# Patient Record
Sex: MALE | Race: WHITE | Employment: UNEMPLOYED | URBAN - METROPOLITAN AREA
[De-identification: names, ages, dates, MRNs, and addresses within clinical notes are randomized per-mention and may not be internally consistent; named-entity substitution may affect disease eponyms.]

---

## 2021-02-01 ENCOUNTER — APPOINTMENT (OUTPATIENT)
Dept: CT IMAGING | Age: 76
DRG: 500 | End: 2021-02-01
Payer: MEDICARE

## 2021-02-01 ENCOUNTER — APPOINTMENT (OUTPATIENT)
Dept: GENERAL RADIOLOGY | Age: 76
DRG: 500 | End: 2021-02-01
Payer: MEDICARE

## 2021-02-01 ENCOUNTER — HOSPITAL ENCOUNTER (INPATIENT)
Age: 76
LOS: 11 days | Discharge: NURSING, REHAB OR LONG-TERM CARE | DRG: 500 | End: 2021-02-12
Attending: EMERGENCY MEDICINE | Admitting: STUDENT IN AN ORGANIZED HEALTH CARE EDUCATION/TRAINING PROGRAM
Payer: MEDICARE

## 2021-02-01 DIAGNOSIS — M62.82 NON-TRAUMATIC RHABDOMYOLYSIS: Primary | ICD-10-CM

## 2021-02-01 DIAGNOSIS — R29.6 FALL IN ELDERLY PATIENT: ICD-10-CM

## 2021-02-01 DIAGNOSIS — D72.829 LEUKOCYTOSIS, UNSPECIFIED TYPE: ICD-10-CM

## 2021-02-01 LAB
ALBUMIN SERPL-MCNC: 3.3 GM/DL (ref 3.4–5)
ALCOHOL SCREEN SERUM: <0.01 %WT/VOL
ALP BLD-CCNC: 78 IU/L (ref 40–128)
ALT SERPL-CCNC: 40 U/L (ref 10–40)
AMMONIA: 18 UMOL/L (ref 16–60)
AMPHETAMINES: NEGATIVE
ANION GAP SERPL CALCULATED.3IONS-SCNC: 19 MMOL/L (ref 4–16)
AST SERPL-CCNC: 97 IU/L (ref 15–37)
BACTERIA: NEGATIVE /HPF
BARBITURATE SCREEN URINE: NEGATIVE
BASOPHILS ABSOLUTE: 0.1 K/CU MM
BASOPHILS RELATIVE PERCENT: 0.3 % (ref 0–1)
BENZODIAZEPINE SCREEN, URINE: NEGATIVE
BILIRUB SERPL-MCNC: 1.5 MG/DL (ref 0–1)
BILIRUBIN URINE: NEGATIVE MG/DL
BLOOD, URINE: ABNORMAL
BUN BLDV-MCNC: 38 MG/DL (ref 6–23)
CALCIUM SERPL-MCNC: 9.1 MG/DL (ref 8.3–10.6)
CANNABINOID SCREEN URINE: NEGATIVE
CHLORIDE BLD-SCNC: 96 MMOL/L (ref 99–110)
CHP ED QC CHECK: NORMAL
CLARITY: ABNORMAL
CO2: 20 MMOL/L (ref 21–32)
COCAINE METABOLITE: NEGATIVE
COLOR: ABNORMAL
CREAT SERPL-MCNC: 0.9 MG/DL (ref 0.9–1.3)
DIFFERENTIAL TYPE: ABNORMAL
EKG ATRIAL RATE: 122 BPM
EKG DIAGNOSIS: NORMAL
EKG P AXIS: 82 DEGREES
EKG P-R INTERVAL: 172 MS
EKG Q-T INTERVAL: 320 MS
EKG QRS DURATION: 64 MS
EKG QTC CALCULATION (BAZETT): 456 MS
EKG R AXIS: 19 DEGREES
EKG T AXIS: 90 DEGREES
EKG VENTRICULAR RATE: 122 BPM
EOSINOPHILS ABSOLUTE: 0 K/CU MM
EOSINOPHILS RELATIVE PERCENT: 0 % (ref 0–3)
GFR AFRICAN AMERICAN: >60 ML/MIN/1.73M2
GFR NON-AFRICAN AMERICAN: >60 ML/MIN/1.73M2
GLUCOSE BLD-MCNC: 109 MG/DL
GLUCOSE BLD-MCNC: 109 MG/DL (ref 70–99)
GLUCOSE BLD-MCNC: 109 MG/DL (ref 70–99)
GLUCOSE BLD-MCNC: 110 MG/DL (ref 70–99)
GLUCOSE, URINE: NEGATIVE MG/DL
GRANULAR CASTS: 10 /LPF
HCT VFR BLD CALC: 49.2 % (ref 42–52)
HEMOGLOBIN: 17.2 GM/DL (ref 13.5–18)
HYALINE CASTS: 10 /LPF
IMMATURE NEUTROPHIL %: 0.9 % (ref 0–0.43)
INR BLD: 1.33 INDEX
KETONES, URINE: ABNORMAL MG/DL
LACTATE: 1.7 MMOL/L (ref 0.4–2)
LEUKOCYTE ESTERASE, URINE: NEGATIVE
LYMPHOCYTES ABSOLUTE: 0.6 K/CU MM
LYMPHOCYTES RELATIVE PERCENT: 2.6 % (ref 24–44)
MAGNESIUM: 2.2 MG/DL (ref 1.8–2.4)
MCH RBC QN AUTO: 35.2 PG (ref 27–31)
MCHC RBC AUTO-ENTMCNC: 35 % (ref 32–36)
MCV RBC AUTO: 100.6 FL (ref 78–100)
MONOCYTES ABSOLUTE: 1.6 K/CU MM
MONOCYTES RELATIVE PERCENT: 6.8 % (ref 0–4)
MUCUS: ABNORMAL HPF
NITRITE URINE, QUANTITATIVE: NEGATIVE
NUCLEATED RBC %: 0 %
OPIATES, URINE: NEGATIVE
OXYCODONE: NEGATIVE
PDW BLD-RTO: 11.9 % (ref 11.7–14.9)
PH, URINE: 5 (ref 5–8)
PHENCYCLIDINE, URINE: NEGATIVE
PLATELET # BLD: 220 K/CU MM (ref 140–440)
PMV BLD AUTO: 10.4 FL (ref 7.5–11.1)
POTASSIUM SERPL-SCNC: 4.2 MMOL/L (ref 3.5–5.1)
PROTEIN UA: 100 MG/DL
PROTHROMBIN TIME: 16.1 SECONDS (ref 11.7–14.5)
RBC # BLD: 4.89 M/CU MM (ref 4.6–6.2)
RBC URINE: 6 /HPF (ref 0–3)
SARS-COV-2, NAAT: NOT DETECTED
SEGMENTED NEUTROPHILS ABSOLUTE COUNT: 20.4 K/CU MM
SEGMENTED NEUTROPHILS RELATIVE PERCENT: 89.4 % (ref 36–66)
SODIUM BLD-SCNC: 135 MMOL/L (ref 135–145)
SOURCE: NORMAL
SPECIFIC GRAVITY UA: 1.03 (ref 1–1.03)
TOTAL CK: 2315 IU/L (ref 38–174)
TOTAL IMMATURE NEUTOROPHIL: 0.21 K/CU MM
TOTAL NUCLEATED RBC: 0 K/CU MM
TOTAL PROTEIN: 6.1 GM/DL (ref 6.4–8.2)
TRICHOMONAS: ABNORMAL /HPF
TROPONIN T: <0.01 NG/ML
UROBILINOGEN, URINE: 1 MG/DL (ref 0.2–1)
WBC # BLD: 22.9 K/CU MM (ref 4–10.5)
WBC UA: 4 /HPF (ref 0–2)
YEAST: ABNORMAL /HPF

## 2021-02-01 PROCEDURE — 73110 X-RAY EXAM OF WRIST: CPT

## 2021-02-01 PROCEDURE — 74177 CT ABD & PELVIS W/CONTRAST: CPT

## 2021-02-01 PROCEDURE — 1200000000 HC SEMI PRIVATE

## 2021-02-01 PROCEDURE — 93010 ELECTROCARDIOGRAM REPORT: CPT | Performed by: INTERNAL MEDICINE

## 2021-02-01 PROCEDURE — 72170 X-RAY EXAM OF PELVIS: CPT

## 2021-02-01 PROCEDURE — 83735 ASSAY OF MAGNESIUM: CPT

## 2021-02-01 PROCEDURE — 71275 CT ANGIOGRAPHY CHEST: CPT

## 2021-02-01 PROCEDURE — 99285 EMERGENCY DEPT VISIT HI MDM: CPT

## 2021-02-01 PROCEDURE — 85610 PROTHROMBIN TIME: CPT

## 2021-02-01 PROCEDURE — 96365 THER/PROPH/DIAG IV INF INIT: CPT

## 2021-02-01 PROCEDURE — 71045 X-RAY EXAM CHEST 1 VIEW: CPT

## 2021-02-01 PROCEDURE — 73130 X-RAY EXAM OF HAND: CPT

## 2021-02-01 PROCEDURE — 2580000003 HC RX 258: Performed by: EMERGENCY MEDICINE

## 2021-02-01 PROCEDURE — 87077 CULTURE AEROBIC IDENTIFY: CPT

## 2021-02-01 PROCEDURE — 2500000003 HC RX 250 WO HCPCS: Performed by: PHYSICIAN ASSISTANT

## 2021-02-01 PROCEDURE — 84484 ASSAY OF TROPONIN QUANT: CPT

## 2021-02-01 PROCEDURE — 82962 GLUCOSE BLOOD TEST: CPT

## 2021-02-01 PROCEDURE — 82140 ASSAY OF AMMONIA: CPT

## 2021-02-01 PROCEDURE — 93005 ELECTROCARDIOGRAM TRACING: CPT | Performed by: EMERGENCY MEDICINE

## 2021-02-01 PROCEDURE — 96361 HYDRATE IV INFUSION ADD-ON: CPT

## 2021-02-01 PROCEDURE — 94761 N-INVAS EAR/PLS OXIMETRY MLT: CPT

## 2021-02-01 PROCEDURE — 80053 COMPREHEN METABOLIC PANEL: CPT

## 2021-02-01 PROCEDURE — 85025 COMPLETE CBC W/AUTO DIFF WBC: CPT

## 2021-02-01 PROCEDURE — 80307 DRUG TEST PRSMV CHEM ANLYZR: CPT

## 2021-02-01 PROCEDURE — 81001 URINALYSIS AUTO W/SCOPE: CPT

## 2021-02-01 PROCEDURE — U0002 COVID-19 LAB TEST NON-CDC: HCPCS

## 2021-02-01 PROCEDURE — 2580000003 HC RX 258: Performed by: PHYSICIAN ASSISTANT

## 2021-02-01 PROCEDURE — 70450 CT HEAD/BRAIN W/O DYE: CPT

## 2021-02-01 PROCEDURE — 87040 BLOOD CULTURE FOR BACTERIA: CPT

## 2021-02-01 PROCEDURE — 82550 ASSAY OF CK (CPK): CPT

## 2021-02-01 PROCEDURE — 36415 COLL VENOUS BLD VENIPUNCTURE: CPT

## 2021-02-01 PROCEDURE — 83605 ASSAY OF LACTIC ACID: CPT

## 2021-02-01 PROCEDURE — 73200 CT UPPER EXTREMITY W/O DYE: CPT

## 2021-02-01 PROCEDURE — G0480 DRUG TEST DEF 1-7 CLASSES: HCPCS

## 2021-02-01 PROCEDURE — 72125 CT NECK SPINE W/O DYE: CPT

## 2021-02-01 PROCEDURE — 6360000004 HC RX CONTRAST MEDICATION: Performed by: EMERGENCY MEDICINE

## 2021-02-01 PROCEDURE — 2700000000 HC OXYGEN THERAPY PER DAY

## 2021-02-01 PROCEDURE — 6360000002 HC RX W HCPCS: Performed by: PHYSICIAN ASSISTANT

## 2021-02-01 PROCEDURE — 93005 ELECTROCARDIOGRAM TRACING: CPT | Performed by: PHYSICIAN ASSISTANT

## 2021-02-01 RX ORDER — SODIUM CHLORIDE 0.9 % (FLUSH) 0.9 %
10 SYRINGE (ML) INJECTION PRN
Status: DISCONTINUED | OUTPATIENT
Start: 2021-02-01 | End: 2021-02-01

## 2021-02-01 RX ORDER — SODIUM CHLORIDE 0.9 % (FLUSH) 0.9 %
10 SYRINGE (ML) INJECTION PRN
Status: DISCONTINUED | OUTPATIENT
Start: 2021-02-01 | End: 2021-02-12 | Stop reason: HOSPADM

## 2021-02-01 RX ORDER — ACETAMINOPHEN 650 MG/1
650 SUPPOSITORY RECTAL EVERY 6 HOURS PRN
Status: DISCONTINUED | OUTPATIENT
Start: 2021-02-01 | End: 2021-02-12 | Stop reason: HOSPADM

## 2021-02-01 RX ORDER — MULTIVITAMIN WITH IRON
1 TABLET ORAL DAILY
Status: DISCONTINUED | OUTPATIENT
Start: 2021-02-02 | End: 2021-02-05

## 2021-02-01 RX ORDER — THIAMINE HYDROCHLORIDE 100 MG/ML
100 INJECTION, SOLUTION INTRAMUSCULAR; INTRAVENOUS DAILY
Status: DISCONTINUED | OUTPATIENT
Start: 2021-02-02 | End: 2021-02-01 | Stop reason: CLARIF

## 2021-02-01 RX ORDER — SODIUM CHLORIDE 0.9 % (FLUSH) 0.9 %
10 SYRINGE (ML) INJECTION EVERY 12 HOURS SCHEDULED
Status: DISCONTINUED | OUTPATIENT
Start: 2021-02-01 | End: 2021-02-01

## 2021-02-01 RX ORDER — 0.9 % SODIUM CHLORIDE 0.9 %
1000 INTRAVENOUS SOLUTION INTRAVENOUS ONCE
Status: COMPLETED | OUTPATIENT
Start: 2021-02-01 | End: 2021-02-01

## 2021-02-01 RX ORDER — PROMETHAZINE HYDROCHLORIDE 25 MG/1
12.5 TABLET ORAL EVERY 6 HOURS PRN
Status: DISCONTINUED | OUTPATIENT
Start: 2021-02-01 | End: 2021-02-12 | Stop reason: HOSPADM

## 2021-02-01 RX ORDER — POLYETHYLENE GLYCOL 3350 17 G/17G
17 POWDER, FOR SOLUTION ORAL DAILY PRN
Status: DISCONTINUED | OUTPATIENT
Start: 2021-02-01 | End: 2021-02-12 | Stop reason: HOSPADM

## 2021-02-01 RX ORDER — ONDANSETRON 2 MG/ML
4 INJECTION INTRAMUSCULAR; INTRAVENOUS EVERY 6 HOURS PRN
Status: DISCONTINUED | OUTPATIENT
Start: 2021-02-01 | End: 2021-02-12 | Stop reason: HOSPADM

## 2021-02-01 RX ORDER — FOLIC ACID 5 MG/ML
1 INJECTION, SOLUTION INTRAMUSCULAR; INTRAVENOUS; SUBCUTANEOUS DAILY
Status: DISCONTINUED | OUTPATIENT
Start: 2021-02-01 | End: 2021-02-01 | Stop reason: SDUPTHER

## 2021-02-01 RX ORDER — SODIUM CHLORIDE 9 MG/ML
INJECTION, SOLUTION INTRAVENOUS CONTINUOUS
Status: DISCONTINUED | OUTPATIENT
Start: 2021-02-01 | End: 2021-02-02

## 2021-02-01 RX ORDER — SODIUM CHLORIDE 0.9 % (FLUSH) 0.9 %
10 SYRINGE (ML) INJECTION EVERY 12 HOURS SCHEDULED
Status: DISCONTINUED | OUTPATIENT
Start: 2021-02-01 | End: 2021-02-12 | Stop reason: HOSPADM

## 2021-02-01 RX ORDER — THIAMINE HYDROCHLORIDE 100 MG/ML
100 INJECTION, SOLUTION INTRAMUSCULAR; INTRAVENOUS ONCE
Status: DISCONTINUED | OUTPATIENT
Start: 2021-02-01 | End: 2021-02-01 | Stop reason: SDUPTHER

## 2021-02-01 RX ORDER — ACETAMINOPHEN 325 MG/1
650 TABLET ORAL EVERY 6 HOURS PRN
Status: DISCONTINUED | OUTPATIENT
Start: 2021-02-01 | End: 2021-02-12 | Stop reason: HOSPADM

## 2021-02-01 RX ADMIN — IOPAMIDOL 75 ML: 755 INJECTION, SOLUTION INTRAVENOUS at 19:07

## 2021-02-01 RX ADMIN — SODIUM CHLORIDE: 9 INJECTION, SOLUTION INTRAVENOUS at 17:41

## 2021-02-01 RX ADMIN — SODIUM CHLORIDE 1000 ML: 9 INJECTION, SOLUTION INTRAVENOUS at 17:40

## 2021-02-01 RX ADMIN — FOLIC ACID: 5 INJECTION, SOLUTION INTRAMUSCULAR; INTRAVENOUS; SUBCUTANEOUS at 19:11

## 2021-02-01 NOTE — ED PROVIDER NOTES
I independently examined and evaluated Darshan Barksdale. In brief their history revealed called for well check, found him lying on the floor, unclear how long he had been down. Was altered, do not know his baseline. Reported that he was last seen about 2 days ago. Their focused exam revealed awake, alert, well-hydrated, well-nourished, and in no acute distress. Speech is clear. Breathing is unlabored. Skin is dry. Mental status is altered. The patient has moves all extremities, and is without facial droop. Oral mucosa appears dry. This EKG was interpreted by me. Rate is 122, rhythm is sinus. SC and QT intervals are within normal limits. There is no ST segment or T wave changes. Artifact present. Nonspecific ST-T wave abnormalities. No previous EKG currently available for comparison. This EKG was interpreted by me. Rate is 127, rhythm is sinus, with frequent PACs. . SC and QT intervals are within normal limits. There is no ST segment or T wave changes. This EKG was compared to previous EKG from same date. ED course: 80-year-old male who presented after being found down. Patient is a poor historian, does not provide much history. Work-up was initiated. Patient was tachycardic in the 120s, IV fluid resuscitation given as he does appear dry. CK was elevated, suggesting rhabdomyolysis. White blood cell count was elevated, no infectious etiology identified at this time, may be secondary to stress reaction given prolonged down time, chest and abdominal CTs were without acute pathology. UA is still pending at time of admission. It was reported patient has history of alcohol abuse thus CIWA was recommended for persistent tachycardia may be secondary to this. We will plan for admission to the hospital for further evaluation and care. All diagnostic, treatment, and disposition decisions were made by myself in conjunction with the Advanced Practice Provider.     For all further details of the patient's emergency department visit, please see the Advanced Practice Provider's documentation.       Sandro Norton DO  02/05/21 6936

## 2021-02-01 NOTE — ED TRIAGE NOTES
Pt presents to ED by EMS from home for unknown fall and altered mental status. Pt was found at home by welfare check per PD.  Pt arrived with altered mental status, A&O x2. Pt has deformity to left wrist. Alexsandra MEJIA is at bedside

## 2021-02-01 NOTE — ED PROVIDER NOTES
EMERGENCY DEPARTMENT ENCOUNTER      PCP: No primary care provider on file. CHIEF COMPLAINT    Chief Complaint   Patient presents with    Altered Mental Status       Of note, this patient was also evaluated by the attending physician, Dr. German Wilkinson. HPI    Joan Mccall is a 77 y.o. male who presents via EMS for being found lying on floor at home. Exact onset unknown. Apparently patient's friends reported to EMS that he was not answering his phone or door for the past 2 days and thus EMS was notified. Per EMS, patient was found lying on the floor. He does provide history of fall or reason why he is on the floor but does state that he was eating at [employer], biscuits and gravy prior to EMS arrival.    Patient denies any pain. Denies fevers. Denies any symptoms. REVIEW OF SYSTEMS    Patient appears somewhat confused on arrival, initial exam.  Constitutional:  Denies fever, chills, weight loss or weakness   HENT:  Denies sore throat or ear pain   Cardiovascular:  Denies chest pain, palpitations   Respiratory:  Denies cough or shortness of breath    GI:  Denies abdominal pain, nausea, vomiting, or diarrhea  :  Denies any urinary symptoms   Musculoskeletal:  Denies back pain  Skin:  Denies rash  Neurologic: Has pain to superior aspect of posterior scalp, otherwise denies headache, focal weakness or sensory changes   Endocrine:  Denies polyuria or polydypsia   Lymphatic:  Denies swollen glands     All other review of systems are negative  See HPI and nursing notes for additional information     PAST MEDICAL AND SURGICAL HISTORY    No past medical history on file. No past surgical history on file.     CURRENT MEDICATIONS        ALLERGIES    No Known Allergies    SOCIAL AND FAMILY HISTORY    Social History     Socioeconomic History    Marital status: Single     Spouse name: Not on file    Number of children: Not on file    Years of education: Not on file    Highest education level: Not on file   Occupational History    Not on file   Social Needs    Financial resource strain: Not on file    Food insecurity     Worry: Not on file     Inability: Not on file    Transportation needs     Medical: Not on file     Non-medical: Not on file   Tobacco Use    Smoking status: Not on file   Substance and Sexual Activity    Alcohol use: Not on file    Drug use: Not on file    Sexual activity: Not on file   Lifestyle    Physical activity     Days per week: Not on file     Minutes per session: Not on file    Stress: Not on file   Relationships    Social connections     Talks on phone: Not on file     Gets together: Not on file     Attends Jainism service: Not on file     Active member of club or organization: Not on file     Attends meetings of clubs or organizations: Not on file     Relationship status: Not on file    Intimate partner violence     Fear of current or ex partner: Not on file     Emotionally abused: Not on file     Physically abused: Not on file     Forced sexual activity: Not on file   Other Topics Concern    Not on file   Social History Narrative    Not on file     No family history on file. PHYSICAL EXAM    VITAL SIGNS: /66   Pulse 121   Temp 97.6 °F (36.4 °C) (Oral)   Resp 20   SpO2 97%    Constitutional:  Well developed, Well nourished. No distress  HENT:  Normocephalic, Atraumatic, PERRL. EOMI. Sclera clear. Conjunctiva normal, No discharge. Patient is without teeth. No oral cavity or oropharynx trauma. Oral mucosa dry. Neck/Lymphatics: supple, no JVD, no swollen nodes  Cardiovascular:  Rate 120, rhythm regular no murmurs/rubs/gallops. No JVD    Respiratory: Slightly labored breathing with rate of 20. No adventitious lung sounds heard. Abdomen: Bowel sounds normal, Soft, No tenderness, no masses. Musculoskeletal:    Left wrist with swelling diffusely. Skin intact. There is mild to moderate tenderness diffusely.   Difficult to assess for snuffbox tenderness given guarding. Range of motion moderately limited due to pain. Able to move all fingers, hand exam without obvious abnormality. Distal sensation, capillary refill, motor intact. There is no edema, asymmetry, or calf / thigh tenderness bilaterally. No cyanosis. No cool or pale-appearing limb. Distal cap refill and pulses intact bilateral upper and lower extremities  Patient has limited range of motion of bilateral shoulders to 80 degrees forward flexion and abduction. Patient states his shoulders are \"always this way\". Integument:  Warm, Dry    Neurologic:    - Alert & oriented person, place, time, and situation, patient is adentulous and difficult to understand, however no definitive slurred speech   - No obvious gross motor deficits  - Cranial nerves 2-12 grossly intact  - Negative meningeal signs.  - Sensation intact to light touch  -Difficult to assess cerebellar signs as patient has swelling and pain to left wrist region. He also only lifts bilateral shoulders to 80 degrees forward flexion states-baseline per patient.  - No pronator drift. - Light touch sensation intact throughout.   - Upper and lower extremity DTRs 2+ bilaterally.  -No truncal ataxia          Psychiatric:  Affect normal, Mood normal.       Labs:  Results for orders placed or performed during the hospital encounter of 02/01/21   CBC Auto Differential   Result Value Ref Range    WBC 22.9 (H) 4.0 - 10.5 K/CU MM    RBC 4.89 4.6 - 6.2 M/CU MM    Hemoglobin 17.2 13.5 - 18.0 GM/DL    Hematocrit 49.2 42 - 52 %    .6 (H) 78 - 100 FL    MCH 35.2 (H) 27 - 31 PG    MCHC 35.0 32.0 - 36.0 %    RDW 11.9 11.7 - 14.9 %    Platelets 470 151 - 004 K/CU MM    MPV 10.4 7.5 - 11.1 FL    Differential Type AUTOMATED DIFFERENTIAL     Segs Relative 89.4 (H) 36 - 66 %    Lymphocytes % 2.6 (L) 24 - 44 %    Monocytes % 6.8 (H) 0 - 4 %    Eosinophils % 0.0 0 - 3 %    Basophils % 0.3 0 - 1 %    Segs Absolute 20.4 K/CU MM    Lymphocytes Absolute 0.6 K/CU MM    Monocytes Absolute 1.6 K/CU MM    Eosinophils Absolute 0.0 K/CU MM    Basophils Absolute 0.1 K/CU MM    Nucleated RBC % 0.0 %    Total Nucleated RBC 0.0 K/CU MM    Total Immature Neutrophil 0.21 K/CU MM    Immature Neutrophil % 0.9 (H) 0 - 0.43 %   CMP   Result Value Ref Range    Sodium 135 135 - 145 MMOL/L    Potassium 4.2 3.5 - 5.1 MMOL/L    Chloride 96 (L) 99 - 110 mMol/L    CO2 20 (L) 21 - 32 MMOL/L    BUN 38 (H) 6 - 23 MG/DL    CREATININE 0.9 0.9 - 1.3 MG/DL    Glucose 110 (H) 70 - 99 MG/DL    Calcium 9.1 8.3 - 10.6 MG/DL    Albumin 3.3 (L) 3.4 - 5.0 GM/DL    Total Protein 6.1 (L) 6.4 - 8.2 GM/DL    Total Bilirubin 1.5 (H) 0.0 - 1.0 MG/DL    ALT 40 10 - 40 U/L    AST 97 (H) 15 - 37 IU/L    Alkaline Phosphatase 78 40 - 128 IU/L    GFR Non-African American >60 >60 mL/min/1.73m2    GFR African American >60 >60 mL/min/1.73m2    Anion Gap 19 (H) 4 - 16   Urinalysis   Result Value Ref Range    Color, UA CHIQUITA (A) YELLOW    Clarity, UA SLIGHTLY CLOUDY (A) CLEAR    Glucose, Urine NEGATIVE NEGATIVE MG/DL    Bilirubin Urine NEGATIVE NEGATIVE MG/DL    Ketones, Urine MODERATE (A) NEGATIVE MG/DL    Specific Gravity, UA 1.027 1.001 - 1.035    Blood, Urine LARGE (A) NEGATIVE    pH, Urine 5.0 5.0 - 8.0    Protein,  (A) NEGATIVE MG/DL    Urobilinogen, Urine 1 0.2 - 1.0 MG/DL    Nitrite Urine, Quantitative NEGATIVE NEGATIVE    Leukocyte Esterase, Urine NEGATIVE NEGATIVE    RBC, UA 6 (H) 0 - 3 /HPF    WBC, UA 4 (H) 0 - 2 /HPF    Bacteria, UA NEGATIVE NEGATIVE /HPF    Yeast, UA RARE /HPF    Mucus, UA FEW (A) NEGATIVE HPF    Trichomonas, UA NONE SEEN NONE SEEN /HPF    Hyaline Casts, UA 10 /LPF    Granular Casts, UA 10 /LPF   CK   Result Value Ref Range    Total CK 2,315 (H) 38 - 174 IU/L   Magnesium   Result Value Ref Range    Magnesium 2.2 1.8 - 2.4 mg/dl   Troponin   Result Value Ref Range    Troponin T <0.010 <0.01 NG/ML   PT/INR   Result Value Ref Range    Protime 16.1 (H) 11.7 - 14.5 SECONDS INR 1.33 INDEX   Urine Drug Screen   Result Value Ref Range    Cannabinoid Scrn, Ur NEGATIVE NEGATIVE    Amphetamines NEGATIVE NEGATIVE    Cocaine Metabolite NEGATIVE NEGATIVE    Benzodiazepine Screen, Urine NEGATIVE NEGATIVE    Barbiturate Screen, Ur NEGATIVE NEGATIVE    Opiates, Urine NEGATIVE NEGATIVE    Phencyclidine, Urine NEGATIVE NEGATIVE    Oxycodone NEGATIVE NEGATIVE   Ammonia   Result Value Ref Range    Ammonia 18 16 - 60 UMOL/L   Lactic Acid, Plasma   Result Value Ref Range    Lactate 1.7 0.4 - 2.0 mMOL/L   COVID-19    Specimen: Nasopharynx/Oropharynx   Result Value Ref Range    Source THROAT     SARS-CoV-2, NAAT NOT DETECTED    POCT Glucose   Result Value Ref Range    POC Glucose 109 (H) 70 - 99 MG/DL   EKG 12 Lead   Result Value Ref Range    Ventricular Rate 122 BPM    Atrial Rate 122 BPM    P-R Interval 172 ms    QRS Duration 64 ms    Q-T Interval 320 ms    QTc Calculation (Bazett) 456 ms    P Axis 82 degrees    R Axis 19 degrees    T Axis 90 degrees    Diagnosis       Sinus tachycardia with premature supraventricular complexes  Septal infarct , age undetermined  Abnormal ECG  No previous ECGs available  Confirmed by ELSY Webber ([id number]) on 2/1/2021 6:57:51 PM             EKG Interpretation  Please see ED physician's note for EKG interpretation       RADIOLOGY    CT ABDOMEN PELVIS W IV CONTRAST   Final Result   1. No acute intra-abdominal process identified. 2. Moderate amount of stool at the rectal vault. 3. Severe atherosclerotic disease. 4. Cholelithiasis. 5. 1.1 cm indeterminate right adrenal nodule. Given size and Hounsfield   units, findings reflect probable adenoma. Current guidelines recommend 1   year follow-up adrenal washout CT. If stable greater than or equal to 1   year, no further follow-up imaging. CTA PULMONARY W CONTRAST   Final Result   No evidence of pulmonary embolism or acute pulmonary abnormality.       Cholelithiasis without CT evidence of acute cholecystitis. CT WRIST LEFT WO CONTRAST   Final Result   1. Malalignment of the wrist which has the appearance of scapholunate   advanced collapse and is favored to be chronic given the overall appearance. No remote comparison exams are available. Associated fragmentation of the   lunate which is also favored to be chronic. No definite acute fracture   identified. 2. 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 3. Osteopenia. 4. Diffuse soft tissue edema. XR HAND LEFT (MIN 3 VIEWS)   Final Result   Triquetral fracture, of uncertain age. Multiple abnormalities in the carpus. CT scan is recommended for further   evaluation. Diffuse soft tissue swelling around the left wrist.         XR WRIST LEFT (MIN 3 VIEWS)   Final Result   Triquetral fracture, of uncertain age. Multiple abnormalities in the carpus. CT scan is recommended for further   evaluation. Diffuse soft tissue swelling around the left wrist.         XR PELVIS (1-2 VIEWS)   Final Result   No definite acute fracture or dislocation in the pelvis. XR CHEST PORTABLE   Final Result   No acute abnormality identified. CT CERVICAL SPINE WO CONTRAST   Final Result   No acute abnormality of the cervical spine. CT HEAD WO CONTRAST   Final Result   1. High left posterior scalp soft tissue swelling but no acute intracranial   abnormality. 2. Diffuse cerebral atrophy with chronic small vessel ischemic disease. ED COURSE & MEDICAL DECISION MAKING       Patient presents as above via EMS. CRITICAL CARE NOTE:  There was a high probability of clinically significant life-threatening deterioration of the patient's condition requiring my urgent intervention due to tachycardia, confusion, fall and lying on floor of unknown duration. Immediate bedside evaluation was done.   Patient clinically dehydrated, slightly confused without obvious focal neuro deficit, however exam somewhat limited due to patient's left upper extremity injury. Immediately sent patient to CT scanner, initiated laboratory evaluation as well as IV fluids. Emergent EKG obtained revealing sinus tachycardia. Patient closely monitored for response to IV fluids. Tachycardia only marginally response. Per EMS report, patient is known in community for EtOH is him. Given this, [facility] protocol orders were initiated. Fluids continued. Close monitoring continued. Total critical care time is at least  35 minutes. This includes vital sign monitoring, pulse oximetry monitoring, telemetry monitoring, clinical response to the IV medications, reviewing the nursing notes, consultation time, dictation/documentation time, and interpretation of the lab work. This time excludes family discussion time and time spent performing separately billable procedure(s) (see below for more details)      If applicable, Any/all of the below separately billable procedures that were performed during this critical care intervention are documented separately - please see the appropriate note for details:        -ECG interpretation       2015-I discussed patient case with hospitalist, Dr. Yolis Souza. He agrees to admit patient. Clinical  IMPRESSION    1. Non-traumatic rhabdomyolysis    2. Leukocytosis, unspecified type    3. Fall in elderly patient          Pt admitted. Comment: Please note this report has been produced using speech recognition software and may contain errors related to that system including errors in grammar, punctuation, and spelling, as well as words and phrases that may be inappropriate. If there are any questions or concerns please feel free to contact the dictating provider for clarification.          Mari Britton [place]  02/01/21 2016

## 2021-02-01 NOTE — CARE COORDINATION
CM consult per Dr Frances Welch to initiate discharge planning. Review of pt chart, one other visit to [address] [place] [address]. No emergency contacts and no insurance. This CM visited pt, who is awake, Harjinder Zavala RN also in room with pt. I introduced self and reason for visit. CM ask pt what was his reason for coming to the hospital. Pt didn't know states he was asleep, and was awakened. Pt states that yesterday he was at the [facility] having biscuits and gravy and a few beers. This CM ask pt questions to find out information on this pt. Pt stated he drives, a red [place]. Pt lives alone in a house, has a dtr/Kierra that  Lives in [place] [address].. Pt has a cell phone, ask if he has his dtrs # in his phone he states yes, gave this CM permission to look, found a [employer] [phone] called the # left a message. Pt tells me he has [employer] states the card is in his pants or coat pocket in a rubber band. Pt gave me permission to look through his pants. No insurance card pt had keys three diff key rings, [employer] card and one other entry type of card, $50 dollar bill and some change. Pt states he uses a walker to get to his car, go's through the drive through. Pt wants to return home at discharge from hospital, pt has no needs. Registration was able to find pt insurance and add it to pt chart. Spoke with Linda Zavaleta who states he will have to admit this pt today due to Rhabdomyolysis.  STANISLAWRN/CM

## 2021-02-02 ENCOUNTER — APPOINTMENT (OUTPATIENT)
Dept: GENERAL RADIOLOGY | Age: 76
DRG: 500 | End: 2021-02-02
Payer: MEDICARE

## 2021-02-02 LAB
ALBUMIN SERPL-MCNC: 2.7 GM/DL (ref 3.4–5)
ALP BLD-CCNC: 70 IU/L (ref 40–128)
ALT SERPL-CCNC: 32 U/L (ref 10–40)
ANION GAP SERPL CALCULATED.3IONS-SCNC: 13 MMOL/L (ref 4–16)
AST SERPL-CCNC: 49 IU/L (ref 15–37)
BILIRUB SERPL-MCNC: 1 MG/DL (ref 0–1)
BUN BLDV-MCNC: 29 MG/DL (ref 6–23)
CALCIUM SERPL-MCNC: 8.2 MG/DL (ref 8.3–10.6)
CHLORIDE BLD-SCNC: 104 MMOL/L (ref 99–110)
CO2: 21 MMOL/L (ref 21–32)
CREAT SERPL-MCNC: 0.7 MG/DL (ref 0.9–1.3)
EKG ATRIAL RATE: 127 BPM
EKG DIAGNOSIS: NORMAL
EKG P AXIS: 96 DEGREES
EKG P-R INTERVAL: 198 MS
EKG Q-T INTERVAL: 340 MS
EKG QRS DURATION: 88 MS
EKG QTC CALCULATION (BAZETT): 494 MS
EKG R AXIS: -31 DEGREES
EKG T AXIS: 63 DEGREES
EKG VENTRICULAR RATE: 127 BPM
FOLATE: 5 NG/ML (ref 3.1–17.5)
GFR AFRICAN AMERICAN: >60 ML/MIN/1.73M2
GFR NON-AFRICAN AMERICAN: >60 ML/MIN/1.73M2
GLUCOSE BLD-MCNC: 94 MG/DL (ref 70–99)
HCT VFR BLD CALC: 44.7 % (ref 42–52)
HEMOGLOBIN: 14.9 GM/DL (ref 13.5–18)
MCH RBC QN AUTO: 35.1 PG (ref 27–31)
MCHC RBC AUTO-ENTMCNC: 33.3 % (ref 32–36)
MCV RBC AUTO: 105.4 FL (ref 78–100)
PDW BLD-RTO: 12.4 % (ref 11.7–14.9)
PHOSPHORUS: 2.5 MG/DL (ref 2.5–4.9)
PLATELET # BLD: 194 K/CU MM (ref 140–440)
PMV BLD AUTO: 10.9 FL (ref 7.5–11.1)
POTASSIUM SERPL-SCNC: 3.8 MMOL/L (ref 3.5–5.1)
RBC # BLD: 4.24 M/CU MM (ref 4.6–6.2)
SODIUM BLD-SCNC: 138 MMOL/L (ref 135–145)
TOTAL CK: 816 IU/L (ref 38–174)
TOTAL PROTEIN: 5.1 GM/DL (ref 6.4–8.2)
TSH HIGH SENSITIVITY: 0.75 UIU/ML (ref 0.27–4.2)
VITAMIN B-12: 513.3 PG/ML (ref 211–911)
WBC # BLD: 20.3 K/CU MM (ref 4–10.5)

## 2021-02-02 PROCEDURE — 2580000003 HC RX 258: Performed by: INTERNAL MEDICINE

## 2021-02-02 PROCEDURE — 97530 THERAPEUTIC ACTIVITIES: CPT

## 2021-02-02 PROCEDURE — 6360000002 HC RX W HCPCS: Performed by: STUDENT IN AN ORGANIZED HEALTH CARE EDUCATION/TRAINING PROGRAM

## 2021-02-02 PROCEDURE — 85027 COMPLETE CBC AUTOMATED: CPT

## 2021-02-02 PROCEDURE — 84425 ASSAY OF VITAMIN B-1: CPT

## 2021-02-02 PROCEDURE — 2580000003 HC RX 258: Performed by: STUDENT IN AN ORGANIZED HEALTH CARE EDUCATION/TRAINING PROGRAM

## 2021-02-02 PROCEDURE — 97162 PT EVAL MOD COMPLEX 30 MIN: CPT

## 2021-02-02 PROCEDURE — 82746 ASSAY OF FOLIC ACID SERUM: CPT

## 2021-02-02 PROCEDURE — 99223 1ST HOSP IP/OBS HIGH 75: CPT | Performed by: NURSE PRACTITIONER

## 2021-02-02 PROCEDURE — 80053 COMPREHEN METABOLIC PANEL: CPT

## 2021-02-02 PROCEDURE — 73630 X-RAY EXAM OF FOOT: CPT

## 2021-02-02 PROCEDURE — 94761 N-INVAS EAR/PLS OXIMETRY MLT: CPT

## 2021-02-02 PROCEDURE — 2700000000 HC OXYGEN THERAPY PER DAY

## 2021-02-02 PROCEDURE — 84443 ASSAY THYROID STIM HORMONE: CPT

## 2021-02-02 PROCEDURE — 93010 ELECTROCARDIOGRAM REPORT: CPT | Performed by: INTERNAL MEDICINE

## 2021-02-02 PROCEDURE — 92610 EVALUATE SWALLOWING FUNCTION: CPT | Performed by: SPEECH-LANGUAGE PATHOLOGIST

## 2021-02-02 PROCEDURE — 97166 OT EVAL MOD COMPLEX 45 MIN: CPT

## 2021-02-02 PROCEDURE — 82550 ASSAY OF CK (CPK): CPT

## 2021-02-02 PROCEDURE — 99211 OFF/OP EST MAY X REQ PHY/QHP: CPT

## 2021-02-02 PROCEDURE — 1200000000 HC SEMI PRIVATE

## 2021-02-02 PROCEDURE — 82607 VITAMIN B-12: CPT

## 2021-02-02 PROCEDURE — 36415 COLL VENOUS BLD VENIPUNCTURE: CPT

## 2021-02-02 PROCEDURE — 84100 ASSAY OF PHOSPHORUS: CPT

## 2021-02-02 RX ORDER — SODIUM CHLORIDE 9 MG/ML
INJECTION, SOLUTION INTRAVENOUS CONTINUOUS
Status: DISCONTINUED | OUTPATIENT
Start: 2021-02-02 | End: 2021-02-03

## 2021-02-02 RX ADMIN — SODIUM CHLORIDE: 9 INJECTION, SOLUTION INTRAVENOUS at 01:57

## 2021-02-02 RX ADMIN — SODIUM CHLORIDE: 9 INJECTION, SOLUTION INTRAVENOUS at 21:33

## 2021-02-02 RX ADMIN — ENOXAPARIN SODIUM 40 MG: 40 INJECTION SUBCUTANEOUS at 08:09

## 2021-02-02 RX ADMIN — THIAMINE HYDROCHLORIDE 100 MG: 100 INJECTION, SOLUTION INTRAMUSCULAR; INTRAVENOUS at 08:58

## 2021-02-02 RX ADMIN — SODIUM CHLORIDE, PRESERVATIVE FREE 10 ML: 5 INJECTION INTRAVENOUS at 08:09

## 2021-02-02 RX ADMIN — SODIUM CHLORIDE, PRESERVATIVE FREE 10 ML: 5 INJECTION INTRAVENOUS at 21:36

## 2021-02-02 RX ADMIN — SODIUM CHLORIDE: 9 INJECTION, SOLUTION INTRAVENOUS at 08:57

## 2021-02-02 ASSESSMENT — ENCOUNTER SYMPTOMS
NAUSEA: 0
SHORTNESS OF BREATH: 0
DIARRHEA: 0
ANAL BLEEDING: 0
VOMITING: 0
BLOOD IN STOOL: 0
RHINORRHEA: 0
ABDOMINAL PAIN: 0
CHEST TIGHTNESS: 0
COUGH: 0
COLOR CHANGE: 0
WHEEZING: 0
SORE THROAT: 0

## 2021-02-02 NOTE — ED NOTES
@1002 paged hospitalists
ANGEL Taylor.  [facility]  02/01/21 1739
Bed: 01TR-01  Expected date:   Expected time:   Means of arrival:   Comments:  EMS- fall     Fela Walsh Aas RN  02/01/21 2789
Pt states it is 2002 and is unaware of day of week. Pt is alert to self and situation, denies pain at this time.  Blood glucose is 115 Moon Wright RN  02/01/21 5454
Report given to Deanne Perez RN.       Pamela Cabrera RN  02/01/21 2122
Report received from Mervin Braswell.       Yuridia Tellez RN  02/01/21 6597
[place], lactic and type screen drawn.      Mauricio Barkley  02/01/21 2293
Plan: Recheck at 6 months
Detail Level: Zone

## 2021-02-02 NOTE — PROGRESS NOTES
Speech Language Pathology  Facility/Department: [address] [place] [address]   CLINICAL BEDSIDE SWALLOW EVALUATION    NAME: Giulia Villegas  : 1945  MRN: [id number]    Impression  Dysphagia Diagnosis: Swallow function appears grossly intact  Dysphagia Outcome Severity Scale: Level 6: Within functional limits/Modified independence     Giulia Villegas was seen for a clinical bedside swallow evaluation following admission for fall at home, rhabdo, dehydration, and confusion. CT head and CXR are negative. H/o ETOH use d/o. Pt was alert, confused and mildly upset regarding his admission and needing to find his cell phone. Pt's dtr called during the assessment and spoke with pt. She reported to ST that pt sounded very confused compared to his baseline. He denied h/o dysphagia and reports difficulty chewing some solids due to no dentition. Pt followed all commands for oral motor exam which was WNL with no focal weakness or asymmetry. Vocal quality and cough strength were WNL. PO trials of thins by cup and straw and pureed/regular solids completed. Oropharyngeal swallow appears WFL with normal bolus formation, a-p transit and mild lingual residue for regular solids and 0 s/s aspiration across all trials including continuous sips of thins by straw. Pt c/o gum pain with mastication of regular solids. Recommend:  Dental Soft/Thins. Assist/Total feed due to LUE injury. No needs identified at this time.       ADMISSION DATE: 2021  ADMITTING DIAGNOSIS: has Rhabdomyolysis on their problem list.  ONSET DATE: 2021     Recent Chest Xray/CT of Chest: negative    Date of Eval: 2021  Evaluating Therapist: Bhavani Araujo    Current Diet level:  Current Diet : NPO  Current Liquid Diet : NPO    Primary Complaint  Patient Complaint: wants cell phone, wants something to drink    Pain:  Pain Assessment  Pain Assessment: 0-10  Pain Level: 4  Patient's Stated Pain Goal: No pain  Pain Type: Acute pain    Reason for Referral  Elton Ahmadi was referred for a bedside swallow evaluation to assess the efficiency of his swallow function, identify signs and symptoms of aspiration and make recommendations regarding safe dietary consistencies, effective compensatory strategies, and safe eating environment. Treatment Plan  Requires SLP Intervention: No  Duration/Frequency of Treatment: n/a  D/C Recommendations: To be determined       Recommended Diet and Intervention  Diet Solids Recommendation: Dental Soft  Liquid Consistency Recommendation: Thin  Recommended Form of Meds: PO  Recommendations: Assist feed; Total feed       Compensatory Swallowing Strategies  Compensatory Swallowing Strategies: Upright as possible for all oral intake    Treatment/Goals  Short-term Goals  Timeframe for Short-term Goals: n/a  Long-term Goals  Timeframe for Long-term Goals: n/a    General  Chart Reviewed: Yes  Behavior/Cognition: Alert; Cooperative  Respiratory Status: Room air  Communication Observation: Functional  Follows Directions: Complex  Dentition: Edentulous  Patient Positioning: Upright in chair  Baseline Vocal Quality: Normal  Volitional Cough: Strong  Volitional Swallow: (intact)  Prior Dysphagia History: denies  Consistencies Administered: Reg solid; Dysphagia Pureed (Dysphagia I); Thin - straw; Thin - cup           Vision/Hearing  Hearing  Hearing: Within functional limits    Oral Motor Deficits  Oral/Motor  Oral Motor:  Within functional limits    Oral Phase Dysfunction  Oral Phase  Oral Phase: Exceptions  Oral Phase Dysfunction  Impaired Mastication: Reg Solid     Indicators of Pharyngeal Phase Dysfunction   Pharyngeal Phase  Pharyngeal Phase: WNL    Prognosis  Prognosis  Prognosis for safe diet advancement: good  Barriers/Prognosis Comment: confusion, no dentition  Individuals consulted  Consulted and agree with results and recommendations: Patient;RN    Education  Patient Education: results [facility]  Patient Education Response: Demonstrated understanding  Safety Devices in place: Yes  Type of devices: Left in chair; All fall risk precautions in place       Therapy Time  SLP Individual Minutes  Time In: 1130  Time Out: 1200  Minutes: Sepideh Gaytan [place]  2/2/2021 12:04 PM

## 2021-02-02 NOTE — CARE COORDINATION
CM in to see Pt to discuss discharge planning. Pt is from home alone. Pt states he has DME to include a cane, walker, and rollator. Pt has insurance, no pcp listed, and states he can afford his medications. PT/OT recommend SNF at discharge. Pt agreeable, first choice [facility] [facility]. CM made referral to Lorene Spurling. Pt denies any other needs at this time.   CM following for discharge needs

## 2021-02-02 NOTE — PROGRESS NOTES
Hospitalist Progress Note      Name:  Carol Jasso /Age/Sex: 1945  (77 y.o. male)   MRN & CSN:  [id number] & [id number] Admission Date/Time: 2021  2:05 PM   Location:  [address] [place] [address] PCP: No primary care provider on file. Hospital Day: 2    Assessment and Plan:   Carol Jasso is a 77 y.o.  male  who presents with Rhabdomyolysis    --Nontraumatic Rhadomyolysis   Due to Prolonged immobilization after a fall. No CISCO. CK decreasing with fluid resuscitation (2315->816). Plan: continue  NS    --Mechanical fall at home - PT/OT    --Left Triquetral fracture, likely from fall on outstretched arm. Plan: get orthopedic surgery consult. --Left foot ulcer (present on admission)  Wound care on board  Left foot Xray    --Alcohol use disorder  Serum alcohol not elevated. No withdrawal symptoms at this time. Plan: folate and thiamine. --Incidental Rt Adrenal nodule - repeat imaging in 1 year. PCP to f/u. --Abnormal LFTs - from rhabdomyloysis. Improving. --Leukocytosis (22K) - likely reactive. No fever or chills. Continue to monitor off antibiotics. --Constipation - CT abd/pel shows stools in rectal vault. Plan: Miralaxa PRN    Diet DIET GENERAL;   DVT Prophylaxis [] Lovenox, []  Heparin, [] SCDs, [] Ambulation   GI Prophylaxis [] PPI,  [] H2 Blocker,  [] Carafate,  [] Diet/Tube Feeds   Code Status Full Code   Disposition Pending PT/OT. MDM [] Low, [x] Moderate,[]  High     History of Present Illness:   Patient seen and examined. No acute issues overnight. Left wrist pain well controlled. CK decreasing. Ten point ROS reviewed negative, unless as noted above    Objective:   No intake or output data in the 24 hours ending 21 1129   Vitals:   Vitals:    21 0800   BP: 112/76   Pulse: 103   Resp: 14   Temp: 98.3 °F (36.8 °C)   SpO2: 95%     Physical Exam:   GEN Awake male, sitting upright in bed. RESP Breathing comfortably on RA. CARDIO/VASC S1/S2 auscultated. Regular rate without appreciable murmurs. No peripheral edema. GI Abdomen is soft without significant tenderness, masses, or guarding. Bowel sounds are normoactive. MSK Left wrist splinted. SKIN Normal coloration, warm, dry. NEURO  normal speech, no lateralizing weakness. PSYCH Awake, alert, oriented x 3.      Medications:   Medications:    sodium chloride flush  10 mL Intravenous 2 times per day    enoxaparin  40 mg Subcutaneous Daily    multivitamin  1 tablet Oral Daily    thiamine (VITAMIN B1) IVPB  100 mg Intravenous Daily      Infusions:    sodium chloride 100 mL/hr at 02/02/21 0857     PRN Meds:     sodium chloride flush, 10 mL, PRN      promethazine, 12.5 mg, Q6H PRN    Or      ondansetron, 4 mg, Q6H PRN      polyethylene glycol, 17 g, Daily PRN      acetaminophen, 650 mg, Q6H PRN    Or      acetaminophen, 650 mg, Q6H PRN        CBC   Recent Labs     02/01/21  1240 02/02/21  0639   WBC 22.9* 20.3*   HGB 17.2 14.9   HCT 49.2 44.7    194      BMP   Recent Labs     02/01/21  1240 02/02/21  0639    138   K 4.2 3.8   CL 96* 104   CO2 20* 21   PHOS  --  2.5   BUN 38* 29*   CREATININE 0.9 0.7*       Radiology report reviewed     Electronically signed by Artemio Shone, MD on 2/2/2021 at 11:29 AM

## 2021-02-02 NOTE — CONSULTS
Neurology Service Consult Note  [facility]  Patient Name: Rustam Gonzales  : 1945        Subjective:   Reason for consult: \"I just fell at the [facility]"  77 y.o. right-handed male With past medical history of tobacco abuse and alcohol abuse presenting to [facility] after patient: Ruthy Harvey describes a change of events that are peculiar that occurred 1 day prior to exam.  Patient ports that yesterday morning he went to [employer] to go get biscuits and gravy he ate a biscuit and gravy, then he went to the [employer] where he wanted to get more breakfast, he states he frequently angles, he states that he wanted eggs and toast, he also likes to drink a beer before and after breakfast so he was sitting there, he states he then dropped a cigarette on the floor and also dropped to 50 dollar bill and a couple 20s he went to go get them he felt, he states that he wanted to then leave, and he tried to exit the kitchen but then the police were called. He states the police check on him, they have all normal as they let him drive his vehicle and when he got home he felt like 10 and this is when either the police decided to call EMS or neighbor noticed what was going on for [place]. The ER note says that he was down for period of time and this is evidenced by an increased creatinine kinase, however this part of the story is not given by the patient. He states that he that he does not feel very much and he wants to be left alone ideally. We had extensive conversation at the bedside that drinking even 3-4 beers can lower B12 levels which then in turn can cause us to have peripheral neuropathy and balance disturbance and patient does admit that he has poor balance daily. He denies any unilateral arm or leg weakness he had no chest pain or shortness of breath no aphasia facial droop or dysarthria. He had no fever neck or back pain.       Past Medical History:   Diagnosis Date    Alcohol abuse     Fall in elderly patient     Tobacco abuse     : No past surgical history on file. Medications:  Scheduled Meds:   sodium chloride flush  10 mL Intravenous 2 times per day    enoxaparin  40 mg Subcutaneous Daily    multivitamin  1 tablet Oral Daily    thiamine (VITAMIN B1) IVPB  100 mg Intravenous Daily     Continuous Infusions:   sodium chloride 100 mL/hr at 02/02/21 0857     PRN Meds:.sodium chloride flush, promethazine **OR** ondansetron, polyethylene glycol, acetaminophen **OR** acetaminophen    No Known Allergies  Social History     Socioeconomic History    Marital status: Single     Spouse name: Not on file    Number of children: Not on file    Years of education: Not on file    Highest education level: Not on file   Occupational History    Not on file   Social Needs    Financial resource strain: Not on file    Food insecurity     Worry: Not on file     Inability: Not on file    Transportation needs     Medical: Not on file     Non-medical: Not on file   Tobacco Use    Smoking status: Not on file   Substance and Sexual Activity    Alcohol use: Not on file    Drug use: Not on file    Sexual activity: Not on file   Lifestyle    Physical activity     Days per week: Not on file     Minutes per session: Not on file    Stress: Not on file   Relationships    Social connections     Talks on phone: Not on file     Gets together: Not on file     Attends Religion service: Not on file     Active member of club or organization: Not on file     Attends meetings of clubs or organizations: Not on file     Relationship status: Not on file    Intimate partner violence     Fear of current or ex partner: Not on file     Emotionally abused: Not on file     Physically abused: Not on file     Forced sexual activity: Not on file   Other Topics Concern    Not on file   Social History Narrative    Not on file      History reviewed. No pertinent family history. Review of Symptoms:    14-point system review completed.  All of which are negative except as mentioned above. Physical Exam:         Gen: Patient alert and awake to himself, he knew the previous president not current president and he is not oriented to the year but oriented to location NAD, cooperative  HEENT: NC/AT, EOMI, PERRL, mmm, neck supple, no meningeal signs; Heart: regular   Lungs: no distress  Ext: He has 1+ edema bilateral lower extremities, the left upper extremity is splinted and wrapped in a cast  Psych: normal mood and affect  Skin: He has chronic discoloration in stocking distribution in the bilateral lower extremities    NEUROLOGIC EXAM:    Mental Status:  Patient alert to himself, he knew the previous president not current president and he is not oriented to the year but oriented to location NAD, speech clear, language fluent, repetition and naming intact, follows commands appropriately    Cranial Nerve Exam:   CN II-XII: , PERRL, VFF, no nystagmus, no gaze paresis, sensation V1-V3 intact b/l, muscles of facial expression symmetric; hearing intact to conversational tone, palate elevates symmetrically, shoulder elevation symmetric and tongue protrudes midline with movement side to side.     Motor Exam:       Antigravity x4 with no drift spasticity or rigidity noted    Deep Tendon Reflexes: 1/4 biceps, triceps, brachioradialis, patellar, and achilles b/l; flexor plantar responses b/l    Sensation: Intact light touch/pinprick/vibration UE's/LE's b/l, all decreased in bilateral lower extremity stocking distribution    Coordination/Cerebellum:       Tremors--none      Rapidly alternating movements: no dysdiadochokinesia b/l             Finger-to-Nose: no dysmetria b/l    Gait and stance:      Gait: deferred for safety as patient did not want to get up at the time he wanted to go to bed      LABS:     Recent Labs     02/01/21  1240 02/01/21  2121 02/02/21  0639   WBC 22.9*  --  20.3*     --  138   K 4.2  --  3.8   CL 96*  --  104   CO2 20*  --  21   BUN 38*  --  29* CREATININE 0.9  --  0.7*   GLUCOSE 110* 109 94   INR 1.33  --   --      B12 WNL    IMAGING:    CT head nonacute      ASSESSMENT/PLAN:     3 77year old male with acute mechanical fall now with TME superimposed on ETOH intoxicaiton and PN with now acute CISCO with rhabdomyolysis. Suspect concussion as well. Plan of care as follows:  1. Neuro exam:  1. Stocking distribution sensation loss BLE  2. Neurodiagnostics:  1. CT head non acute  3. Medications:  1. Recommend B12 and B1  4. PT/OT/ST:  1. Balance therapy   5. Follow up:  1. Memory work up outpatient    2. No further studies         Thank you for allowing us to participate in the care of your patient. If there are any questions regarding evaluation please feel free to contact us.      Lonny Blas, APRN - CNP, 2/2/2021

## 2021-02-02 NOTE — CONSULTS
Lab Results   Component Value Date    LABALBU 2.7 02/02/2021     PT/INR:    Lab Results   Component Value Date    PROTIME 16.1 02/01/2021    INR 1.33 02/01/2021     HgBA1c:  No results found for: LABA1C      Assessment:     Patient Active Problem List   Diagnosis    Rhabdomyolysis       Measurements:  Wound 02/02/21 Foot Left;Dorsal (Active)   Wound Etiology Other 02/02/21 1255   Dressing Status New dressing applied 02/02/21 1255   Wound Cleansed Cleansed with saline 02/02/21 1255   Wound Length (cm) 1 cm 02/02/21 1255   Wound Width (cm) 0.7 cm 02/02/21 1255   Wound Depth (cm) 0.1 cm 02/02/21 1255   Wound Surface Area (cm^2) 0.7 cm^2 02/02/21 1255   Wound Volume (cm^3) 0.07 cm^3 02/02/21 1255   Distance Tunneling (cm) 0 cm 02/02/21 1255   Tunneling Position ___ O'Clock 0 02/02/21 1255   Undermining Starts ___ O'Clock 0 02/02/21 1255   Undermining Ends___ O'Clock 0 02/02/21 1255   Undermining Maxium Distance (cm) 0 02/02/21 1255   Wound Assessment Eschar dry;Pink/red;Purple/maroon 02/02/21 1255   Drainage Amount None 02/02/21 1255   Odor None 02/02/21 1255   Leyla-wound Assessment Edematous; Warm;Blanchable erythema 02/02/21 1255   Margins Attached edges 02/02/21 1255   Number of days: 0       Response to treatment:  With complaints of pain. Pain Assessment:  Severity:  moderate  Quality of pain: sharp  Wound Pain Timing/Severity: with touching of left foot  Premedicated: no    Plan:     Plan of Care: Wound 02/02/21 Foot Left;Dorsal-Dressing/Treatment: (optifoam AG, kerlix)     Seen at 1255. Pt in chair. Agreeable to wound assessment. Left dorsal foot with dry yellow/purple area of eschar surrounded by erythema and edema. Painful to slight touch and warm. Pt stated has had for approximately 2-3 weeks and may have had longer. Poor historian. Picture and measurements taken. Treatment applied as above. Suspicious for possible infection-recommend surgeon evaluate.  Updated Dr. Saad Peck on assessment and recommendations. Xray left foot pending. Heels intact. Pt does not want to return to bed at this time for posterior assessment. Will reattempt. Atmos air pump applied to mattress. Pt is at mild risk for skin breakdown AEB Pee. Follow Pee orders. Return at 1515 to do posterior assessment. Pt in bed. Stage 2 seen to left sacrum. Tender to touch. Surrounding skin with blanchable erythema/purple discoloration. Picture and measurements taken. Treatment applied as above. Positioned to right side with heels floated with pillow support. Specialty Bed Required : yes  [] Low Air Loss   [x] Pressure Redistribution  [] Fluid Immersion  [] Bariatric  [] Total Pressure Relief  [] Other:     Discharge Plan:  Placement for patient upon discharge: tbd  Hospice Care: no  Patient appropriate for Outpatient [address]: no    Patient/Caregiver Teaching:  Level of patient/caregiver understanding able to:   Needs reinforcement.         Electronically signed by Iman Torres RN,  on 2/2/2021 at 1:37 PM

## 2021-02-02 NOTE — H&P
chronic medical conditions:  Continue all home meds except stated above or contraindicated.  Tobacco abuse    Diet DIET RENAL;   DVT Prophylaxis [x] Lovenox, []  Heparin, [] SCDs, [] Ambulation   GI Prophylaxis [] PPI,  [] H2 Blocker,  [] Carafate,  [] Diet/Tube Feeds   Code Status No Order   Disposition Patient requires continued admission due to Rhabdomyolysis   MDM [] Low, [] Moderate,[x]  High  Patient's risk as above due to acuity of condition with potential for decompensation. History of Present Illness:     Chief Complaint: Rhabdomyolysis    Andressa Yin is a 77 y.o.  male with family history unable to be obtained in the past medical history as below, who presents via EMS after being found in his home on the ground. Patient has not answered his phone for 2 days so his friends did a well check. Patient states it happened the day before yesterday after eating breakfast.  Patient states he got up after eating breakfast and turned around and fell to the ground. Patient states reason for falling to the ground was \"my apartment so tight I could not turn around. \"  Patient does endorse having 1 beer prior to his [employer] and distant gravy and 1 beer immediately afterwards prior to the fall. Prior to fall patient denies any tinnitus, visual changes, lightheadedness, dizziness, speech difficulties, loss of consciousness, chest pains, palpitations, shortness of breath, seizure-like activity, or unilateral weakness or numbness. Patient describes falling onto outstretched arms and hurting his left wrist.  Patient endorses being neurovascularly intact distally of all extremities. Patient endorses pain with closing left wrist.  Patient states after the fall he could not get to his phone or be able to get up. Patient states \"I am so thirsty I have not had a drink in about 2 days. \"  Patient does endorse 4 beers per day, 2 at the [employer] and 2 at home per day.   Otherwise fevers, chills, cough, abdominal pain, Extraocular movements intact. Conjunctiva/sclera: Conjunctivae normal.      Pupils: Pupils are equal, round, and reactive to light. Neck:      Musculoskeletal: Neck supple. Cardiovascular:      Rate and Rhythm: Regular rhythm. Tachycardia present. Pulses: Normal pulses. Heart sounds: Normal heart sounds. No murmur. No gallop. Pulmonary:      Effort: Pulmonary effort is normal. No tachypnea or respiratory distress. He is not intubated. Breath sounds: Normal breath sounds. No decreased breath sounds, wheezing, rhonchi or rales. Abdominal:      General: There is no distension. Palpations: Abdomen is soft. Tenderness: There is no abdominal tenderness. There is no guarding or rebound. Musculoskeletal:      Left wrist: He exhibits decreased range of motion, tenderness, bony tenderness, swelling and deformity. He exhibits no crepitus and no laceration. Left ankle: He exhibits decreased range of motion and swelling. He exhibits no ecchymosis, no deformity, no laceration and normal pulse. Tenderness. Right lower leg: No edema. Left lower leg: No edema. Feet:      Right foot:      Toenail Condition: Right toenails are abnormally thick and long. Left foot:      Toenail Condition: Left toenails are abnormally thick and long. Skin:     General: Skin is warm and dry. Capillary Refill: Capillary refill takes less than 2 seconds. Neurological:      General: No focal deficit present. Mental Status: He is alert and oriented to person, place, and time. Mental status is at baseline. Cranial Nerves: Cranial nerves are intact. No cranial nerve deficit, dysarthria or facial asymmetry. Sensory: Sensation is intact. No sensory deficit. Motor: Motor function is intact. No weakness, tremor, seizure activity or pronator drift. Coordination: Coordination is intact.  Finger-Nose-Finger Test normal.   Psychiatric:         Attention and Perception: Attention normal. He is attentive. Speech: Speech normal. Speech is not slurred. Behavior: Behavior is cooperative. Past Medical History:      Past Medical History:   Diagnosis Date    Alcohol abuse     Fall in elderly patient     Tobacco abuse      PSHX:  has no past surgical history on file.     Allergies: No Known Allergies    FAM HX: Unable to be assessed given confusion  Soc HX:   Social History     Socioeconomic History    Marital status: Single     Spouse name: Not on file    Number of children: Not on file    Years of education: Not on file    Highest education level: Not on file   Occupational History    Not on file   Social Needs    Financial resource strain: Not on file    Food insecurity     Worry: Not on file     Inability: Not on file    Transportation needs     Medical: Not on file     Non-medical: Not on file   Tobacco Use    Smoking status: Not on file   Substance and Sexual Activity    Alcohol use: Not on file    Drug use: Not on file    Sexual activity: Not on file   Lifestyle    Physical activity     Days per week: Not on file     Minutes per session: Not on file    Stress: Not on file   Relationships    Social connections     Talks on phone: Not on file     Gets together: Not on file     Attends Worship service: Not on file     Active member of club or organization: Not on file     Attends meetings of clubs or organizations: Not on file     Relationship status: Not on file    Intimate partner violence     Fear of current or ex partner: Not on file     Emotionally abused: Not on file     Physically abused: Not on file     Forced sexual activity: Not on file   Other Topics Concern    Not on file   Social History Narrative    Not on file       Medications:   Medications:    sodium chloride flush  10 mL Intravenous 2 times per day      Infusions:    sodium chloride 150 mL/hr at 02/01/21 1741     PRN Meds:     sodium chloride flush, 10 mL, PRN        Electronically signed by Woo Weiner DO on 2/1/2021 at 9:18 PM      This dictation was created with voice recognition software. While attempts have been made to review the dictation as it is transcribed, on occasion the spoken word can be misinterpreted by the technology leading to omissions or inappropriate words, phrases or sentences.

## 2021-02-02 NOTE — CONSULTS
· Perception: WNL    Activities of Daily Living (ADLs):  · Feeding: Joseph (anticipate assist for bilateral tasks d/t limited use of LUE). · Grooming: Joseph (recommend pt complete in seated with assist for bilateral tasks d/t limited use of LUE). · UB bathing: Joseph (assist d/t limtied use of LUE and decreased AROM of B shoulders). · LB bathing: ModA (recommend pt complete in seated with assist d/t decreased use of LUE and decreased ability to engage in distal reaching this date). · UB dressing: Joseph (anticipate assist d.t limited use of LUE and decreased AROM of B shoulders). · LB dressing: DEP (pt unable to teresa socks this date, limited by difficulty with distal reaching as well as limited use of LUE). · Toileting: MaxA (anticipate assist for SPT to [facility], toilet transfers, LB clothing manipulation, and george care). Cognitive and Psychosocial Functioning:  · Overall cognitive status: Oriented to self. Disoriented to location (reports he is at [facility], however knows that he is in [place] [address]) and time (reports it is February 2002). · Affect: Normal     Balance:   · Sitting: SBA-Joseph (pt demo intermittent episodes of posterior lean while seated upright at EOB). · Standing: Joseph (stood with RW, VC for upright posture, VC to avoid WB through LUE). Functional Mobility:  · Bed Mobility: ModA (pt performed supine to seated bed mobility with assist for BLE positioning, trunk support, and VC for sequencing throughout). · Transfers:   · Joseph (STS from EOB with RW, VC throughout for sequencing). · ModA (SPT to chair with RW, VC throughout for sequencing, assist for RW management). · Ambulation: Joseph (pt took side steps along EOB with RW, VC throughout for sequencing and for upright posture). AM-PAC 6 click short form for inpatient daily activity:   How much help from another person does the patient currently need. ..  Unable  Dep A Lot  Max A A Lot   Mod A A Little  Min A A Little CGA  SBA None   Mod I  Indep  Sup   1. Putting on and taking off regular lower body clothing? [x] 1    [] 2   [] 2   [] 3   [] 3   [] 4      2. Bathing (including washing, rinsing, drying)? [] 1   [] 2   [x] 2 [] 3 [] 3 [] 4   3. Toileting, which includes using toilet, bedpan, or urinal? [] 1    [x] 2   [] 2   [] 3   [] 3   [] 4     4. Putting on and taking off regular upper body clothing? [] 1   [] 2   [] 2   [x] 3   [] 3    [] 4      5. Taking care of personal grooming such as brushing teeth? [] 1   [] 2    [] 2 [x] 3    [] 3   [] 4      6. Eating meals? [] 1   [] 2   [] 2   [x] 3   [] 3   [] 4      Raw Score:  14     [24=0% impaired(CH), 23=1-19%(CI), 20-22=20-39%(CJ), 15-19=40-59%(CK), 10-14=60-79%(CL), 7-9=80-99%(CM), 6=100%(CN)]     Treatment:  Therapeutic Activity Training:   Therapeutic activity training was instructed today. Cues were given for safety, sequence, UE/LE placement, awareness, and balance. Activities performed today included bed mobility training, sup-sit, sit-stand, SPT. Safety Measures: Gait belt used, Left in chair, Alarm in place    Assessment:  Assessment  Performance deficits / Impairments: Decreased functional mobility , Decreased strength, Decreased endurance, Decreased ADL status, Decreased coordination, Decreased high-level IADLs, Decreased posture, Decreased balance, Decreased cognition, Decreased ROM  Treatment Diagnosis: Rhabdomylosis  Prognosis: Good  Decision Making: Medium Complexity  REQUIRES OT FOLLOW UP: Yes  Discharge Recommendations: [address]    Pt is a 77year old M admitted for Rhabdomylosis . Pt reports that prior to admission he was IND in ADLs, IADLs, and Mikaela for functional mobility. This date, pt demo decreased strength, balance, endurance, activity tolerance, and limited use of LUE impacting ADL status. Pt is currently functioning below baseline and would benefit from skilled OT services at SNF.     Plan:  Plan  Times per week: 2+  Times per day: Daily      Goals:  1. Pt will complete all aspects of bed mobility for EOB/OOB ADLs Joseph. 2. Pt will complete UB/LB bathing with Joseph and AE as needed. 3. Pt will complete all aspects of LB dressing with ModA and AE as needed. 4. Pt will complete all functional transfers to and from bed, chair, toilet, shower chair with CGA and AD. 5. Pt will ambulate HH distance to bathroom for toileting with CGA and AD. 6. Pt will complete all aspects of toileting task with Joseph. 7. Pt will complete oral hygiene/grooming routine in standing at sink with Joseph demo good dynamic standing balance for approx 8 minutes. 8. Pt will complete ther ex/ther act with focus on UB strengthening. Time:   Time in: 1050  Time out: 1123  Timed treatment minutes: 23  Total time: 33      Electronically signed by:       AILYN Miramontes/L, [address] [place] [address], .[phone]

## 2021-02-02 NOTE — PROGRESS NOTES
Physical Therapy  [facility] ACUTE CARE PHYSICAL THERAPY EVALUATION  Harley Machado, 1945, 3027/3027-A, 2/2/2021    History  [place]:  The primary encounter diagnosis was Non-traumatic rhabdomyolysis. Diagnoses of Leukocytosis, unspecified type and Fall in elderly patient were also pertinent to this visit. Patient  has a past medical history of Alcohol abuse, Fall in elderly patient, and Tobacco abuse. Patient  has no past surgical history on file. Subjective:  Patient states:  \"I feel so good to be out of this bed! \"   Pain:  Left wrist discomfort. 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 No formal WB status in the chart on LUE and therefore NWB was utilized this date. Home Setup/Prior level of function  Social/Functional History  Lives With: Alone  Type of Home: House  Home Layout: One level  Home Access: (2 steps to enter)  Bathroom Shower/Tub: Tub/Shower unit  Home Equipment: Rolling walker, 4 wheeled walker, Cane(Pt reports at baseline he ambulates with his RW in the community. Within the house pt ambulates with a cane.)  Receives Help From: (Pt reports the  at his house checks in on pt as needed.)  ADL Assistance: [facility] [address]: Independent  Homemaking Responsibilities: Yes  Ambulation Assistance: Independent  Transfer Assistance: Independent  Active : Yes  Mode of Transportation: Car    Examination of body systems (includes body structures/functions, activity/participation limitations):  · Observation:  Supine in bed upon arrival. Splinted left UE. Red and swollen LEs with wound to Left foot.  Cooperative with therapy though tangential and easily distracted You have no availability in Ortho until 3/17/17. Will route to Dr Duque to review and advise please.   · Vision:  [facility]   · Hearing:  [facility]   · Cardiopulmonary:  Stable vitals throughout session. Musculoskeletal  · ROM R/L:  [facility] BLEs   · Strength R/L:  BLEs grossly 4+/5, Minimal strength deficits observed in function and endurance. · Neuro:  Diminished sensation to bilat feet     Mobility/treatment:   · Rolling L/R:  Partial roll to the left modA with use of right UE on rail   · Supine to sit:  modA for trunk boost and hip advancement   · Transfers:   · Sit to stand: Christal from EOB for lift, weight shift, and steadying   · Stand to sit: modA for controlling sit to EOB   · Step pivot: min to modA for balance and device management using only right side of Rw. Plan to trial with platform RW next   · Sitting balance:  SBA at EOB static and light dynamic without UE support x 10+ minutes   · Standing balance:  CGA to Christal at RW static (LUE resting on walker but not used for body weight support) x ~2 minutes   · Gait: ~3ft with RW Christal/modA for steadying with side steps during transfer to chair. Short stride with slow pace and minimal foot clearance. Fwd posture with quick fatigue. · Educated pt on POC, role of PT, DME use, NWB (protection of LUE at this time). Cues for sequencing to inc safety and indep with mobility     [facility] 6 Clicks Inpatient Mobility:  AM-PAC Inpatient Mobility Raw Score : 13    Safety: patient left in chair, call light within reach,  gait belt used. Assessment:  Pt is a 77year old male found on the floor of his home with AMS. 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 Associated fragmentation of the lunate which is also favored to be chronic.  No definite acute fracture identified. \" Recommend subacute rehab once medically stable. At baseline he is Kelley with gross mobility and ADLs. He is requiring up to [address] with limited tolerance to activity.  He would benefit from continued therapy to address his current deficits, dec potential fall risk, and restore function. Complexity: Moderate  Prognosis: Good, no significant barriers to participation at this time.    Plan Times per week: 3+/week, 1 week  Discharge Recommendations: Subacute/Skilled Nursing Facility  Equipment: continue to assess at next level of care     Goals:  Short term goals  Time Frame for Short term goals: 1 week  Short term goal 1: Pt will perform sit><supine Christal  Short term goal 2: Pt will transition sit><stand CGA  Short term goal 3: Pt will ambulte 20ft with LRAD CGA  Short term goal 4: Pt will ascend/descend 2 steps, single rail Christal       Treatment plan:  Bed mobility, transfers, balance, gait, TA, TX, stairs     Recommendations for NURSING mobility: stand pivot     Time:   Time in: 1047  Time out: 1122  Timed treatment minutes: 23  Total time: 35    Electronically signed by:    Jennifer Escobar [id number]  2/2/2021, 5:10 PM

## 2021-02-03 ENCOUNTER — APPOINTMENT (OUTPATIENT)
Dept: GENERAL RADIOLOGY | Age: 76
DRG: 500 | End: 2021-02-03
Payer: MEDICARE

## 2021-02-03 LAB — TOTAL CK: 350 IU/L (ref 38–174)

## 2021-02-03 PROCEDURE — 6370000000 HC RX 637 (ALT 250 FOR IP): Performed by: STUDENT IN AN ORGANIZED HEALTH CARE EDUCATION/TRAINING PROGRAM

## 2021-02-03 PROCEDURE — 2580000003 HC RX 258: Performed by: STUDENT IN AN ORGANIZED HEALTH CARE EDUCATION/TRAINING PROGRAM

## 2021-02-03 PROCEDURE — 97116 GAIT TRAINING THERAPY: CPT

## 2021-02-03 PROCEDURE — 6360000002 HC RX W HCPCS: Performed by: SURGERY

## 2021-02-03 PROCEDURE — 6360000002 HC RX W HCPCS: Performed by: STUDENT IN AN ORGANIZED HEALTH CARE EDUCATION/TRAINING PROGRAM

## 2021-02-03 PROCEDURE — 99222 1ST HOSP IP/OBS MODERATE 55: CPT | Performed by: SURGERY

## 2021-02-03 PROCEDURE — 99221 1ST HOSP IP/OBS SF/LOW 40: CPT | Performed by: ORTHOPAEDIC SURGERY

## 2021-02-03 PROCEDURE — 2580000003 HC RX 258: Performed by: SURGERY

## 2021-02-03 PROCEDURE — 36415 COLL VENOUS BLD VENIPUNCTURE: CPT

## 2021-02-03 PROCEDURE — 2700000000 HC OXYGEN THERAPY PER DAY

## 2021-02-03 PROCEDURE — 73630 X-RAY EXAM OF FOOT: CPT

## 2021-02-03 PROCEDURE — 87070 CULTURE OTHR SPECIMN AEROBIC: CPT

## 2021-02-03 PROCEDURE — 94761 N-INVAS EAR/PLS OXIMETRY MLT: CPT

## 2021-02-03 PROCEDURE — 87075 CULTR BACTERIA EXCEPT BLOOD: CPT

## 2021-02-03 PROCEDURE — 97535 SELF CARE MNGMENT TRAINING: CPT

## 2021-02-03 PROCEDURE — 1200000000 HC SEMI PRIVATE

## 2021-02-03 PROCEDURE — 2500000003 HC RX 250 WO HCPCS: Performed by: SURGERY

## 2021-02-03 PROCEDURE — 10060 I&D ABSCESS SIMPLE/SINGLE: CPT | Performed by: SURGERY

## 2021-02-03 PROCEDURE — 97530 THERAPEUTIC ACTIVITIES: CPT

## 2021-02-03 PROCEDURE — 87077 CULTURE AEROBIC IDENTIFY: CPT

## 2021-02-03 PROCEDURE — 82550 ASSAY OF CK (CPK): CPT

## 2021-02-03 RX ORDER — LIDOCAINE HYDROCHLORIDE 10 MG/ML
20 INJECTION, SOLUTION INFILTRATION; PERINEURAL ONCE
Status: COMPLETED | OUTPATIENT
Start: 2021-02-03 | End: 2021-02-03

## 2021-02-03 RX ADMIN — THIAMINE HYDROCHLORIDE 100 MG: 100 INJECTION, SOLUTION INTRAMUSCULAR; INTRAVENOUS at 09:11

## 2021-02-03 RX ADMIN — PIPERACILLIN AND TAZOBACTAM 3375 MG: 3; .375 INJECTION, POWDER, LYOPHILIZED, FOR SOLUTION INTRAVENOUS at 12:27

## 2021-02-03 RX ADMIN — PIPERACILLIN AND TAZOBACTAM 3375 MG: 3; .375 INJECTION, POWDER, LYOPHILIZED, FOR SOLUTION INTRAVENOUS at 20:59

## 2021-02-03 RX ADMIN — LIDOCAINE HYDROCHLORIDE 20 ML: 10 INJECTION, SOLUTION INFILTRATION; PERINEURAL at 11:24

## 2021-02-03 RX ADMIN — ACETAMINOPHEN 650 MG: 325 TABLET ORAL at 21:00

## 2021-02-03 RX ADMIN — THERA TABS 1 TABLET: TAB at 09:11

## 2021-02-03 RX ADMIN — SODIUM CHLORIDE, PRESERVATIVE FREE 10 ML: 5 INJECTION INTRAVENOUS at 09:11

## 2021-02-03 RX ADMIN — SODIUM CHLORIDE, PRESERVATIVE FREE 10 ML: 5 INJECTION INTRAVENOUS at 21:00

## 2021-02-03 RX ADMIN — VANCOMYCIN HYDROCHLORIDE 1000 MG: 1 INJECTION, POWDER, LYOPHILIZED, FOR SOLUTION INTRAVENOUS at 16:14

## 2021-02-03 ASSESSMENT — ENCOUNTER SYMPTOMS
GASTROINTESTINAL NEGATIVE: 1
RESPIRATORY NEGATIVE: 1
ALLERGIC/IMMUNOLOGIC NEGATIVE: 1
EYES NEGATIVE: 1

## 2021-02-03 ASSESSMENT — PAIN SCALES - GENERAL
PAINLEVEL_OUTOF10: 0

## 2021-02-03 NOTE — PROGRESS NOTES
[facility] PHARMACY VANCOMYCIN MONITORING SERVICE    Gonzales Hoang is a 77 y.o. male started on vancomycin for skin and soft tissue. Pharmacy not consulted, ordered by Dr Nathalia Hein. Indication for treatment: skin/soft tissue  Goal trough: 10-15 mcg/mL  Other antimicrobials: Zosyn    Ht Readings from Last 1 Encounters:   02/02/21 5' 10\" (1.778 m)     Wt Readings from Last 3 Encounters:   02/02/21 163 lb 14.4 oz (74.3 kg)        Pertinent Laboratory Values:   Temp Readings from Last 3 Encounters:   02/03/21 98.5 °F (36.9 °C) (Oral)     Recent Labs     02/01/21  1240 02/01/21  1616 02/02/21  0639   WBC 22.9*  --  20.3*   LACTATE  --  1.7  --      Recent Labs     02/01/21  1240 02/02/21  0639   BUN 38* 29*   CREATININE 0.9 0.7*     Estimated Creatinine Clearance: 96 mL/min (A) (based on SCr of 0.7 mg/dL (L)). Intake/Output Summary (Last 24 hours) at 2/3/2021 1203  Last data filed at 2/3/2021 0911  Gross per 24 hour   Intake 10 ml   Output 575 ml   Net -565 ml       Pertinent Cultures:  Date    Source   Results  02/03  Blood    Pending          Previous vancomycin levels:  TROUGH:  No results for input(s): VANCOTROUGH in the last 72 hours. RANDOM:  No results for input(s): VANCORANDOM in the last 72 hours. Assessment/Plan:  · 1000mg q12h  · Pharmacy will continue to monitor patient and adjust therapy as indicated    Fitz 02/05 @1500    Thank you for the consult.   Miguel Ángel Arellano RPh  2/3/2021 12:03 PM

## 2021-02-03 NOTE — PROGRESS NOTES
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 Communication with other providers:  PT RN    Subjective:  Patient states:  Agreeable to therapy. Pain: Pt reports pain in L wrist and L foot. Does not give numerical rating. Objective:    Observation:  Pt was received seated upright in chair upon arrival.   Objective Measures:  Vitals stable throughout session. Treatment, including education:  Self Care Training:   Cues were given for safety, sequence, UE/LE placement, visual cues, and balance. Activities performed today included dressing and toileting. Therapeutic Activity Training:   Therapeutic activity training was instructed today. Cues were given for safety, sequence, UE/LE placement, awareness, and balance. Activities performed today included bed mobility training, sup-sit, sit-stand, ambulation. Pt performed sit to stand from the chair with ModA, platform walker and VC throughout for sequencing. Pt demo increased need for assist d/t limited use of LUE to assist in pushing self up during transfers. Pt ambulated to the bathroom for toileting with Joseph for platform walker management and balance, pt required VC throughout for sequencing during ambulation. Pt performed stand to sit to toilet with [address] for control and sequencing. Pt was dependent for doffing and donning soiled depend this date. Pt performed STS from toilet with [address] and VC for sequencing. Pt ambulated back to chair with platform walker, Joseph and VC throughout.  Pt performed stand to sit to chair with Joseph for controlled descend and positioning. Pt was left seated in chair, all needs met, alarm in place. Assessment / Impression:     Patient's tolerance of treatment:  Good   Adverse Reaction: None  Significant change in status and impact:  Pt demo decreased cognition this date. Barriers to improvement:  Pain, Cognition, Balance, Limited use of LUE impacting ability to manage platform walker. Plan for Next Session:    Continue with POC    Time in:  1157  Time out:  1223  Timed treatment minutes:  26  Total treatment time:  26    Electronically signed by: Gene Feliz,   2/3/2021, 1:00 PM    Previously filed values:    Goals:  1. Pt will complete all aspects of bed mobility for EOB/OOB ADLs Joseph. 2. Pt will complete UB/LB bathing with Joseph and AE as needed. 3. Pt will complete all aspects of LB dressing with ModA and AE as needed. 4. Pt will complete all functional transfers to and from bed, chair, toilet, shower chair with CGA and AD. 5. Pt will ambulate HH distance to bathroom for toileting with CGA and AD. 6. Pt will complete all aspects of toileting task with Joseph. 7. Pt will complete oral hygiene/grooming routine in standing at sink with Joseph fountain good dynamic standing balance for approx 8 minutes. 8. Pt will complete ther ex/ther act with focus on UB strengthening.

## 2021-02-03 NOTE — PROGRESS NOTES
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 No formal WB status in the chart on LUE and therefore NWB was utilized this date. Communication with other providers:  OT   Subjective:  Patient states: \"Are you the one who got me into bed?\"   Pain:   Location, Type, Intensity (0/10 to 10/10): Left wrist discomfort, did not rate. Objective:    Observation:    Sitting in recliner upon arrival. Cooperative with therapy. Difficult processing cues today with some intermittent confusion and dec problem solving. Treatment, including education/measures:  Sit to stand: modA from recliner and toilet. Unable to use left grab bar with left wrist injury needing to use walker to stabilize and pull self up with. Was able to effectively push from arm rest of chair. Stand to sit: modA for controlling sit to recliner and toilet. Unable to use left grab bar in bathroom needing to stabilize with RW (held in place by therapist). Able to reach back for arm rest of chair with right UE when cues. Step pivot: Christal for balance with platform RW (2x). Constant cues for body positioning within walker and overall sequencing. Assistance needed to turn walker   Ambulation: ~10ft x 2 with platform RW Christal for balance. Slow pace with slightly fwd flexed posture, dec safety awareness to surroundings, assistance required for navigating and maneuvering walker (especially in small spaces).  Declined further ambulation at this time   Standing balance: x ~2 minutes x 2 static stance, CGA for safety   Sitting balance: SBA static, Christal for LUE support on light dynamic activity while managing LB dressing. Educated pt on POC, role of PT, DME use. Cues for sequencing, posture, weight shift, UE/LE placement to inc safety and indep with mobility   Assessment / Impression:    Patient's tolerance of treatment:  Good   Adverse Reaction: no  Significant change in status and impact:  no  Barriers to improvement:  Activity tolerance, strength, balance, pain, cognition, splinted LUE   Plan for Next Session:    Activity tolerance, strength, balance, gait, transfers, bed mobility   Time in:  1157  Time out:  1223  Timed treatment minutes:  26  Total treatment time:  26    Previously filed items:  Social/Functional History  Lives With: Alone  Type of Home: House  Home Layout: One level  Home Access: (2 steps to enter)  Bathroom Shower/Tub: Tub/Shower unit  Home Equipment: Rolling walker, 4 wheeled walker, Cane(Pt reports at baseline he ambulates with his RW in the community.  Within the house pt ambulates with a cane.)  Receives Help From: (Pt reports the  at his house checks in on pt as needed.)  ADL Assistance: [address] [place] [address] Avenue: Independent  Homemaking Responsibilities: Yes  Ambulation Assistance: Independent  Transfer Assistance: Independent  Active : Yes  Mode of Transportation: Car  Short term goals  Time Frame for Short term goals: 1 week  Short term goal 1: Pt will perform sit><supine Christal  Short term goal 2: Pt will transition sit><stand CGA  Short term goal 3: Pt will ambulte 20ft with LRAD CGA  Short term goal 4: Pt will ascend/descend 2 steps, single rail Christal       Electronically signed by:    Alana Plasencia [id number]  2/3/2021, 12:11 PM

## 2021-02-03 NOTE — PLAN OF CARE
Problem: Pain:  Goal: Pain level will decrease  Description: Pain level will decrease  Outcome: Ongoing     Problem: Pain:  Goal: Control of acute pain  Description: Control of acute pain  Outcome: Ongoing     Problem: Pain:  Goal: Control of chronic pain  Description: Control of chronic pain  Outcome: Ongoing     Problem: Wound:  Goal: Will show signs of wound healing; wound closure and no evidence of infection  Description: Will show signs of wound healing; wound closure and no evidence of infection  Outcome: Ongoing     Problem: Pressure Ulcer:  Goal: Signs of wound healing will improve  Description: Signs of wound healing will improve  Outcome: Ongoing     Problem: Pressure Ulcer:  Goal: Absence of new pressure ulcer  Description: Absence of new pressure ulcer  Outcome: Ongoing     Problem: Pressure Ulcer:  Goal: Will show no infection signs and symptoms  Description: Will show no infection signs and symptoms  Outcome: Ongoing     Problem: Arterial:  Goal: Optimize blood flow for wound healing  Description: Optimize blood flow for wound healing  Outcome: Ongoing     Problem: Venous:  Goal: Signs of wound healing will improve  Description: Signs of wound healing will improve  Outcome: Ongoing     Problem: Smoking cessation:  Goal: Ability to formulate a plan to maintain a tobacco-free life will be supported  Description: Ability to formulate a plan to maintain a tobacco-free life will be supported  Outcome: Ongoing     Problem: Compression therapy:  Goal: Will be free from complications associated with compression therapy  Description: Will be free from complications associated with compression therapy  Outcome: Ongoing     Problem: Weight control:  Goal: Ability to maintain an optimal weight for height and age will be supported  Description: Ability to maintain an optimal weight for height and age will be supported  Outcome: Ongoing     Problem: Falls - Risk of:  Goal: Will remain free from falls  Description: Will remain free from falls  Outcome: Ongoing     Problem: Blood Glucose:  Goal: Ability to maintain appropriate glucose levels will improve  Description: Ability to maintain appropriate glucose levels will improve  Outcome: Ongoing     Problem: Falls - Risk of:  Goal: Will remain free from falls  Description: Will remain free from falls  Outcome: Ongoing     Problem: Falls - Risk of:  Goal: Absence of physical injury  Description: Absence of physical injury  Outcome: Ongoing     Problem: Falls - Risk of:  Goal: Will remain free from falls  Description: Will remain free from falls  Outcome: Ongoing     Problem: Falls - Risk of:  Goal: Absence of physical injury  Description: Absence of physical injury  Outcome: Ongoing     Problem: Skin Integrity:  Goal: Will show no infection signs and symptoms  Description: Will show no infection signs and symptoms  Outcome: Ongoing     Problem: Skin Integrity:  Goal: Absence of new skin breakdown  Description: Absence of new skin breakdown  Outcome: Ongoing

## 2021-02-03 NOTE — PROGRESS NOTES
Hospitalist Progress Note      Name:  Gonzales Hoang /Age/Sex: 1945  (77 y.o. male)   MRN & CSN:  [id number] & [id number] Admission Date/Time: 2021  2:05 PM   Location:  [address] [place] [address] PCP: No primary care provider on file. Hospital Day: 3    Assessment and Plan:   Gonzales Hoang is a 77 y.o.  male  who presents with Rhabdomyolysis    --Nontraumatic Rhadomyolysis   Due to Prolonged immobilization after a fall. No CISCO. CK decreasing with fluid resuscitation (2315->816->350). Plan: Stop IVF today. --Mechanical fall at home - PT/OT    --Left Triquetral fracture, likely from fall on outstretched arm. Plan: Ortho consulted. Awaiting recommendation. --Left foot abscess (present on admission)  Located around the Right hallux base. No evidence of osteomyelitis on Xray. S/p I&D on 2/3/21. On Vanc and Zosyn. Will F/u wound culture results and adjust antibiotic accordingly. General surgery input appreciated. --Alcohol use disorder. Serum alcohol not elevated. No withdrawal symptoms at this time. Plan: folate and thiamine. --Incidental Rt Adrenal nodule - repeat imaging in 1 year. PCP to f/u. --Abnormal LFTs - from rhabdomyloysis. Improving. --Leukocytosis (22K) - possibly due to abscess vs reactive. No fever or chills. Antibiotic mgt as above. --Constipation - CT abd/pel shows stools in rectal vault. Plan: Miralaxa PRN    Diet DIET GENERAL; Dental Soft   DVT Prophylaxis [] Lovenox, []  Heparin, [] SCDs, [] Ambulation   GI Prophylaxis [] PPI,  [] H2 Blocker,  [] Carafate,  [] Diet/Tube Feeds   Code Status Full Code   Disposition Pending PT/OT. MDM [] Low, [x] Moderate,[]  High     History of Present Illness:   Patient seen and examined. No acute issues overnight. Left wrist pain well controlled. CK decreasing. He had I&D for left foot abscess today. Ten point ROS reviewed negative, unless as noted above    Objective:        Intake/Output Summary (Last 24 hours) at 2/3/2021 1209  Last data filed at 2/3/2021 0911  Gross per 24 hour   Intake 10 ml   Output 575 ml   Net -565 ml      Vitals:   Vitals:    02/03/21 0900   BP: 125/80   Pulse: 104   Resp: 18   Temp: 98.5 °F (36.9 °C)   SpO2: 96%     Physical Exam:   GEN Awake male, sitting upright in bed. RESP Breathing comfortably on RA. CARDIO/VASC S1/S2 auscultated. Regular rate without appreciable murmurs. No peripheral edema. GI Abdomen is soft without significant tenderness, masses, or guarding. Bowel sounds are normoactive. MSK Left wrist splinted. SKIN Normal coloration, warm, dry. NEURO  normal speech, no lateralizing weakness. PSYCH Awake, alert, oriented x 3.      Medications:   Medications:    vancomycin  1,000 mg Intravenous Q12H    piperacillin-tazobactam  3,375 mg Intravenous Q8H    sodium chloride flush  10 mL Intravenous 2 times per day    enoxaparin  40 mg Subcutaneous Daily    multivitamin  1 tablet Oral Daily    thiamine (VITAMIN B1) IVPB  100 mg Intravenous Daily      Infusions:     PRN Meds:     sodium chloride flush, 10 mL, PRN      promethazine, 12.5 mg, Q6H PRN    Or      ondansetron, 4 mg, Q6H PRN      polyethylene glycol, 17 g, Daily PRN      acetaminophen, 650 mg, Q6H PRN    Or      acetaminophen, 650 mg, Q6H PRN        CBC   Recent Labs     02/01/21  1240 02/02/21  0639   WBC 22.9* 20.3*   HGB 17.2 14.9   HCT 49.2 44.7    194      BMP   Recent Labs     02/01/21  1240 02/02/21  0639    138   K 4.2 3.8   CL 96* 104   CO2 20* 21   PHOS  --  2.5   BUN 38* 29*   CREATININE 0.9 0.7*       Radiology report reviewed     Electronically signed by Tairk Gong MD on 2/3/2021 at 12:09 PM

## 2021-02-03 NOTE — PROCEDURES
Incision and Drainage (I&D) Procedure Note    Patient ID:  Pamela Shaver  [id number]  77 y.o.  1945    Indications: left foot abscess    Pre-operative Diagnosis: Abscess of left foot    Post-operative Diagnosis: Same    Procedure:  I&D of left foot abscess    Surgeon: Cindy Cunningham II, MD FACS    Findings:  C/w post op dx    Estimated Blood Loss:  5 mL     Anesthesia: Local    Specimen(s): Cx of left foot abscess         Complications:  None; patient tolerated the procedure well. Condition: stable    Procedure Details: The patient was positioned appropriately and the skin over the abscess site was prepped with betadine and draped in a sterile fashion. Local anesthesia was obtained by infiltration using 1% Lidocaine without epinephrine. An incision was then made over the greatest area of fluctuance and approximately 5 cc of purulent, serous and bloody material was expressed. Loculations were not present. Wound was cultured with a swab. The drainage cavity was then dressed with a sterile dressing. The patient tolerated the procedure well.       Shayy Mcguire MD

## 2021-02-03 NOTE — CONSULTS
Department of General Surgery   Surgical Service Dr. Noemí Ramesh   Consult Note    Date of Consult: 2/3/21    Reason for Consult:  Possible left foot abscess    Requesting Physician:  Hospitalist    CHIEF COMPLAINT:  Admitted s/p fall    History Obtained From:  patient, electronic medical record    HISTORY OF PRESENT ILLNESS:      The patient is a 77 y.o. male who presents with complaints described as:     Location: fall from standing onto left side  Quality: dull pain  Severity: \"depends\" denies on 10 pt scale currently   Duration: Over last week  Timing: Acute  Context: found on ground, apparently didn't answer phone for 2 days  Modifying factors: denies  Associated signs and symptoms: denies    During stay pt was thought to have left foot abscess and a c/s was placed to General Surgery. Pt denies previous foot abscess. Denies drainage from site. Does state it is painful. Past Medical History:    Past Medical History:   Diagnosis Date    Alcohol abuse     Fall in elderly patient     Tobacco abuse        Past Surgical History:    No past surgical history on file.     Current Medications:   Current Facility-Administered Medications   Medication Dose Route Frequency Provider Last Rate Last Admin    lidocaine 1 % injection 20 mL  20 mL Intradermal Once Kamala Ruvalcaba II, MD        sodium chloride flush 0.9 % injection 10 mL  10 mL Intravenous 2 times per day [facility], DO   10 mL at 02/03/21 0911    sodium chloride flush 0.9 % injection 10 mL  10 mL Intravenous PRN [facility], DO        enoxaparin (LOVENOX) injection 40 mg  40 mg Subcutaneous Daily [facility], DO   Stopped at 02/03/21 0906    promethazine (PHENERGAN) tablet 12.5 mg  12.5 mg Oral Q6H PRN [facility], DO        Or    ondansetron (ZOFRAN) injection 4 mg  4 mg Intravenous Q6H PRN [facility], DO        polyethylene glycol (GLYCOLAX) packet 17 g  17 g Oral Daily PRN [facility], DO        acetaminophen (TYLENOL) tablet 650 mg  650 mg Oral Q6H PRN Beto Darryn, DO        Or    acetaminophen (TYLENOL) suppository 650 mg  650 mg Rectal Q6H PRN Beto Darryn, DO        multivitamin 1 tablet  1 tablet Oral Daily Beto Darryn, DO   1 tablet at 02/03/21 0911    thiamine (B-1) 100 mg in sodium chloride 0.9 % 50 mL IVPB  100 mg Intravenous Daily Beto Darryn, DO   Stopped at 02/03/21 1018       Allergies:  Patient has no known allergies. Social History:   Social History     Socioeconomic History    Marital status: Single     Spouse name: None    Number of children: None    Years of education: None    Highest education level: None   Occupational History    None   Social Needs    Financial resource strain: None    Food insecurity     Worry: None     Inability: None    Transportation needs     Medical: None     Non-medical: None   Tobacco Use    Smoking status: None   Substance and Sexual Activity    Alcohol use: None    Drug use: None    Sexual activity: None   Lifestyle    Physical activity     Days per week: None     Minutes per session: None    Stress: None   Relationships    Social connections     Talks on phone: None     Gets together: None     Attends Restorationism service: None     Active member of club or organization: None     Attends meetings of clubs or organizations: None     Relationship status: None    Intimate partner violence     Fear of current or ex partner: None     Emotionally abused: None     Physically abused: None     Forced sexual activity: None   Other Topics Concern    None   Social History Narrative    None       Family History:   History reviewed. No pertinent family history. REVIEW OFSYSTEMS:    Review of Systems   Constitutional: Positive for appetite change and fatigue. HENT: Negative. Eyes: Negative. Respiratory: Negative. Cardiovascular: Negative. Gastrointestinal: Negative. Endocrine: Negative. Genitourinary: Negative.     Musculoskeletal: Positive for arthralgias, gait problem and joint swelling. Skin: Positive for wound. Allergic/Immunologic: Negative. Hematological: Negative. Psychiatric/Behavioral: Negative. PHYSICAL EXAM:  Vitals:    02/02/21 1700 02/02/21 1841 02/02/21 2018 02/03/21 0900   BP:  126/75 126/77 125/80   Pulse:  103 101 104   Resp:  17 20 18   Temp:  98.4 °F (36.9 °C) 98.3 °F (36.8 °C) 98.5 °F (36.9 °C)   TempSrc:  Oral Oral Oral   SpO2:    96%   Weight:       Height: 5' 10\" (1.778 m)          Physical Exam  Vitals signs reviewed. Constitutional:       Comments: Sitting in chair, eating, responds appropriately     HENT:      Head: Normocephalic and atraumatic. Right Ear: External ear normal.      Left Ear: External ear normal.      Nose: Nose normal.   Eyes:      General:         Right eye: No discharge. Left eye: No discharge. Extraocular Movements: Extraocular movements intact. Neck:      Musculoskeletal: Normal range of motion. Cardiovascular:      Rate and Rhythm: Normal rate. Pulmonary:      Effort: Pulmonary effort is normal.   Abdominal:      Palpations: Abdomen is soft. Tenderness: There is no abdominal tenderness. Musculoskeletal:      Comments: B/l chronic appearing vascular skin changes wo lower extremities, intact motor and sensation b/l. Proximal to left great toe on the dorsal surface there is ~ 1 cm area of fluctuance. It is tender and there is surrounding erythema. No active drainage. Skin:     General: Skin is warm. Neurological:      General: No focal deficit present. Mental Status: He is alert.    Psychiatric:         Mood and Affect: Mood normal.           DATA:    CBC with Differential:    Lab Results   Component Value Date    WBC 20.3 02/02/2021    RBC 4.24 02/02/2021    HGB 14.9 02/02/2021    HCT 44.7 02/02/2021     02/02/2021    .4 02/02/2021    MCH 35.1 02/02/2021    MCHC 33.3 02/02/2021    RDW 12.4 02/02/2021    SEGSPCT 89.4 02/01/2021 LYMPHOPCT 2.6 02/01/2021    MONOPCT 6.8 02/01/2021    BASOPCT 0.3 02/01/2021    MONOSABS 1.6 02/01/2021    LYMPHSABS 0.6 02/01/2021    EOSABS 0.0 02/01/2021    BASOSABS 0.1 02/01/2021    DIFFTYPE AUTOMATED DIFFERENTIAL 02/01/2021     CMP:    Lab Results   Component Value Date     02/02/2021    K 3.8 02/02/2021     02/02/2021    CO2 21 02/02/2021    BUN 29 02/02/2021    CREATININE 0.7 02/02/2021    GFRAA >60 02/02/2021    LABGLOM >60 02/02/2021    GLUCOSE 94 02/02/2021    PROT 5.1 02/02/2021    LABALBU 2.7 02/02/2021    CALCIUM 8.2 02/02/2021    BILITOT 1.0 02/02/2021    ALKPHOS 70 02/02/2021    AST 49 02/02/2021    ALT 32 02/02/2021     BMP:    Lab Results   Component Value Date     02/02/2021    K 3.8 02/02/2021     02/02/2021    CO2 21 02/02/2021    BUN 29 02/02/2021    LABALBU 2.7 02/02/2021    CREATININE 0.7 02/02/2021    CALCIUM 8.2 02/02/2021    GFRAA >60 02/02/2021    LABGLOM >60 02/02/2021    GLUCOSE 94 02/02/2021       XR left foot:  No acute osseous injury of the left foot.       Osteopenia with osteoarthritis most pronounced at the 1st metatarsophalangeal   joint.         Severe degenerative changes of the 1st MTP joint with associated soft tissue   swelling.  Degenerative changes are also noted in the midfoot.       Otherwise no acute osseous abnormality. CT A/P  1. No acute intra-abdominal process identified. 2. Moderate amount of stool at the rectal vault. 3. Severe atherosclerotic disease. 4. Cholelithiasis. 5. 1.1 cm indeterminate right adrenal nodule.  Given size and Hounsfield   units, findings reflect probable adenoma.  Current guidelines recommend 1   year follow-up adrenal washout CT.  If stable greater than or equal to 1   year, no further follow-up imaging. CTA Pulmonary:      No evidence of pulmonary embolism or acute pulmonary abnormality.       Cholelithiasis without CT evidence of acute cholecystitis.      PXR and CXR- no acute abnormalities    CT c spine - negative acute    CT head -   1. High left posterior scalp soft tissue swelling but no acute intracranial   abnormality. 2. Diffuse cerebral atrophy with chronic small vessel ischemic disease. IMPRESSION:        Patient Active Problem List:     Rhabdomyolysis      76 y/o M with left foot abscess    PLAN:    -Cx obtained  -Will plan bedside I&D (refer to procedure note)  -Abx Karyle Tico / Santiago Dinning. De-escalate based on Cx. Pharmacy to does.   -Above plan d/w pt and pt's nurse.          Ralf Aquino MD

## 2021-02-03 NOTE — CONSULTS
ORTHOPEDIC CONSULT      2/3/2021    Patient name: Pamela Shaver  : 1945    CHIEF COMPLAINT  l wrist pain    HPI  The patient was seen and examined. Pamela Shaver is a 77 y.o. male who we are asked to see for a painful swollen LEFT wrist after a fall. Fair historian. Pt place in splint. 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    PAST MEDICAL HISTORY  Past Medical History:   Diagnosis Date    Alcohol abuse     Fall in elderly patient     Tobacco abuse        CURRENT MEDICATIONS  Prior to Admission medications    Not on File       ALLERGIES  No Known Allergies    SURGICAL HISTORY  No past surgical history on file. FAMILY HISTORY  History reviewed. No pertinent family history.     SOCIAL HISTORY  Social History     Socioeconomic History    Marital status: Single     Spouse name: None    Number of children: None    Years of education: None    Highest education level: None   Occupational History    None   Social Needs    Financial resource strain: None    Food insecurity     Worry: None     Inability: None    Transportation needs     Medical: None     Non-medical: None   Tobacco Use    Smoking status: None   Substance and Sexual Activity    Alcohol use: None    Drug use: None    Sexual activity: None   Lifestyle    Physical activity     Days per week: None     Minutes per session: None    Stress: None   Relationships    Social connections     Talks on phone: None     Gets together: None     Attends Mormonism service: None     Active member of club or organization: None     Attends meetings of clubs or organizations: None     Relationship status: None    Intimate partner violence     Fear of current or ex partner: None     Emotionally abused: None     Physically abused: None     Forced sexual activity: None   Other Topics Concern    None   Social History Narrative    None       REVIEW OF SYSTEMS      PHYSICAL EXAM  VITAL SIGNS: /84   Pulse 108   Temp 98.6 °F (37 °C) (Oral)   Resp 18   Ht 5' 10\" (1.778 m)   Wt 163 lb 14.4 oz (74.3 kg)   SpO2 96%   BMI 23.52 kg/m²   General Appearance Alert, well appearing, No acute distress sitting in chair                           Lymphatics No adenopathy   Musculoskeletal Painful with rom  Df 20 pf 20  Mod swelling  Wiggles digits up and down well  Moderate deformity but some also from tight splint   Skin Normal. No rash or lesions   Neurological Awake, alert and oriented. No focal deficits. Motor and sensory intact   Psychiatric Normal       LABS   Recent Labs     02/01/21  1240 02/01/21  1616 02/02/21  0639   WBC 22.9*  --  20.3*   HCT 49.2  --  44.7     --  194     --  138   K 4.2  --  3.8   CL 96*  --  104   CO2 20*  --  21   BUN 38*  --  29*   CREATININE 0.9  --  0.7*   CALCIUM 9.1  --  8.2*   PHOS  --   --  2.5   MG 2.2  --   --    INR 1.33  --   --    AST 97*  --  49*   ALT 40  --  32   LACTATE  --  1.7  --      No components found for: HGBA1C    IMAGING  [facility] wrist    ASSESSMENT     Patient Active Problem List   Diagnosis    Rhabdomyolysis    Foot abscess, left        77 y.o. male with SLA wrist,   PLAN   1. Splint for comfort  Chronic arthritis  Would recommend aspiration ro infection of wrist joint  On abx     Electronically signed by: Anali Mullen MD, 2/3/2021 3:45 PM

## 2021-02-04 ENCOUNTER — APPOINTMENT (OUTPATIENT)
Dept: INTERVENTIONAL RADIOLOGY/VASCULAR | Age: 76
DRG: 500 | End: 2021-02-04
Payer: MEDICARE

## 2021-02-04 ENCOUNTER — APPOINTMENT (OUTPATIENT)
Dept: GENERAL RADIOLOGY | Age: 76
DRG: 500 | End: 2021-02-04
Payer: MEDICARE

## 2021-02-04 LAB
ANION GAP SERPL CALCULATED.3IONS-SCNC: 8 MMOL/L (ref 4–16)
BASOPHILS ABSOLUTE: 0.1 K/CU MM
BASOPHILS RELATIVE PERCENT: 0.3 % (ref 0–1)
BUN BLDV-MCNC: 10 MG/DL (ref 6–23)
CALCIUM SERPL-MCNC: 8.1 MG/DL (ref 8.3–10.6)
CHLORIDE BLD-SCNC: 98 MMOL/L (ref 99–110)
CO2: 26 MMOL/L (ref 21–32)
CREAT SERPL-MCNC: 0.6 MG/DL (ref 0.9–1.3)
CULTURE: ABNORMAL
CULTURE: ABNORMAL
DIFFERENTIAL TYPE: ABNORMAL
EOSINOPHILS ABSOLUTE: 0.1 K/CU MM
EOSINOPHILS RELATIVE PERCENT: 0.4 % (ref 0–3)
GFR AFRICAN AMERICAN: >60 ML/MIN/1.73M2
GFR NON-AFRICAN AMERICAN: >60 ML/MIN/1.73M2
GLUCOSE BLD-MCNC: 152 MG/DL (ref 70–99)
HCT VFR BLD CALC: 44.2 % (ref 42–52)
HEMOGLOBIN: 14.5 GM/DL (ref 13.5–18)
IMMATURE NEUTROPHIL %: 1.4 % (ref 0–0.43)
LYMPHOCYTES ABSOLUTE: 0.8 K/CU MM
LYMPHOCYTES RELATIVE PERCENT: 5.5 % (ref 24–44)
Lab: ABNORMAL
MAGNESIUM: 1.8 MG/DL (ref 1.8–2.4)
MCH RBC QN AUTO: 34.2 PG (ref 27–31)
MCHC RBC AUTO-ENTMCNC: 32.8 % (ref 32–36)
MCV RBC AUTO: 104.2 FL (ref 78–100)
MONOCYTES ABSOLUTE: 1.2 K/CU MM
MONOCYTES RELATIVE PERCENT: 7.5 % (ref 0–4)
NUCLEATED RBC %: 0 %
PDW BLD-RTO: 12.4 % (ref 11.7–14.9)
PLATELET # BLD: 197 K/CU MM (ref 140–440)
PMV BLD AUTO: 10.2 FL (ref 7.5–11.1)
POTASSIUM SERPL-SCNC: 3.3 MMOL/L (ref 3.5–5.1)
RBC # BLD: 4.24 M/CU MM (ref 4.6–6.2)
REASON FOR REJECTION: NORMAL
REJECTED TEST: NORMAL
SEGMENTED NEUTROPHILS ABSOLUTE COUNT: 13.1 K/CU MM
SEGMENTED NEUTROPHILS RELATIVE PERCENT: 84.9 % (ref 36–66)
SODIUM BLD-SCNC: 132 MMOL/L (ref 135–145)
SPECIMEN: ABNORMAL
TOTAL IMMATURE NEUTOROPHIL: 0.22 K/CU MM
TOTAL NUCLEATED RBC: 0 K/CU MM
WBC # BLD: 15.4 K/CU MM (ref 4–10.5)

## 2021-02-04 PROCEDURE — 6370000000 HC RX 637 (ALT 250 FOR IP): Performed by: STUDENT IN AN ORGANIZED HEALTH CARE EDUCATION/TRAINING PROGRAM

## 2021-02-04 PROCEDURE — 87077 CULTURE AEROBIC IDENTIFY: CPT

## 2021-02-04 PROCEDURE — 76000 FLUOROSCOPY <1 HR PHYS/QHP: CPT

## 2021-02-04 PROCEDURE — 97530 THERAPEUTIC ACTIVITIES: CPT

## 2021-02-04 PROCEDURE — 97535 SELF CARE MNGMENT TRAINING: CPT

## 2021-02-04 PROCEDURE — 87075 CULTR BACTERIA EXCEPT BLOOD: CPT

## 2021-02-04 PROCEDURE — 87070 CULTURE OTHR SPECIMN AEROBIC: CPT

## 2021-02-04 PROCEDURE — 87205 SMEAR GRAM STAIN: CPT

## 2021-02-04 PROCEDURE — 36415 COLL VENOUS BLD VENIPUNCTURE: CPT

## 2021-02-04 PROCEDURE — 2580000003 HC RX 258: Performed by: STUDENT IN AN ORGANIZED HEALTH CARE EDUCATION/TRAINING PROGRAM

## 2021-02-04 PROCEDURE — 89051 BODY FLUID CELL COUNT: CPT

## 2021-02-04 PROCEDURE — 2580000003 HC RX 258: Performed by: INTERNAL MEDICINE

## 2021-02-04 PROCEDURE — 80048 BASIC METABOLIC PNL TOTAL CA: CPT

## 2021-02-04 PROCEDURE — 6360000002 HC RX W HCPCS: Performed by: INTERNAL MEDICINE

## 2021-02-04 PROCEDURE — 1200000000 HC SEMI PRIVATE

## 2021-02-04 PROCEDURE — 99223 1ST HOSP IP/OBS HIGH 75: CPT | Performed by: INTERNAL MEDICINE

## 2021-02-04 PROCEDURE — 6360000002 HC RX W HCPCS: Performed by: STUDENT IN AN ORGANIZED HEALTH CARE EDUCATION/TRAINING PROGRAM

## 2021-02-04 PROCEDURE — 83735 ASSAY OF MAGNESIUM: CPT

## 2021-02-04 PROCEDURE — 85025 COMPLETE CBC W/AUTO DIFF WBC: CPT

## 2021-02-04 PROCEDURE — 2709999900 HC NON-CHARGEABLE SUPPLY

## 2021-02-04 PROCEDURE — 6360000002 HC RX W HCPCS: Performed by: SURGERY

## 2021-02-04 PROCEDURE — 2580000003 HC RX 258: Performed by: SURGERY

## 2021-02-04 PROCEDURE — 94761 N-INVAS EAR/PLS OXIMETRY MLT: CPT

## 2021-02-04 RX ADMIN — THIAMINE HYDROCHLORIDE 100 MG: 100 INJECTION, SOLUTION INTRAMUSCULAR; INTRAVENOUS at 10:59

## 2021-02-04 RX ADMIN — PIPERACILLIN AND TAZOBACTAM 3375 MG: 3; .375 INJECTION, POWDER, LYOPHILIZED, FOR SOLUTION INTRAVENOUS at 12:00

## 2021-02-04 RX ADMIN — VANCOMYCIN HYDROCHLORIDE 1000 MG: 1 INJECTION, POWDER, LYOPHILIZED, FOR SOLUTION INTRAVENOUS at 05:18

## 2021-02-04 RX ADMIN — SODIUM CHLORIDE, PRESERVATIVE FREE 10 ML: 5 INJECTION INTRAVENOUS at 10:59

## 2021-02-04 RX ADMIN — THERA TABS 1 TABLET: TAB at 10:58

## 2021-02-04 RX ADMIN — SODIUM CHLORIDE, PRESERVATIVE FREE 10 ML: 5 INJECTION INTRAVENOUS at 20:17

## 2021-02-04 RX ADMIN — AMPICILLIN SODIUM 2000 MG: 2 INJECTION, POWDER, FOR SOLUTION INTRAVENOUS at 16:32

## 2021-02-04 RX ADMIN — AMPICILLIN SODIUM 2000 MG: 2 INJECTION, POWDER, FOR SOLUTION INTRAVENOUS at 20:17

## 2021-02-04 RX ADMIN — ENOXAPARIN SODIUM 40 MG: 40 INJECTION SUBCUTANEOUS at 10:58

## 2021-02-04 RX ADMIN — PIPERACILLIN AND TAZOBACTAM 3375 MG: 3; .375 INJECTION, POWDER, LYOPHILIZED, FOR SOLUTION INTRAVENOUS at 06:38

## 2021-02-04 ASSESSMENT — PAIN SCALES - GENERAL
PAINLEVEL_OUTOF10: 0

## 2021-02-04 NOTE — PROGRESS NOTES
I am working as a [facility] [employer] clinical instructor today. I have a student assigned to this patient.

## 2021-02-04 NOTE — PLAN OF CARE
I am Joselyn Montague a [address] [place] [address] student assigned to Jenni Mayers, patient is in the bed and call light within reach.

## 2021-02-04 NOTE — CONSULTS
Infectious Disease Consult Note  2021   Patient Name: Pamela Shaver : 1945   Impression   Sepsis secondary to Group G Streptococcus bacteremia  o Left foot and possible left wrist involved. S/p left foot I and D. Orthopedic service evaluating his left wrist.   Rhabdomyolysis   Metabolic encephalopathy   Alcohol use disorder   Multi-morbidity: per PMHx  Plan:   Therapeutic: d/c vancomycin and Zosyn, start ampicillin   Diagnostic: repeat blood cx; crp and pct   F/u test:     Thank you for allowing me to consult in the care of this patient.  ------------------------  REASON FOR CONSULT: Infective syndrome   Requested by: Dr. Tariq Bello is a 77 y.o.  male who was admitted 2021 for further evaluation and management of fall. Diagnosed with acute kidney injury secondary to rhabdomyolysis. Furthermore the patient was seen to have a left foot abscess. Reports having an ulcer for the last 3 weeks. Blood culture positive for group G beta-hemolytic Streptococcus. Underwent an I&D of the left foot abscess on 2/3/2021 by Dr. Russel Manriquez. Also been evaluated by neurology for toxic metabolic encephalopathy superimposed on alcohol use. Has been treated with vancomycin and Zosyn. Evaluated by orthopedic surgery for right wrist swelling. They recommend joint aspiration to rule out infection. ? Infectious diseases service was consulted to evaluate the pt, and recommend further investigative and therapeutic measures. Review and summary of old records:  ROS: Other systems reviewed Including eyes, ENT, respiratory, cardiovascular, GI, , dermatologic, neurologic, psych, hem/lymphatic, musculoskeletal and endocrine were negative other than what is mentioned above.    Unable to obtain; pt on vent  Patient Active Problem List    Diagnosis Date Noted    Foot abscess, left     Rhabdomyolysis 2021     Past Medical History:   Diagnosis Date    Alcohol abuse     Fall in elderly patient     Tobacco abuse       No past surgical history on file. History reviewed. No pertinent family history. Infectious disease related family history - not contibutory. SOCIAL HISTORY  Social History     Tobacco Use    Smoking status: Not on file   Substance Use Topics    Alcohol use: Not on file       Born:  [employer] Occupation:   No recent travel of significance.  No recent unusual exposures.  NO pets    ? ALLERGIES  No Known Allergies   MEDICATIONS  Reviewed and are per the chart/EMR. IMMUNIZATION HISTORY    There is no immunization history on file for this patient. ? Antibiotics:   Vancomycin  Zosyn  ?  -------------------------------------------------------------------------------------------------------------------    Vital Signs:  Vitals:    02/04/21 1114   BP: 134/74   Pulse: 102   Resp:    Temp: 98.5 °F (36.9 °C)   SpO2:          Exam:    VS: noted; wt 79.5 kg  Gen: alert and oriented X3, no distress  Skin: no stigmata of endocarditis  Wounds: left foot C/D/I  HEMT: AT/NC Oropharynx pink, moist, and without lesions or exudates; dentition in good state of repair  Eyes: PERRLA, EOMI, conjunctiva pink, sclera anicteric. Neck: Supple. Trachea midline. No LAD. Chest: no distress and CTA. Good air movement. Heart: RRR and no MRG. Abd: soft, non-distended, no tenderness, no hepatomegaly. Normoactive bowel sounds. Ext: left wrist swelling  Catheter Site: without erythema or tenderness  LDA: CVC:  Neuro: Mental status intact. CN 2-12 intact and no focal sensory or motor deficits    ? Diagnostic Studies: reviewed  ? ? I have examined this patient and available medical records on this date and have made the above observations, conclusions and recommendations.   Electronically signed by: Electronically signed by Fadi Murray MD on 2/4/2021 at 1:35 PM

## 2021-02-04 NOTE — PROGRESS NOTES
Occupational Therapy      Occupational Therapy Treatment Note  Name: Morgan Gallagher MRN: [id number] :   1945   Date:  2021   Admission Date: 2021 Room:  [address] [place] [address]   Restrictions/Precautions:    General Precautions, Fall Risk  Communication with other providers:  Nursing handoff, Co-tx w/ PTA  Subjective:  Patient states:  \"I'll try to get up\"  Pain:   Location, Type, Intensity (0/10 to 10/10):  10/10 pain in LUE and LLE foot  Objective:    Observation:  Pt received supine in bed, LUE w/ splint due to recently found triquetral fracture, LLE abscess wrapped up on bandage  Objective Measures:  WFL  Treatment, including education:  Self Care Training:   Cues were given for safety, sequence, UE/LE placement, visual cues, and balance. Activities performed today included UB/LB bathing/dressing, grooming, toileting    Therapeutic Activity Training:   Therapeutic activity training was instructed today. Cues were given for safety, sequence, UE/LE placement, awareness, and balance. Activities performed today included bed mobility training, sup-sit, sitting balance, sit to supine, scooting to HOB    Supine to sit maxA x 2, sitting EOB modA (See PTA notes for details), Sit to supine maxA x 2, scooting to HOB maxA x 2. UB dressing doffing/donning gown modA, LB dressing doffing/donning socks maxA, doffing soiled depends maxA. UB bathing washing abdomen, trunk, BUE arms modA due to deficits in LUE, LB bathing maxA washing george area/buttocks rolling L/R due to bowel movement, assistance to wash upper/lower legs. Grooming washing face/hands and brushing hair Christal due to decreased cognition. Pt supine in bed, bed alarm on, call light at side, nursing notified. Pt required increased time for all tasks due to decreased cognition/pain in L side.       Assessment / Impression:        Patient's tolerance of treatment:  Pt tolerated treatment fairly well   Adverse Reaction: increased pain in LUE/LLE  Significant change in status and impact:  LUE fracture  Barriers to improvement:  Decreased cognition, LUE pain, LLE abscess  Plan for Next Session:    Pt will perform functional task in sit reaching in all 3 planes to increase dynamic standing balance    Time in:  1404  Time out:  1457  Timed treatment minutes:  53 minutes  Total treatment time:  53 minutes  Electronically signed by:    Maite Chavarria OTR/L [id number]  3:09 PM,2/4/2021     Previously filed values:      Goals:  1. Pt will complete all aspects of bed mobility for EOB/OOB ADLs Joseph.   2. Pt will complete UB/LB bathing with Joseph and AE as needed. 3. Pt will complete all aspects of LB dressing with ModA and AE as needed. 4. Pt will complete all functional transfers to and from bed, chair, toilet, shower chair with CGA and AD. 5. Pt will ambulate HH distance to bathroom for toileting with CGA and AD. 6. Pt will complete all aspects of toileting task with Joseph. 7. Pt will complete oral hygiene/grooming routine in standing at sink with Joseph demo good dynamic standing balance for approx 8 minutes. 8. Pt will complete ther ex/ther act with focus on UB strengthening.

## 2021-02-04 NOTE — PROGRESS NOTES
Hospitalist Progress Note      Name:  Darshan Barksdale /Age/Sex: 1945  (77 y.o. male)   MRN & CSN:  [id number] & [id number] Admission Date/Time: 2021  2:05 PM   Location:  [address] [place], [place] [id number] PCP: No primary care provider on file. Hospital Day: 4    Assessment and Plan:   Darshan Barksdale is a 77 y.o.  male  who presents with Rhabdomyolysis    --Nontraumatic Rhadomyolysis   Due to Prolonged immobilization after a fall. No CISCO. CK decreasing with fluid resuscitation (2315->816->350). IVF discontinued. --Mechanical fall at home - PT/OT    --Left Triquetral fracture, likely from fall on outstretched arm. --Possible left wrist infection  Plan:   Ortho recommends nonoperative mgt with splint. Wrist aspiration by IR today. On Vanc and Zosyn. --Beta streptococcus bacteremia (1/4 bottles)  Possibly true infection (from foot abscess) vs contamination. Already on Zosyn. Will ask ID to see for further mgt. --Left foot abscess (present on admission)  Located around the Right hallux base. No evidence of osteomyelitis on Xray. S/p I&D on 2/3/21. On Vanc and Zosyn. Will F/u wound culture results and adjust antibiotic accordingly. General surgery input appreciated. --Alcohol use disorder. Serum alcohol not elevated. No withdrawal symptoms at this time. Plan: folate and thiamine. --Incidental Rt Adrenal nodule - repeat imaging in 1 year. PCP to f/u. --Abnormal LFTs - from rhabdomyloysis. Improving. --Leukocytosis (22K) - possibly due to abscess vs reactive. No fever or chills. Antibiotic mgt as above. --Constipation - CT abd/pel shows stools in rectal vault. Plan: Miralaxa PRN    Diet DIET GENERAL; Dental Soft   DVT Prophylaxis [] Lovenox, []  Heparin, [] SCDs, [] Ambulation   GI Prophylaxis [] PPI,  [] H2 Blocker,  [] Carafate,  [] Diet/Tube Feeds   Code Status Full Code   Disposition SNF recommended by PT/OT.     MDM [] Low, [x] Moderate,[]  High     History of Present Illness:   Patient seen and examined. No acute issues overnight. VSS. He c/o Left wrist pain from needle aspiration earlier today      Ten point ROS reviewed negative, unless as noted above    Objective:     No intake or output data in the 24 hours ending 02/04/21 1038   Vitals:   Vitals:    02/04/21 0819   BP:    Pulse:    Resp:    Temp:    SpO2: 94%     Physical Exam:   GEN Awake male, sitting upright in bed. RESP Breathing comfortably on RA. CARDIO/VASC S1/S2 auscultated. Regular rate without appreciable murmurs. No peripheral edema. GI Abdomen is soft without significant tenderness, masses, or guarding. Bowel sounds are normoactive. MSK Left wrist splinted. SKIN Normal coloration, warm, dry. NEURO  normal speech, no lateralizing weakness. PSYCH Awake, alert, oriented x 3.      Medications:   Medications:    vancomycin  1,000 mg Intravenous Q12H    piperacillin-tazobactam  3,375 mg Intravenous Q8H    sodium chloride flush  10 mL Intravenous 2 times per day    enoxaparin  40 mg Subcutaneous Daily    multivitamin  1 tablet Oral Daily    thiamine (VITAMIN B1) IVPB  100 mg Intravenous Daily      Infusions:     PRN Meds:     sodium chloride flush, 10 mL, PRN      promethazine, 12.5 mg, Q6H PRN    Or      ondansetron, 4 mg, Q6H PRN      polyethylene glycol, 17 g, Daily PRN      acetaminophen, 650 mg, Q6H PRN    Or      acetaminophen, 650 mg, Q6H PRN        CBC   Recent Labs     02/01/21  1240 02/02/21  0639   WBC 22.9* 20.3*   HGB 17.2 14.9   HCT 49.2 44.7    194      BMP   Recent Labs     02/01/21  1240 02/02/21  0639    138   K 4.2 3.8   CL 96* 104   CO2 20* 21   PHOS  --  2.5   BUN 38* 29*   CREATININE 0.9 0.7*       Radiology report reviewed     Electronically signed by Michelle Robles MD on 2/4/2021 at 10:38 AM

## 2021-02-04 NOTE — PROGRESS NOTES
Physical Therapy    Physical Therapy Treatment Note  Name: Sanjay Rae MRN: [id number] :   1945   Date:  2021   Admission Date: 2021 Room:  [address] [place] [address]   Restrictions/Precautions:        fall risk, splint on L UE treated as nwb  Communication with other providers:  Co tx /c OT  Subjective:  Patient states:  He has pain in L UE/LE  Pain:   Location, Type, Intensity (0/10 to 10/10): Does not give number, but says its \"highly painful\"  Objective:    Observation:  Pt supine in bed   Treatment, including education/measures:  Transfers  Supine to sit :maxA 1-2  Sit to supine :maxA 1-2  Rolling :modA  Multiple sets of bed mob performed /c rolling and supine<>Sit. Sitting balance training x ~5 min on EOB /c pt displaying L lat trunk lean and vc's and modA to regain proper positioning, unable to sit fully upright, loses balances occasionally. Pt states he feels some dizziness. Unable to attempt transfer on this date. Extended rest breaks given as needed /c Edu provided for proper sequencing and body mechanics during functional mobility. Assessment / Impression:       Patient's tolerance of treatment:  Fair   Adverse Reaction: no  Significant change in status and impact:  no  Barriers to improvement:  LLE pain, LUE splint, activity tolerance, balance and strength. Plan for Next Session:    Continue POC, focus on sitting EOB and upright for longer durations and progressing as tolerated  Time in:  1404  Time out:  1457  Timed treatment minutes:  53  Total treatment time:  48    Previously filed items:  Social/Functional History  Lives With: Alone  Type of Home: House  Home Layout: One level  Home Access: (2 steps to enter)  Bathroom Shower/Tub: Tub/Shower unit  Home Equipment: Rolling walker, 4 wheeled walker, Cane(Pt reports at baseline he ambulates with his RW in the community.  Within the house pt ambulates with a cane.)  Receives Help From: (Pt reports the  at his house checks in

## 2021-02-05 ENCOUNTER — ANESTHESIA (OUTPATIENT)
Dept: OPERATING ROOM | Age: 76
DRG: 500 | End: 2021-02-05
Payer: MEDICARE

## 2021-02-05 ENCOUNTER — ANESTHESIA EVENT (OUTPATIENT)
Dept: OPERATING ROOM | Age: 76
DRG: 500 | End: 2021-02-05
Payer: MEDICARE

## 2021-02-05 VITALS — SYSTOLIC BLOOD PRESSURE: 88 MMHG | DIASTOLIC BLOOD PRESSURE: 51 MMHG | OXYGEN SATURATION: 97 %

## 2021-02-05 PROBLEM — R78.81 BACTEREMIA DUE TO GROUP B STREPTOCOCCUS: Status: ACTIVE | Noted: 2021-02-05

## 2021-02-05 PROBLEM — B95.1 BACTEREMIA DUE TO GROUP B STREPTOCOCCUS: Status: ACTIVE | Noted: 2021-02-05

## 2021-02-05 LAB
BASOPHILS ABSOLUTE: 0 K/CU MM
BASOPHILS RELATIVE PERCENT: 0.3 % (ref 0–1)
DIFFERENTIAL TYPE: ABNORMAL
EOSINOPHILS ABSOLUTE: 0.1 K/CU MM
EOSINOPHILS RELATIVE PERCENT: 1 % (ref 0–3)
HCT VFR BLD CALC: 34.7 % (ref 42–52)
HEMOGLOBIN: 11.6 GM/DL (ref 13.5–18)
HIGH SENSITIVE C-REACTIVE PROTEIN: 125 MG/L
IMMATURE NEUTROPHIL %: 1.7 % (ref 0–0.43)
LYMPHOCYTES ABSOLUTE: 0.8 K/CU MM
LYMPHOCYTES RELATIVE PERCENT: 6.9 % (ref 24–44)
MCH RBC QN AUTO: 34.7 PG (ref 27–31)
MCHC RBC AUTO-ENTMCNC: 33.4 % (ref 32–36)
MCV RBC AUTO: 103.9 FL (ref 78–100)
MONOCYTES ABSOLUTE: 1 K/CU MM
MONOCYTES RELATIVE PERCENT: 7.8 % (ref 0–4)
NUCLEATED RBC %: 0 %
PDW BLD-RTO: 12.2 % (ref 11.7–14.9)
PLATELET # BLD: 181 K/CU MM (ref 140–440)
PMV BLD AUTO: 10.2 FL (ref 7.5–11.1)
PROCALCITONIN: 0.51
RBC # BLD: 3.34 M/CU MM (ref 4.6–6.2)
SEGMENTED NEUTROPHILS ABSOLUTE COUNT: 10 K/CU MM
SEGMENTED NEUTROPHILS RELATIVE PERCENT: 82.3 % (ref 36–66)
TOTAL IMMATURE NEUTOROPHIL: 0.2 K/CU MM
TOTAL NUCLEATED RBC: 0 K/CU MM
WBC # BLD: 12.1 K/CU MM (ref 4–10.5)

## 2021-02-05 PROCEDURE — 2709999900 HC NON-CHARGEABLE SUPPLY: Performed by: SURGERY

## 2021-02-05 PROCEDURE — 84145 PROCALCITONIN (PCT): CPT

## 2021-02-05 PROCEDURE — 2700000000 HC OXYGEN THERAPY PER DAY

## 2021-02-05 PROCEDURE — 86141 C-REACTIVE PROTEIN HS: CPT

## 2021-02-05 PROCEDURE — 6370000000 HC RX 637 (ALT 250 FOR IP): Performed by: STUDENT IN AN ORGANIZED HEALTH CARE EDUCATION/TRAINING PROGRAM

## 2021-02-05 PROCEDURE — 6360000002 HC RX W HCPCS: Performed by: SURGERY

## 2021-02-05 PROCEDURE — 87040 BLOOD CULTURE FOR BACTERIA: CPT

## 2021-02-05 PROCEDURE — 97530 THERAPEUTIC ACTIVITIES: CPT

## 2021-02-05 PROCEDURE — 1200000000 HC SEMI PRIVATE

## 2021-02-05 PROCEDURE — 2580000003 HC RX 258: Performed by: SURGERY

## 2021-02-05 PROCEDURE — 3700000000 HC ANESTHESIA ATTENDED CARE: Performed by: SURGERY

## 2021-02-05 PROCEDURE — 3700000001 HC ADD 15 MINUTES (ANESTHESIA): Performed by: SURGERY

## 2021-02-05 PROCEDURE — 87070 CULTURE OTHR SPECIMN AEROBIC: CPT

## 2021-02-05 PROCEDURE — 10061 I&D ABSCESS COMP/MULTIPLE: CPT | Performed by: SURGERY

## 2021-02-05 PROCEDURE — 97116 GAIT TRAINING THERAPY: CPT

## 2021-02-05 PROCEDURE — 94150 VITAL CAPACITY TEST: CPT

## 2021-02-05 PROCEDURE — 87077 CULTURE AEROBIC IDENTIFY: CPT

## 2021-02-05 PROCEDURE — 99233 SBSQ HOSP IP/OBS HIGH 50: CPT | Performed by: INTERNAL MEDICINE

## 2021-02-05 PROCEDURE — 87205 SMEAR GRAM STAIN: CPT

## 2021-02-05 PROCEDURE — 94761 N-INVAS EAR/PLS OXIMETRY MLT: CPT

## 2021-02-05 PROCEDURE — 6360000002 HC RX W HCPCS: Performed by: INTERNAL MEDICINE

## 2021-02-05 PROCEDURE — 87075 CULTR BACTERIA EXCEPT BLOOD: CPT

## 2021-02-05 PROCEDURE — 99024 POSTOP FOLLOW-UP VISIT: CPT | Performed by: SURGERY

## 2021-02-05 PROCEDURE — 6360000002 HC RX W HCPCS: Performed by: STUDENT IN AN ORGANIZED HEALTH CARE EDUCATION/TRAINING PROGRAM

## 2021-02-05 PROCEDURE — 2580000003 HC RX 258: Performed by: INTERNAL MEDICINE

## 2021-02-05 PROCEDURE — 0HBEXZZ EXCISION OF LEFT LOWER ARM SKIN, EXTERNAL APPROACH: ICD-10-PCS | Performed by: SURGERY

## 2021-02-05 PROCEDURE — 2500000003 HC RX 250 WO HCPCS: Performed by: NURSE ANESTHETIST, CERTIFIED REGISTERED

## 2021-02-05 PROCEDURE — 3600000013 HC SURGERY LEVEL 3 ADDTL 15MIN: Performed by: SURGERY

## 2021-02-05 PROCEDURE — 0J9R0ZZ DRAINAGE OF LEFT FOOT SUBCUTANEOUS TISSUE AND FASCIA, OPEN APPROACH: ICD-10-PCS | Performed by: SURGERY

## 2021-02-05 PROCEDURE — 6360000002 HC RX W HCPCS: Performed by: NURSE ANESTHETIST, CERTIFIED REGISTERED

## 2021-02-05 PROCEDURE — 2500000003 HC RX 250 WO HCPCS: Performed by: SURGERY

## 2021-02-05 PROCEDURE — 36415 COLL VENOUS BLD VENIPUNCTURE: CPT

## 2021-02-05 PROCEDURE — 94010 BREATHING CAPACITY TEST: CPT

## 2021-02-05 PROCEDURE — 85025 COMPLETE CBC W/AUTO DIFF WBC: CPT

## 2021-02-05 PROCEDURE — 2580000003 HC RX 258: Performed by: STUDENT IN AN ORGANIZED HEALTH CARE EDUCATION/TRAINING PROGRAM

## 2021-02-05 PROCEDURE — 3600000003 HC SURGERY LEVEL 3 BASE: Performed by: SURGERY

## 2021-02-05 PROCEDURE — 99232 SBSQ HOSP IP/OBS MODERATE 35: CPT | Performed by: ORTHOPAEDIC SURGERY

## 2021-02-05 RX ORDER — KETAMINE HYDROCHLORIDE 10 MG/ML
INJECTION, SOLUTION INTRAMUSCULAR; INTRAVENOUS PRN
Status: DISCONTINUED | OUTPATIENT
Start: 2021-02-05 | End: 2021-02-05 | Stop reason: SDUPTHER

## 2021-02-05 RX ORDER — LIDOCAINE HYDROCHLORIDE 10 MG/ML
INJECTION, SOLUTION EPIDURAL; INFILTRATION; INTRACAUDAL; PERINEURAL
Status: COMPLETED | OUTPATIENT
Start: 2021-02-05 | End: 2021-02-05

## 2021-02-05 RX ORDER — LIDOCAINE HYDROCHLORIDE 20 MG/ML
INJECTION, SOLUTION INTRAVENOUS PRN
Status: DISCONTINUED | OUTPATIENT
Start: 2021-02-05 | End: 2021-02-05 | Stop reason: SDUPTHER

## 2021-02-05 RX ORDER — MORPHINE SULFATE 4 MG/ML
4 INJECTION, SOLUTION INTRAMUSCULAR; INTRAVENOUS
Status: DISCONTINUED | OUTPATIENT
Start: 2021-02-05 | End: 2021-02-12 | Stop reason: HOSPADM

## 2021-02-05 RX ORDER — M-VIT,TX,IRON,MINS/CALC/FOLIC 27MG-0.4MG
1 TABLET ORAL DAILY
Status: DISCONTINUED | OUTPATIENT
Start: 2021-02-06 | End: 2021-02-12 | Stop reason: HOSPADM

## 2021-02-05 RX ORDER — FENTANYL CITRATE 50 UG/ML
INJECTION, SOLUTION INTRAMUSCULAR; INTRAVENOUS PRN
Status: DISCONTINUED | OUTPATIENT
Start: 2021-02-05 | End: 2021-02-05 | Stop reason: SDUPTHER

## 2021-02-05 RX ORDER — PROPOFOL 10 MG/ML
INJECTION, EMULSION INTRAVENOUS PRN
Status: DISCONTINUED | OUTPATIENT
Start: 2021-02-05 | End: 2021-02-05 | Stop reason: SDUPTHER

## 2021-02-05 RX ORDER — MORPHINE SULFATE 2 MG/ML
2 INJECTION, SOLUTION INTRAMUSCULAR; INTRAVENOUS
Status: DISCONTINUED | OUTPATIENT
Start: 2021-02-05 | End: 2021-02-12 | Stop reason: HOSPADM

## 2021-02-05 RX ADMIN — AMPICILLIN SODIUM 2000 MG: 2 INJECTION, POWDER, FOR SOLUTION INTRAVENOUS at 20:09

## 2021-02-05 RX ADMIN — AMPICILLIN SODIUM 2000 MG: 2 INJECTION, POWDER, FOR SOLUTION INTRAVENOUS at 08:38

## 2021-02-05 RX ADMIN — LIDOCAINE HYDROCHLORIDE 100 MG: 20 INJECTION, SOLUTION INTRAVENOUS at 14:02

## 2021-02-05 RX ADMIN — KETAMINE HYDROCHLORIDE 10 MG: 10 INJECTION INTRAMUSCULAR; INTRAVENOUS at 14:14

## 2021-02-05 RX ADMIN — THERA TABS 1 TABLET: TAB at 08:38

## 2021-02-05 RX ADMIN — KETAMINE HYDROCHLORIDE 10 MG: 10 INJECTION INTRAMUSCULAR; INTRAVENOUS at 14:10

## 2021-02-05 RX ADMIN — ENOXAPARIN SODIUM 40 MG: 40 INJECTION SUBCUTANEOUS at 08:38

## 2021-02-05 RX ADMIN — SODIUM CHLORIDE, PRESERVATIVE FREE 10 ML: 5 INJECTION INTRAVENOUS at 08:38

## 2021-02-05 RX ADMIN — SODIUM CHLORIDE, PRESERVATIVE FREE 10 ML: 5 INJECTION INTRAVENOUS at 20:09

## 2021-02-05 RX ADMIN — AMPICILLIN SODIUM 2000 MG: 2 INJECTION, POWDER, FOR SOLUTION INTRAVENOUS at 11:31

## 2021-02-05 RX ADMIN — MORPHINE SULFATE 2 MG: 2 INJECTION, SOLUTION INTRAMUSCULAR; INTRAVENOUS at 20:09

## 2021-02-05 RX ADMIN — FENTANYL CITRATE 25 MCG: 50 INJECTION INTRAMUSCULAR; INTRAVENOUS at 14:05

## 2021-02-05 RX ADMIN — AMPICILLIN SODIUM 2000 MG: 2 INJECTION, POWDER, FOR SOLUTION INTRAVENOUS at 15:06

## 2021-02-05 RX ADMIN — FENTANYL CITRATE 50 MCG: 50 INJECTION INTRAMUSCULAR; INTRAVENOUS at 14:02

## 2021-02-05 RX ADMIN — PROPOFOL 220 MG: 10 INJECTION, EMULSION INTRAVENOUS at 13:42

## 2021-02-05 RX ADMIN — FENTANYL CITRATE 25 MCG: 50 INJECTION INTRAMUSCULAR; INTRAVENOUS at 14:10

## 2021-02-05 RX ADMIN — KETAMINE HYDROCHLORIDE 20 MG: 10 INJECTION INTRAMUSCULAR; INTRAVENOUS at 14:07

## 2021-02-05 RX ADMIN — AMPICILLIN SODIUM 2000 MG: 2 INJECTION, POWDER, FOR SOLUTION INTRAVENOUS at 04:03

## 2021-02-05 RX ADMIN — THIAMINE HYDROCHLORIDE 100 MG: 100 INJECTION, SOLUTION INTRAMUSCULAR; INTRAVENOUS at 08:41

## 2021-02-05 RX ADMIN — AMPICILLIN SODIUM 2000 MG: 2 INJECTION, POWDER, FOR SOLUTION INTRAVENOUS at 00:29

## 2021-02-05 ASSESSMENT — PAIN SCALES - GENERAL
PAINLEVEL_OUTOF10: 0
PAINLEVEL_OUTOF10: 0

## 2021-02-05 ASSESSMENT — PULMONARY FUNCTION TESTS
PIF_VALUE: 1
PIF_VALUE: 0
PIF_VALUE: 1
PIF_VALUE: 0
PIF_VALUE: 0
PIF_VALUE: 1
PIF_VALUE: 0
PIF_VALUE: 0
PIF_VALUE: 1
PIF_VALUE: 0
PIF_VALUE: 1
PIF_VALUE: 0
PIF_VALUE: 0
PIF_VALUE: 1
PIF_VALUE: 0

## 2021-02-05 ASSESSMENT — ENCOUNTER SYMPTOMS
EYES NEGATIVE: 1
GASTROINTESTINAL NEGATIVE: 1
ALLERGIC/IMMUNOLOGIC NEGATIVE: 1
RESPIRATORY NEGATIVE: 1

## 2021-02-05 ASSESSMENT — PAIN DESCRIPTION - PAIN TYPE: TYPE: ACUTE PAIN;SURGICAL PAIN

## 2021-02-05 NOTE — ANESTHESIA PRE PROCEDURE
Department of Anesthesiology  Preprocedure Note       Name:  Jamarcus Hardy   Age:  77 y.o.  :  1945                                          MRN:  [id number]         Date:  2021      Surgeon: Elen Sarkar):  Capo Carrasco II, MD    Procedure: Procedure(s):  FOOT DEBRIDEMENT INCISION AND DRAINAGE    Medications prior to admission:   Prior to Admission medications    Not on File       Current medications:    Current Facility-Administered Medications   Medication Dose Route Frequency Provider Last Rate Last Admin    [START ON 2021] therapeutic multivitamin-minerals 1 tablet  1 tablet Oral Daily  Grant, DO        ampicillin (OMNIPEN) 2,000 mg in sodium chloride 0.9 % 100 mL IVPB  2,000 mg Intravenous 6 times per day Cristhian Coker MD   Stopped at 21 1232    sodium chloride flush 0.9 % injection 10 mL  10 mL Intravenous 2 times per day  Grant, DO   10 mL at 21 0838    sodium chloride flush 0.9 % injection 10 mL  10 mL Intravenous PRN  Grant, DO        enoxaparin (LOVENOX) injection 40 mg  40 mg Subcutaneous Daily  Grant, DO   40 mg at 21 0838    promethazine (PHENERGAN) tablet 12.5 mg  12.5 mg Oral Q6H PRN  Grant, DO        Or    ondansetron (ZOFRAN) injection 4 mg  4 mg Intravenous Q6H PRN  Grant, DO        polyethylene glycol (GLYCOLAX) packet 17 g  17 g Oral Daily PRN  Grant, DO        acetaminophen (TYLENOL) tablet 650 mg  650 mg Oral Q6H PRN  Grant, DO   650 mg at 21 2100    Or    acetaminophen (TYLENOL) suppository 650 mg  650 mg Rectal Q6H PRN  Grant, DO        thiamine (B-1) 100 mg in sodium chloride 0.9 % 50 mL IVPB  100 mg Intravenous Daily  Grant, DO   Stopped at 21 1001       Allergies:  No Known Allergies    Problem List:    Patient Active Problem List   Diagnosis Code    Rhabdomyolysis M62.82    Foot abscess, left L02.612    Bacteremia due to group B Streptococcus INR 1.33 02/01/2021       HCG (If Applicable): No results found for: PREGTESTUR, PREGSERUM, HCG, HCGQUANT     ABGs: No results found for: PHART, PO2ART, TOL2YPV, YFA9HRF, BEART, D3DSSAFZ     Type & Screen (If Applicable):  No results found for: LABABO, LABRH    Drug/Infectious Status (If Applicable):  No results found for: HIV, HEPCAB    COVID-19 Screening (If Applicable):   Lab Results   Component Value Date    COVID19 NOT DETECTED 02/01/2021         Anesthesia Evaluation    Airway: Mallampati: II  TM distance: >3 FB     Mouth opening: > = 3 FB Dental:          Pulmonary:normal exam                               Cardiovascular:                      Neuro/Psych:   (+) neuromuscular disease:, psychiatric history:            GI/Hepatic/Renal:             Endo/Other:                     Abdominal:           Vascular:                                        Anesthesia Plan      MAC     ASA 3       Induction: intravenous. MIPS: Postoperative opioids intended. Anesthetic plan and risks discussed with patient. Plan discussed with CRNA.     Attending anesthesiologist reviewed and agrees with Pre Eval content            GENESIS Medellin - CRNA   2/5/2021

## 2021-02-05 NOTE — OP NOTE
Incision and Drainage (I&D) Procedure Note    Patient ID:  Evie Alcala  [id number]  58 y.o.  1945    Indications: Abscess of left foot, failed bedside I&D, pt unable to tolerate    Pre-operative Diagnosis: Abscess of left foot    Post-operative Diagnosis: Same    Procedure:  I&D of left foot    Surgeon: Lashaun Singh MD FACS    Assistant: Conception Good, CNP The skilled assistance of the CNP was necessary for the successful completion of this case. She was essential for the proper positioning, manipulation of instruments, proper exposure, manipulation of tissue, and wound closure. Findings:  Consistent with post op diagnosis     Estimated Blood Loss:  15 mL     Anesthesia: MAC    IVF: 300 mL    Specimen(s): left foot abscess culture         Complications:  None apparent; patient tolerated the procedure well. Condition: stable    Procedure Details: The patient was positioned appropriately and the skin over the abscess site was prepped with betadine and draped in a sterile fashion. Local anesthesia was obtained by infiltration using 12 mL of 1% Lidocaine without epinephrine. A cruciate incision was then made over the greatest area of fluctuance and approximately 4 cc of purulent material was expressed. Loculations were broken up using forceps but no more material was returned. Wound was cultured with a swab. The drainage cavity was then packed with sterile gauze. The patient tolerated the procedure well. At the end of the case, the counts were correct.        Lashaun Singh MD

## 2021-02-05 NOTE — ANESTHESIA POSTPROCEDURE EVALUATION
Department of Anesthesiology  Postprocedure Note    Patient: Alvaro Burks  MRN: [id number]  YOB: 1945  Date of evaluation: 2/5/2021  Time:  2:39 PM     Procedure Summary     Date: 02/05/21 Room / Location: [address]    Anesthesia Start: 1421 Anesthesia Stop: 9588    Procedure: FOOT DEBRIDEMENT INCISION AND DRAINAGE (Left Foot) Diagnosis: (.)    Surgeons: Ramona Tucker MD Responsible Provider: Kezia Ramirez MD    Anesthesia Type: MAC ASA Status: 3          Anesthesia Type: MAC    Billy Phase I:      Billy Phase II:      Last vitals: Reviewed and per EMR flowsheets.        Anesthesia Post Evaluation    Patient location during evaluation: bedside  Patient participation: complete - patient participated  Level of consciousness: awake and alert  Pain score: 0  Airway patency: patent  Nausea & Vomiting: no nausea  Complications: no  Cardiovascular status: hemodynamically stable  Respiratory status: acceptable  Hydration status: euvolemic

## 2021-02-05 NOTE — PROGRESS NOTES
General Surgery-Dr. Bryant Leyla Day: 5    Chief Complaint on Admission: left foot abscess      Subjective:     Still with foot pain. Denies F/C. Went for attempted wrist aspiration yesterday. ROS:  Review of Systems   Constitutional: Positive for fatigue. HENT: Negative. Eyes: Negative. Respiratory: Negative. Cardiovascular: Negative. Gastrointestinal: Negative. Endocrine: Negative. Genitourinary: Negative. Musculoskeletal: Positive for joint swelling and myalgias. Skin: Positive for wound. Allergic/Immunologic: Negative. Neurological: Negative. Hematological: Negative. Psychiatric/Behavioral: Negative. Allergies  Patient has no known allergies. Diagnosis Date    Alcohol abuse     Fall in elderly patient     Tobacco abuse        Objective:     Vitals:    21 0815   BP: (!) 145/87   Pulse: 91   Resp: 16   Temp: 98.9 °F (37.2 °C)   SpO2: 94%       TEMPERATURE:  Current -Temp: 98.9 °F (37.2 °C); Max - Temp  Av.4 °F (36.9 °C)  Min: 97.8 °F (36.6 °C)  Max: 98.9 °F (37.2 °C)    No intake/output data recorded. No intake/output data recorded. Physical Exam:  Physical Exam  Vitals signs reviewed. Constitutional:       General: He is not in acute distress. Appearance: Normal appearance. He is not ill-appearing, toxic-appearing or diaphoretic. HENT:      Head: Normocephalic and atraumatic. Right Ear: External ear normal.      Left Ear: External ear normal.   Eyes:      General:         Right eye: No discharge. Left eye: No discharge. Extraocular Movements: Extraocular movements intact. Cardiovascular:      Rate and Rhythm: Normal rate. Pulmonary:      Effort: No respiratory distress. Abdominal:      Palpations: Abdomen is soft. Musculoskeletal:      Comments: Left wrist in splint   Skin:     Comments: Left foot with erythema, warmth, tenderness and fluctuance. Neurological:      Mental Status: He is alert. Scheduled Meds:   [START ON 2/6/2021] therapeutic multivitamin-minerals  1 tablet Oral Daily    ampicillin IV  2,000 mg Intravenous 6 times per day    sodium chloride flush  10 mL Intravenous 2 times per day    enoxaparin  40 mg Subcutaneous Daily    thiamine (VITAMIN B1) IVPB  100 mg Intravenous Daily     ContinuousInfusions:  PRN Meds:sodium chloride flush, promethazine **OR** ondansetron, polyethylene glycol, acetaminophen **OR** acetaminophen      Labs/Imaging Results:   Lab Results   Component Value Date    WBC 12.1 (H) 02/05/2021    HGB 11.6 (L) 02/05/2021    HCT 34.7 (L) 02/05/2021    .9 (H) 02/05/2021     02/05/2021     Lab Results   Component Value Date     (L) 02/04/2021    K 3.3 (L) 02/04/2021    CL 98 (L) 02/04/2021    CO2 26 02/04/2021    BUN 10 02/04/2021    CREATININE 0.6 (L) 02/04/2021    GLUCOSE 152 (H) 02/04/2021    CALCIUM 8.1 (L) 02/04/2021    PROT 5.1 (L) 02/02/2021    LABALBU 2.7 (L) 02/02/2021    BILITOT 1.0 02/02/2021    ALKPHOS 70 02/02/2021    AST 49 (H) 02/02/2021    ALT 32 02/02/2021    LABGLOM >60 02/04/2021    GFRAA >60 02/04/2021       Assessment:       76 y/o M with left foot abscess, bacteremia     Plan:     -D/w pt need for I&D in OR since he did not tolerate full I&D at bedside. Pt was initially disagreeable; however, after d/w with pt, pt's nurse, Dr. Francisco J Phelan, and myself, he was agreeable to proceed. -COVID negative on 2/1/2021.    -Pt already on Abx per ID. Appreciated.    -Case added on to OR schedule.        Electronically signed by Cortez Carlin II, MD on 2/5/2021 at 12:40 PM

## 2021-02-05 NOTE — PROGRESS NOTES
Hospitalist Progress Note      Name:  Pamela Shaver /Age/Sex: 1945  (77 y.o. male)   MRN & CSN:  [id number] & [id number] Admission Date/Time: 2021  2:05 PM   Location:  [address] [place] [address] PCP: No primary care provider on file. Hospital Day:       Assessment and Plan:      - per infectious disease patient will need at least 2 weeks of intravenous antibiotics, from negative blood culture. Currently on IV ampicillin 2 grams 6 times per day. Discussed with case management re: checking with Emily Hodgkin [facility] about this  -s/p I&D left foot wound  in [place] [address] Shania Yee is a 77 y.o.  male  who presents with Rhabdomyolysis     --Nontraumatic Rhadomyolysis   Due to Prolonged immobilization after a fall. No CISCO. CK decreasing with fluid resuscitation (2315->816->350). IVF discontinued. --Mechanical fall at home - PT/OT     --Left Triquetral fracture (wrist bone), likely from fall on outstretched arm. --Possible left wrist infection - per ortho note  wrist aspiration negative so far, await final culture  Plan:   Ortho recommends nonoperative mgt with splint. Wrist aspiration by IR done , minimal fluid recovered in IR  On IV Ampicillin     --Beta Streptococcus Group G bacteremia with sepsis(1/4 bottles)  Per ID left foot involved, and left wrist possibly involved  Wound on left dorsal foot noted - growing same organism as in blood  Need to arrange to IV abx per ID  Case management following        --Left foot abscess (present on admission)  Located around the Right hallux base. No evidence of osteomyelitis on Xray. S/p I&D on 2/3/21 by Dr. Russel Manriquez at the bedside  S/p I&D on 21 in OR - 4 cc purulent material expressed - follow up culture  On IV ampicillin  General surgery input appreciated. --Alcohol use disorder. Serum alcohol not elevated. No withdrawal symptoms at this time. Plan: folate and thiamine.       --Incidental Rt Adrenal nodule - repeat imaging in 1 year. PCP to f/u. --Abnormal LFTs - from rhabdomyloysis. Improving. --Leukocytosis (22K) - possibly due to abscess vs reactive. No fever or chills. Antibiotic mgt as above. --Constipation - CT abd/pel shows stools in rectal vault. Plan: Miralaxa PRN    DVT prophylaxis-Lovenox      Subjective:     No dyspnea at rest.  He said he was able to get an important phone call. Objective:   No intake or output data in the 24 hours ending 02/05/21 1043   Vitals:   Vitals:    02/05/21 0815   BP: (!) 145/87   Pulse: 91   Resp: 16   Temp: 98.9 °F (37.2 °C)   SpO2: 94%     Physical Exam:      Physical Exam  Pulmonary:      Effort: Pulmonary effort is normal.   Neurological:      Mental Status: He is alert. Cranial Nerves: No cranial nerve deficit.            Medications:   Medications:    ampicillin IV  2,000 mg Intravenous 6 times per day    sodium chloride flush  10 mL Intravenous 2 times per day    enoxaparin  40 mg Subcutaneous Daily    multivitamin  1 tablet Oral Daily    thiamine (VITAMIN B1) IVPB  100 mg Intravenous Daily      Infusions:   PRN Meds:     sodium chloride flush, 10 mL, PRN      promethazine, 12.5 mg, Q6H PRN    Or      ondansetron, 4 mg, Q6H PRN      polyethylene glycol, 17 g, Daily PRN      acetaminophen, 650 mg, Q6H PRN    Or      acetaminophen, 650 mg, Q6H PRN          Electronically signed by Dacia Ashley MD on 2/5/2021 at 10:43 AM

## 2021-02-05 NOTE — PROGRESS NOTES
Patient instructed and educated on [employer]. Patient able to do 1000 ml. Vital capacity  Patient's goal is 3000ml.  Electronically signed by Evan Smith RCP on 2/5/2021 at 4:33 PM

## 2021-02-05 NOTE — PROGRESS NOTES
Infectious Disease Progress Note  2021   Patient Name: Joan Mccall : 1945       Reason for visit: F/u sepsis secondary to group G streptococcus bacteremia, left foot abscess. ? History:? Interval history noted. Status post left foot I&D on 2/3/2021. Aspiration of left wrist  1 mL of blood  Continues to have pain in his left wrist and left hallux  Physical Exam:  Vital Signs: BP (!) 145/87   Pulse 91   Temp 98.9 °F (37.2 °C) (Axillary)   Resp 16   Ht 5' 10\" (1.778 m)   Wt 175 lb 4.8 oz (79.5 kg)   SpO2 94%   BMI 25.15 kg/m²     Gen: alert and oriented X3, no distress  Skin: no stigmata of endocarditis  Wounds: left foot C/D/I  HEMT: AT/NC Oropharynx pink, moist, and without lesions or exudates; dentition in good state of repair  Eyes: PERRLA, EOMI, conjunctiva pink, sclera anicteric. Neck: Supple. Trachea midline. No LAD. Chest: no distress and CTA. Good air movement. Heart: RRR and no MRG. Abd: soft, non-distended, no tenderness, no hepatomegaly. Normoactive bowel sounds. Ext: left wrist swelling  Catheter Site: without erythema or tenderness  LDA: CVC:  Neuro: Mental status intact. CN 2-12 intact and no focal sensory or motor deficits       Radiologic / Imaging / TESTING  THREE XRAY VIEWS OF THE LEFT FOOT     2021 2:41 pm     COMPARISON:   None. 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 There is mild soft tissue   swelling at the level of the 1st MTP joint as well. Deformity of the 5th   proximal phalanx likely related to prior trauma. Mild osteopenia. No acute   fracture or dislocation. Degenerative changes in the midfoot.   Soft tissue   swelling along the dorsum of the foot is also noted. Vascular   calcifications. Calcaneal spur along the plantar aspect. 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 Degenerative changes are also noted in the midfoot. Otherwise no acute osseous abnormality.         Labs:    Recent Results (from the past 24 hour(s))   Basic Metabolic Panel w/ Reflex to MG    Collection Time: 02/04/21  1:14 PM   Result Value Ref Range    Sodium 132 (L) 135 - 145 MMOL/L    Potassium 3.3 (L) 3.5 - 5.1 MMOL/L    Chloride 98 (L) 99 - 110 mMol/L    CO2 26 21 - 32 MMOL/L    Anion Gap 8 4 - 16    BUN 10 6 - 23 MG/DL    CREATININE 0.6 (L) 0.9 - 1.3 MG/DL    Glucose 152 (H) 70 - 99 MG/DL    Calcium 8.1 (L) 8.3 - 10.6 MG/DL    GFR Non-African American >60 >60 mL/min/1.73m2    GFR African American >60 >60 mL/min/1.73m2   CBC auto differential    Collection Time: 02/04/21  1:14 PM   Result Value Ref Range    WBC 15.4 (H) 4.0 - 10.5 K/CU MM    RBC 4.24 (L) 4.6 - 6.2 M/CU MM    Hemoglobin 14.5 13.5 - 18.0 GM/DL    Hematocrit 44.2 42 - 52 %    .2 (H) 78 - 100 FL    MCH 34.2 (H) 27 - 31 PG    MCHC 32.8 32.0 - 36.0 %    RDW 12.4 11.7 - 14.9 %    Platelets 573 800 - 481 K/CU MM    MPV 10.2 7.5 - 11.1 FL    Differential Type AUTOMATED DIFFERENTIAL     Segs Relative 84.9 (H) 36 - 66 %    Lymphocytes % 5.5 (L) 24 - 44 %    Monocytes % 7.5 (H) 0 - 4 %    Eosinophils % 0.4 0 - 3 %    Basophils % 0.3 0 - 1 %    Segs Absolute 13.1 K/CU MM    Lymphocytes Absolute 0.8 K/CU MM    Monocytes Absolute 1.2 K/CU MM    Eosinophils Absolute 0.1 K/CU MM    Basophils Absolute 0.1 K/CU MM    Nucleated RBC % 0.0 %    Total Nucleated RBC 0.0 K/CU MM    Total Immature Neutrophil 0.22 K/CU MM    Immature Neutrophil % 1.4 (H) 0 - 0.43 %   Magnesium    Collection Time: 02/04/21  1:14 PM   Result Value Ref Range    Magnesium 1.8 1.8 - 2.4 mg/dl   CBC auto differential    Collection Time: 02/05/21  5:50 AM   Result Value Ref Range WBC 12.1 (H) 4.0 - 10.5 K/CU MM    RBC 3.34 (L) 4.6 - 6.2 M/CU MM    Hemoglobin 11.6 (L) 13.5 - 18.0 GM/DL    Hematocrit 34.7 (L) 42 - 52 %    .9 (H) 78 - 100 FL    MCH 34.7 (H) 27 - 31 PG    MCHC 33.4 32.0 - 36.0 %    RDW 12.2 11.7 - 14.9 %    Platelets 643 111 - 954 K/CU MM    MPV 10.2 7.5 - 11.1 FL    Differential Type AUTOMATED DIFFERENTIAL     Segs Relative 82.3 (H) 36 - 66 %    Lymphocytes % 6.9 (L) 24 - 44 %    Monocytes % 7.8 (H) 0 - 4 %    Eosinophils % 1.0 0 - 3 %    Basophils % 0.3 0 - 1 %    Segs Absolute 10.0 K/CU MM    Lymphocytes Absolute 0.8 K/CU MM    Monocytes Absolute 1.0 K/CU MM    Eosinophils Absolute 0.1 K/CU MM    Basophils Absolute 0.0 K/CU MM    Nucleated RBC % 0.0 %    Total Nucleated RBC 0.0 K/CU MM    Total Immature Neutrophil 0.20 K/CU MM    Immature Neutrophil % 1.7 (H) 0 - 0.43 %   C-Reactive Protein    Collection Time: 02/05/21  5:50 AM   Result Value Ref Range    CRP, High Sensitivity 125.0 mg/L   Procalcitonin    Collection Time: 02/05/21  5:50 AM   Result Value Ref Range    Procalcitonin 0.512      CULTURE results: Invalid input(s): BLOOD CULTURE,  URINE CULTURE, SURGICAL CULTURE    Diagnosis:  Patient Active Problem List   Diagnosis    Rhabdomyolysis    Foot abscess, left    Bacteremia due to group B Streptococcus       Active Problems  Principal Problem:    Rhabdomyolysis  Active Problems:    Foot abscess, left    Bacteremia due to group B Streptococcus  Resolved Problems:    * No resolved hospital problems. *      Impression and plan   Summary and rationale: Patient is a 77 y.o.  male admitted after a fall at home, was on the floor possibly for 2 days. Had nontraumatic rhabdomyolysis. And the chronic left foot ulcer catheter had an abscess. Chronic left wrist pain, joint aspiration did not yield appreciable fluid. Diagnosed with Group G Streptococcus bacteremia.     Clinical status: Afebrile, leukocytosis on a downward trend, left hallux remains swollen. At the bedside with Dr. Nathalia Hein. Patient has finally agreed to have a repeat I&D. His concerns were addressed.  Therapeutic: Ampicillin 2/4-, will need at least 2 weeks of intravenous antibiotics, from negative blood culture.  Completed antibiotics Zosyn 2/3-4, vancomycin 2/3-4   Diagnostic: Blood culture ordered on 2/4, yet to be collected.    F/u:   Other:        Electronically signed by: Electronically signed by Liza Chris MD on 2/5/2021 at 10:50 AM

## 2021-02-05 NOTE — PLAN OF CARE
maintain appropriate glucose levels will improve  Description: Ability to maintain appropriate glucose levels will improve  Outcome: Ongoing     Problem: Falls - Risk of:  Goal: Will remain free from falls  Description: Will remain free from falls  Outcome: Ongoing  Goal: Absence of physical injury  Description: Absence of physical injury  Outcome: Ongoing     Problem: Skin Integrity:  Goal: Will show no infection signs and symptoms  Description: Will show no infection signs and symptoms  Outcome: Ongoing  Goal: Absence of new skin breakdown  Description: Absence of new skin breakdown  Outcome: Ongoing

## 2021-02-05 NOTE — PROGRESS NOTES
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 No formal WB status in the chart on LUE and therefore NWB was utilized this date.    Communication with other providers:    Subjective:  Patient states:  \"I want to sit and drink my pepsi\"   Pain:   Location, Type, Intensity (0/10 to 10/10): 4/10 left wrist   Objective:    Observation:    Supine in bed. Left wrist splinted, left foot gauze wrapped. Cooperative to work with therapy   Treatment, including education/measures:  Supine to sit: maxA for bilat LEs and trunk management. Cues for sequencing and facilitated right UE to bed rail to assist self   Sit to stand: modA from EOB and toilet for lift and fwd weight shift assist over [employer]LE[employer]. Inc VCs and assist with RUE/LUE set up for most effective use. Stand to sit: modA to toilet and recliner for eccentric control. Cues for RUE reaching back to seat surface/taking LUE off platform piece of walker. Step pivot: Christal with platform RW (2x). Steadying assist needed along with inc assistance maneuvering walker on the turns. Constant cues for sequencing   Ambulation: 10ft x 2 with platform RW Christal, slight left lateral lean, slower pace, dec bilat step length. Postural cues as well as walker positioning to improve overall stability and functional use of AD. Standing balance: CGA for safety static at platform RW. Therapist needing to assist with george care and LB dressing   Sitting balance: Christal progressing to SBA at EOB with single UE support   Educated pt on POc, role of PT, safe DME use.  Cues for sequencing to inc safety and indep with

## 2021-02-06 PROBLEM — A41.9 SEPSIS (HCC): Status: ACTIVE | Noted: 2021-02-06

## 2021-02-06 LAB
ANION GAP SERPL CALCULATED.3IONS-SCNC: 6 MMOL/L (ref 4–16)
BASOPHILS ABSOLUTE: 0 K/CU MM
BASOPHILS RELATIVE PERCENT: 0.2 % (ref 0–1)
BUN BLDV-MCNC: 10 MG/DL (ref 6–23)
CALCIUM SERPL-MCNC: 8.1 MG/DL (ref 8.3–10.6)
CHLORIDE BLD-SCNC: 94 MMOL/L (ref 99–110)
CO2: 29 MMOL/L (ref 21–32)
CREAT SERPL-MCNC: 0.6 MG/DL (ref 0.9–1.3)
CULTURE: NORMAL
DIFFERENTIAL TYPE: ABNORMAL
EOSINOPHILS ABSOLUTE: 0.1 K/CU MM
EOSINOPHILS RELATIVE PERCENT: 1 % (ref 0–3)
GFR AFRICAN AMERICAN: >60 ML/MIN/1.73M2
GFR NON-AFRICAN AMERICAN: >60 ML/MIN/1.73M2
GLUCOSE BLD-MCNC: 143 MG/DL (ref 70–99)
HCT VFR BLD CALC: 42.4 % (ref 42–52)
HEMOGLOBIN: 14 GM/DL (ref 13.5–18)
HIGH SENSITIVE C-REACTIVE PROTEIN: 169.9 MG/L
IMMATURE NEUTROPHIL %: 1.6 % (ref 0–0.43)
LYMPHOCYTES ABSOLUTE: 1.1 K/CU MM
LYMPHOCYTES RELATIVE PERCENT: 8.3 % (ref 24–44)
Lab: NORMAL
MCH RBC QN AUTO: 34.7 PG (ref 27–31)
MCHC RBC AUTO-ENTMCNC: 33 % (ref 32–36)
MCV RBC AUTO: 105 FL (ref 78–100)
MONOCYTES ABSOLUTE: 0.9 K/CU MM
MONOCYTES RELATIVE PERCENT: 6.6 % (ref 0–4)
NUCLEATED RBC %: 0 %
PDW BLD-RTO: 12.3 % (ref 11.7–14.9)
PLATELET # BLD: 241 K/CU MM (ref 140–440)
PMV BLD AUTO: 10.3 FL (ref 7.5–11.1)
POTASSIUM SERPL-SCNC: 3.3 MMOL/L (ref 3.5–5.1)
RBC # BLD: 4.04 M/CU MM (ref 4.6–6.2)
SEGMENTED NEUTROPHILS ABSOLUTE COUNT: 11.1 K/CU MM
SEGMENTED NEUTROPHILS RELATIVE PERCENT: 82.3 % (ref 36–66)
SODIUM BLD-SCNC: 129 MMOL/L (ref 135–145)
SPECIMEN: NORMAL
TOTAL IMMATURE NEUTOROPHIL: 0.22 K/CU MM
TOTAL NUCLEATED RBC: 0 K/CU MM
VITAMIN B1, PLASMA: 238 NMOL/L (ref 70–180)
WBC # BLD: 13.5 K/CU MM (ref 4–10.5)

## 2021-02-06 PROCEDURE — 6360000002 HC RX W HCPCS: Performed by: SURGERY

## 2021-02-06 PROCEDURE — 6370000000 HC RX 637 (ALT 250 FOR IP): Performed by: INTERNAL MEDICINE

## 2021-02-06 PROCEDURE — 87040 BLOOD CULTURE FOR BACTERIA: CPT

## 2021-02-06 PROCEDURE — 80048 BASIC METABOLIC PNL TOTAL CA: CPT

## 2021-02-06 PROCEDURE — 36415 COLL VENOUS BLD VENIPUNCTURE: CPT

## 2021-02-06 PROCEDURE — 1200000000 HC SEMI PRIVATE

## 2021-02-06 PROCEDURE — 6370000000 HC RX 637 (ALT 250 FOR IP): Performed by: SURGERY

## 2021-02-06 PROCEDURE — 2580000003 HC RX 258: Performed by: SURGERY

## 2021-02-06 PROCEDURE — 86141 C-REACTIVE PROTEIN HS: CPT

## 2021-02-06 PROCEDURE — 85025 COMPLETE CBC W/AUTO DIFF WBC: CPT

## 2021-02-06 PROCEDURE — 99233 SBSQ HOSP IP/OBS HIGH 50: CPT | Performed by: INTERNAL MEDICINE

## 2021-02-06 PROCEDURE — 99024 POSTOP FOLLOW-UP VISIT: CPT | Performed by: SURGERY

## 2021-02-06 RX ORDER — FOLIC ACID 1 MG/1
1 TABLET ORAL DAILY
Status: DISCONTINUED | OUTPATIENT
Start: 2021-02-06 | End: 2021-02-12 | Stop reason: HOSPADM

## 2021-02-06 RX ORDER — POTASSIUM CHLORIDE 20 MEQ/1
20 TABLET, EXTENDED RELEASE ORAL
Status: COMPLETED | OUTPATIENT
Start: 2021-02-06 | End: 2021-02-08

## 2021-02-06 RX ADMIN — POLYETHYLENE GLYCOL (3350) 17 G: 17 POWDER, FOR SOLUTION ORAL at 08:29

## 2021-02-06 RX ADMIN — MULTIPLE VITAMINS W/ MINERALS TAB 1 TABLET: TAB at 08:22

## 2021-02-06 RX ADMIN — AMPICILLIN SODIUM 2000 MG: 2 INJECTION, POWDER, FOR SOLUTION INTRAVENOUS at 00:25

## 2021-02-06 RX ADMIN — AMPICILLIN SODIUM 2000 MG: 2 INJECTION, POWDER, FOR SOLUTION INTRAVENOUS at 12:50

## 2021-02-06 RX ADMIN — MORPHINE SULFATE 4 MG: 4 INJECTION, SOLUTION INTRAMUSCULAR; INTRAVENOUS at 20:27

## 2021-02-06 RX ADMIN — SODIUM CHLORIDE, PRESERVATIVE FREE 10 ML: 5 INJECTION INTRAVENOUS at 08:23

## 2021-02-06 RX ADMIN — SODIUM CHLORIDE, PRESERVATIVE FREE 10 ML: 5 INJECTION INTRAVENOUS at 20:29

## 2021-02-06 RX ADMIN — MORPHINE SULFATE 4 MG: 4 INJECTION, SOLUTION INTRAMUSCULAR; INTRAVENOUS at 14:23

## 2021-02-06 RX ADMIN — AMPICILLIN SODIUM 2000 MG: 2 INJECTION, POWDER, FOR SOLUTION INTRAVENOUS at 03:43

## 2021-02-06 RX ADMIN — ENOXAPARIN SODIUM 40 MG: 40 INJECTION SUBCUTANEOUS at 08:22

## 2021-02-06 RX ADMIN — FOLIC ACID 1 MG: 1 TABLET ORAL at 08:22

## 2021-02-06 RX ADMIN — AMPICILLIN SODIUM 2000 MG: 2 INJECTION, POWDER, FOR SOLUTION INTRAVENOUS at 08:22

## 2021-02-06 RX ADMIN — POTASSIUM CHLORIDE 20 MEQ: 1500 TABLET, EXTENDED RELEASE ORAL at 16:59

## 2021-02-06 RX ADMIN — MORPHINE SULFATE 2 MG: 2 INJECTION, SOLUTION INTRAMUSCULAR; INTRAVENOUS at 02:50

## 2021-02-06 RX ADMIN — THIAMINE HYDROCHLORIDE 100 MG: 100 INJECTION, SOLUTION INTRAMUSCULAR; INTRAVENOUS at 08:22

## 2021-02-06 RX ADMIN — MORPHINE SULFATE 2 MG: 2 INJECTION, SOLUTION INTRAMUSCULAR; INTRAVENOUS at 08:30

## 2021-02-06 RX ADMIN — AMPICILLIN SODIUM 2000 MG: 2 INJECTION, POWDER, FOR SOLUTION INTRAVENOUS at 16:59

## 2021-02-06 RX ADMIN — AMPICILLIN SODIUM 2000 MG: 2 INJECTION, POWDER, FOR SOLUTION INTRAVENOUS at 20:28

## 2021-02-06 ASSESSMENT — PAIN DESCRIPTION - LOCATION: LOCATION: FOOT;WRIST

## 2021-02-06 ASSESSMENT — PAIN SCALES - GENERAL
PAINLEVEL_OUTOF10: 8
PAINLEVEL_OUTOF10: 8

## 2021-02-06 ASSESSMENT — PAIN DESCRIPTION - PAIN TYPE: TYPE: ACUTE PAIN;SURGICAL PAIN

## 2021-02-06 NOTE — PROGRESS NOTES
Infectious Disease Progress Note  2021   Patient Name: Awais Perez : 1945       Reason for visit: F/u sepsis secondary to group G streptococcus bacteremia, left foot abscess. ? History:? Interval history noted. Status post left foot I&D on 2/3/2021. Aspiration of left wrist  1 mL of blood  Continues to have pain in his left wrist and left hallux  Physical Exam:  Vital Signs: /78   Pulse 99   Temp 97.5 °F (36.4 °C) (Oral)   Resp 18   Ht 5' 10\" (1.778 m)   Wt 171 lb (77.6 kg)   SpO2 97%   BMI 24.54 kg/m²     Gen: alert and oriented X3, no distress  Skin: no stigmata of endocarditis  Wounds: left foot C/D/I  HEMT: AT/NC Oropharynx pink, moist, and without lesions or exudates; dentition in good state of repair  Eyes: PERRLA, EOMI, conjunctiva pink, sclera anicteric. Neck: Supple. Trachea midline. No LAD. Chest: no distress and CTA. Good air movement. Heart: RRR and no MRG. Abd: soft, non-distended, no tenderness, no hepatomegaly. Normoactive bowel sounds. Ext: left wrist swelling  Catheter Site: without erythema or tenderness  LDA: CVC:  Neuro: Mental status intact. CN 2-12 intact and no focal sensory or motor deficits       Radiologic / Imaging / TESTING  THREE XRAY VIEWS OF THE LEFT FOOT     2021 2:41 pm     COMPARISON:   None. 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 There is mild soft tissue   swelling at the level of the 1st MTP joint as well. Deformity of the 5th   proximal phalanx likely related to prior trauma. Mild osteopenia. No acute   fracture or dislocation. Degenerative changes in the midfoot.   Soft tissue   swelling along the dorsum of the foot is also noted. Vascular   calcifications. Calcaneal spur along the plantar aspect. 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 Degenerative changes are also noted in the midfoot. Otherwise no acute osseous abnormality. Labs:    Recent Results (from the past 24 hour(s))   CBC auto differential    Collection Time: 02/06/21  6:19 AM   Result Value Ref Range    WBC 13.5 (H) 4.0 - 10.5 K/CU MM    RBC 4.04 (L) 4.6 - 6.2 M/CU MM    Hemoglobin 14.0 13.5 - 18.0 GM/DL    Hematocrit 42.4 42 - 52 %    .0 (H) 78 - 100 FL    MCH 34.7 (H) 27 - 31 PG    MCHC 33.0 32.0 - 36.0 %    RDW 12.3 11.7 - 14.9 %    Platelets 669 999 - 668 K/CU MM    MPV 10.3 7.5 - 11.1 FL    Differential Type AUTOMATED DIFFERENTIAL     Segs Relative 82.3 (H) 36 - 66 %    Lymphocytes % 8.3 (L) 24 - 44 %    Monocytes % 6.6 (H) 0 - 4 %    Eosinophils % 1.0 0 - 3 %    Basophils % 0.2 0 - 1 %    Segs Absolute 11.1 K/CU MM    Lymphocytes Absolute 1.1 K/CU MM    Monocytes Absolute 0.9 K/CU MM    Eosinophils Absolute 0.1 K/CU MM    Basophils Absolute 0.0 K/CU MM    Nucleated RBC % 0.0 %    Total Nucleated RBC 0.0 K/CU MM    Total Immature Neutrophil 0.22 K/CU MM    Immature Neutrophil % 1.6 (H) 0 - 0.43 %   C-Reactive Protein    Collection Time: 02/06/21  6:19 AM   Result Value Ref Range    CRP, High Sensitivity 169.9 mg/L     CULTURE results: Invalid input(s): BLOOD CULTURE,  URINE CULTURE, SURGICAL CULTURE    Diagnosis:  Patient Active Problem List   Diagnosis    Rhabdomyolysis    Foot abscess, left    Bacteremia due to group B Streptococcus    Sepsis ([address]       Active Problems  Principal Problem:    Rhabdomyolysis  Active Problems:    Foot abscess, left    Bacteremia due to group B Streptococcus    Sepsis ([address]  Resolved Problems:    * No resolved hospital problems. *      Impression and plan   Summary and rationale: Patient is a 77 y.o.   male admitted after a fall at home, was on the floor possibly for 2 days. Had nontraumatic rhabdomyolysis. And the chronic left foot ulcer catheter had an abscess. Chronic left wrist pain, joint aspiration did not yield appreciable fluid. Diagnosed with Group G Streptococcus bacteremia. Source is left hallux abscess as the culture is also positive for group B streptococcus. Initially had a bedside I&D, however, repeat I&D was performed on 2/5/2021 in the OR. 4 mL of pus was removed and loculations were broken up with a forceps.  Clinical status: Afebrile, leukocytosis persists, therapy is elevated   Therapeutic: Ampicillin 2/4-, will need at least 2 weeks of intravenous antibiotics, from negative blood culture.  Completed antibiotics Zosyn 2/3-4, vancomycin 2/3-4   Diagnostic: Blood culture ordered on 2/4, yet to be collected.    F/u:   Other:        Electronically signed by: Electronically signed by Lewis Corado MD on 2/6/2021 at 10:17 AM

## 2021-02-06 NOTE — PROGRESS NOTES
Requested phlebotomist draw blood cultures ordered on 2/4 be drawn at this time. Phlebotomist states she will see pt next to draw.

## 2021-02-06 NOTE — PLAN OF CARE
Problem: Pain:  Goal: Pain level will decrease  Description: Pain level will decrease  Outcome: Ongoing  Goal: Control of acute pain  Description: Control of acute pain  Outcome: Ongoing  Goal: Control of chronic pain  Description: Control of chronic pain  Outcome: Ongoing     Problem: Wound:  Goal: Will show signs of wound healing; wound closure and no evidence of infection  Description: Will show signs of wound healing; wound closure and no evidence of infection  Outcome: Ongoing     Problem: Pressure Ulcer:  Goal: Signs of wound healing will improve  Description: Signs of wound healing will improve  Outcome: Ongoing  Goal: Absence of new pressure ulcer  Description: Absence of new pressure ulcer  Outcome: Ongoing  Goal: Will show no infection signs and symptoms  Description: Will show no infection signs and symptoms  Outcome: Ongoing     Problem: Arterial:  Goal: Optimize blood flow for wound healing  Description: Optimize blood flow for wound healing  Outcome: Ongoing     Problem: Venous:  Goal: Signs of wound healing will improve  Description: Signs of wound healing will improve  Outcome: Ongoing     Problem: Smoking cessation:  Goal: Ability to formulate a plan to maintain a tobacco-free life will be supported  Description: Ability to formulate a plan to maintain a tobacco-free life will be supported  Outcome: Ongoing     Problem: Compression therapy:  Goal: Will be free from complications associated with compression therapy  Description: Will be free from complications associated with compression therapy  Outcome: Ongoing     Problem: Weight control:  Goal: Ability to maintain an optimal weight for height and age will be supported  Description: Ability to maintain an optimal weight for height and age will be supported  Outcome: Ongoing     Problem: Falls - Risk of:  Goal: Will remain free from falls  Description: Will remain free from falls  Outcome: Ongoing     Problem: Blood Glucose:  Goal: Ability to maintain appropriate glucose levels will improve  Description: Ability to maintain appropriate glucose levels will improve  Outcome: Ongoing     Problem: Falls - Risk of:  Goal: Will remain free from falls  Description: Will remain free from falls  Outcome: Ongoing  Goal: Absence of physical injury  Description: Absence of physical injury  Outcome: Ongoing     Problem: Skin Integrity:  Goal: Will show no infection signs and symptoms  Description: Will show no infection signs and symptoms  Outcome: Ongoing  Goal: Absence of new skin breakdown  Description: Absence of new skin breakdown  Outcome: Ongoing

## 2021-02-06 NOTE — PROGRESS NOTES
Hospitalist Progress Note      Name:  Damon Cabot /Age/Sex: 1945  (77 y.o. male)   MRN & CSN:  [id number] & [id number] Admission Date/Time: 2021  2:05 PM   Location:  [address] [place] [address] PCP: No primary care provider on file. Hospital Day: 6    Assessment and Plan:   Damon Cabot is a 77 y.o.  male  who presents with Rhabdomyolysis    #Nontraumatic Rhadomyolysis :resolved  #Mechanical fall at home  -Due to Prolonged immobilization after a fall.   -No CISCO. -CK decreasing with fluid resuscitation (2315->816->350).  -IVF discontinued. -PT/OT-recommend SNF     #Left Triquetral fracture (wrist bone), likely from fall on outstretched arm. #Possible left wrist infection   -per ortho note  wrist aspiration  -Ortho recommends nonoperative mgt with splint.       #Beta Streptococcus Group G bacteremia with sepsis(1/4 bottles)  #Left foot abscess (present on admission)  -Located around the Right hallux base. No evidence of osteomyelitis on Xray. -S/p I&[facility] 2/3/21 [facility]  -S/p I&D on 21 in OR - 4 cc purulent material expressed - -culture beta strep  -ID on board:IV ampicillin for 2 weeks following negative blood cx  -General surgery on board      #hypokalemia  -replace and follow BMP     #Alcohol use disorder.   -Serum alcohol not elevated.    -No withdrawal symptoms at this time.   -folate and thiamine.      #Incidental Rt Adrenal nodule - repeat imaging in 1 year. PCP to f/u. #Abnormal LFTs - from rhabdomyloysis. Improving. Antibiotic mgt as above. #Constipation - CT abd/pel shows stools in rectal vault. Plan: Miralaxa PRN      #Disposition: Patient reports he wants to go home with home health care with the assistance of his daughter. Spoke with daughter today she agrees with SNF management per PT OT recommendation.  Cm on board       Diet DIET GENERAL; Dental Soft   DVT Prophylaxis [] Lovenox, []  Heparin, [] SCDs, [] Ambulation   GI Prophylaxis [] PPI,  [] H2 Blocker,  [] Carafate,  [] Diet/Tube Feeds   Code Status Full Code   Disposition Patient requires continued admission due to pending placement         History of Present Illness:     Chief Complaint: Rhabdomyolysis  Melanie Thibodeaux is a 77 y.o.  male  who presents with fall. Patient reports pain in left upper extremity and left lower extremity. Ten point ROS reviewed negative, unless as noted above    Objective:   No intake or output data in the 24 hours ending 02/06/21 1353   Vitals:   Vitals:    02/06/21 0828   BP: 121/78   Pulse: 99   Resp: 18   Temp: 97.5 °F (36.4 °C)   SpO2: 97%     Physical Exam:   GEN Awake male, no  apparent distress. EYES Pupils are equally round. HENT Mucous membranes are moist.  NECK Supple, no apparent thyromegaly or masses. RESP Clear to auscultation, no wheezes, rales or rhonchi. Symmetric chest movement. CARDIO/VASC S1/S2 auscultated. Regular rate without appreciable murmurs, rubs, or gallops. No JVD or carotid bruits. Peripheral pulses equal bilaterally and palpable. No peripheral edema. GI Abdomen is soft without significant tenderness, masses, or guarding. Bowel sounds are normoactive. Rectal exam deferred.  No costovertebral angle tenderness. HEME/LYMPH No palpable cervical lymphadenopathy and no hepatosplenomegaly. No petechiae or ecchymoses. MSK Left upper extremity in clean brace. Left foot in clean dressing  SKIN Normal coloration, warm, dry. NEURO Cranial nerves appear grossly intact, normal speech, no lateralizing weakness. PSYCH Awake, alert, oriented x 4. Affect appropriate.     Medications:   Medications:    folic acid  1 mg Oral Daily    therapeutic multivitamin-minerals  1 tablet Oral Daily    ampicillin IV  2,000 mg Intravenous 6 times per day    sodium chloride flush  10 mL Intravenous 2 times per day    enoxaparin  40 mg Subcutaneous Daily    thiamine (VITAMIN B1) IVPB  100 mg Intravenous Daily      Infusions:   PRN Meds:     morphine, 2 mg, Q2H PRN    Or      morphine, 4 mg, Q2H PRN      sodium chloride flush, 10 mL, PRN      promethazine, 12.5 mg, Q6H PRN    Or      ondansetron, 4 mg, Q6H PRN      polyethylene glycol, 17 g, Daily PRN      acetaminophen, 650 mg, Q6H PRN    Or      acetaminophen, 650 mg, Q6H PRN          Electronically signed by Charly Jose MD on 2/6/2021 at 1:53 PM

## 2021-02-06 NOTE — DISCHARGE INSTR - COC
Continuity of Care Form    Patient Name: Helder Ortega   :  1945  MRN:  [id number]    Admit date:  2021  Discharge date:  2021    Code Status Order: Full Code   Advance Directives:      Admitting Physician:  Netta Christianson DO  PCP: No primary care provider on file. Discharging Nurse: Dinesh Trinidad 58 Unit/Room#: 7257/0087-E  Discharging Unit Phone Number: [phone]    Emergency Contact:   Extended Emergency Contact Information  Primary Emergency Contact: Rubio Gr, [address] [facility] [address] Phone: [phone]  Work Phone: [phone]  Mobile Phone: [phone]  Relation: Child  Secondary Emergency Contact: Edson Correa  Mobile Phone: [phone]  Relation: Child    Past Surgical History:  No past surgical history on file. Immunization History: There is no immunization history on file for this patient.     Active Problems:  Patient Active Problem List   Diagnosis Code    Rhabdomyolysis M62.82    Foot abscess, left L02.612    Bacteremia due to group B Streptococcus R78.81, B95.1    Seps[facility] ([facility] A41.9       Isolation/Infection:   Isolation            No Isolation          Patient Infection Status       Infection Onset Added Last Indicated Last Indicated By Review Planned Expiration Resolved Resolved By    None active    Resolved    COVID-19 Rule Out 21 COVID-19 (Ordered)   21 Rule-Out Test Resulted            Nurse Assessment:  Last Vital Signs: /78   Pulse 99   Temp 97.5 °F (36.4 °C) (Oral)   Resp 18   Ht 5' 10\" (1.778 m)   Wt 171 lb (77.6 kg)   SpO2 97%   BMI 24.54 kg/m²     Last documented pain score (0-10 scale): Pain Level: 6  Last Weight:   Wt Readings from Last 1 Encounters:   21 171 lb (77.6 kg)     Mental Status:  disoriented, alert and oriented to self    IV Access:  - Central Venous Catheter  - site  right and Midline, insertion date: 2021    Nursing Mobility/ADLs:  Walking   Assisted  Transfer Assisted  Bathing  Dependent  Dressing  Dependent  Toileting  Dependent  Feeding  Independent  Med Admin  Assisted  Med Delivery   whole    Wound Care Documentation and Therapy:  Wound 02/02/21 Foot Left;Dorsal (Active)   Wound Etiology Other 02/02/21 1255   Dressing Status Clean;Dry; Intact 02/06/21 0845   Wound Cleansed Irrigated with saline 02/05/21 1150   Dressing/Treatment ABD;Dry dressing; Other (comment) 02/05/21 1150   Dressing Change Due 02/05/21 02/05/21 1150   Wound Length (cm) 1 cm 02/02/21 1255   Wound Width (cm) 0.7 cm 02/02/21 1255   Wound Depth (cm) 0.1 cm 02/02/21 1255   Wound Surface Area (cm^2) 0.7 cm^2 02/02/21 1255   Wound Volume (cm^3) 0.07 cm^3 02/02/21 1255   Distance Tunneling (cm) 0 cm 02/02/21 1255   Tunneling Position ___ O'Clock 0 02/02/21 1255   Undermining Starts ___ O'Clock 0 02/02/21 1255   Undermining Ends___ O'Clock 0 02/02/21 1255   Undermining Maxium Distance (cm) 0 02/02/21 1255   Wound Assessment Pink/red;Slough 02/05/21 1150   Drainage Amount Small 02/05/21 1150   Drainage Description Serosanguinous 02/05/21 1150   Odor None 02/05/21 1150   Leyla-wound Assessment Dry/flaky; Excoriated;Fragile; Warm 02/05/21 1150   Margins Attached edges 02/02/21 1255   Number of days: 4       Wound 02/02/21 Sacrum Left (Active)   Wound Etiology Pressure Stage  2 02/02/21 1530   Dressing Status Clean;Dry; Intact 02/06/21 0845   Wound Cleansed Cleansed with saline 02/02/21 1530   Dressing/Treatment Dry dressing 02/05/21 0824   Wound Length (cm) 0.6 cm 02/02/21 1530   Wound Width (cm) 0.5 cm 02/02/21 1530   Wound Depth (cm) 0.1 cm 02/02/21 1530   Wound Surface Area (cm^2) 0.3 cm^2 02/02/21 1530   Wound Volume (cm^3) 0.03 cm^3 02/02/21 1530   Distance Tunneling (cm) 0 cm 02/02/21 1530   Tunneling Position ___ O'Clock 0 02/02/21 1530   Undermining Starts ___ O'Clock 0 02/02/21 1530   Undermining Ends___ O'Clock 0 02/02/21 1530   Undermining Maxium Distance (cm) 0 02/02/21 1530   Wound Assessment Other (Comment) 02/05/21 0824   Drainage Amount None 02/02/21 1530   Odor None 02/05/21 0824   Leyla-wound Assessment Other (Comment) 02/05/21 0824   Margins Defined edges 02/02/21 1530   Wound Thickness Description not for Pressure Injury Partial thickness 02/02/21 1530   Number of days: 3        Elimination:  Continence:   · Bowel: No  · Bladder: No  Urinary Catheter: None   Colostomy/Ileostomy/Ileal Conduit: No       Date of Last BM: 02/12/2021  No intake or output data in the 24 hours ending 02/06/21 1407  No intake/output data recorded. Safety Concerns:     History of Falls (last 30 days) and At Risk for Falls    Impairments/Disabilities:      Vision and splint on L wrist following I&D    Patient's personal belongings (please select all that are sent with patient):  Glasses    RN SIGNATURE:  Electronically signed by Javi Smith RN on 2/12/21 at 5:46 PM EST                PHYSICIAN SECTION    Nutrition Therapy:  Current Nutrition Therapy:   - Oral Diet:  Dental Soft    Routes of Feeding: Oral  Liquids: No Restrictions  Daily Fluid Restriction: no  Last Modified Barium Swallow with Video (Video Swallowing Test): not done    Treatments at the Time of Hospital Discharge:   Respiratory Treatments: None  Oxygen Therapy:  is not on home oxygen therapy.   Ventilator:    - No ventilator support    Rehab Therapies: Physical Therapy and Occupational Therapy  Weight Bearing Status/Restrictions: No weight bearing restirctions  Other Medical Equipment (for information only, NOT a DME order):  wheelchair, crutches, and walker  Other Treatments: NOne    Prognosis: Good    Condition at Discharge: Stable    Rehab Potential (if transferring to Rehab): Good    Recommended Labs or Other Treatments After Discharge:   · Weekly labs drawn on Monday during the course of treatment  · CBC with differential, CMP, ESR, CRP    Physician Certification: I certify the above information and transfer of Jorge Alberto Yin  is necessary for the

## 2021-02-06 NOTE — CARE COORDINATION
Called and left message for Ciara/admissions at [facility] to update her patient needs IV ampicillin at discharge and verify able to meet needs. Noted referral on 2/2/2021 though no further updates re: referral.  Pt likely needs pre-cert prior to discharge. CM following.

## 2021-02-06 NOTE — PROGRESS NOTES
GENERAL SURGERY PROGRESS NOTE    Damon Cabot is a 77 y.o. male with left foot infection. POD 1 from wound debridement/I&D. Subjective:  Reports improvement in pain post surgery. Denies new complaints. Objective:    Vitals: VITALS:  /78   Pulse 99   Temp 97.5 °F (36.4 °C) (Oral)   Resp 18   Ht 5' 10\" (1.778 m)   Wt 171 lb (77.6 kg)   SpO2 97%   BMI 24.54 kg/m²     I/O: No intake/output data recorded. Labs/Imaging Results:   Lab Results   Component Value Date     02/06/2021    K 3.3 02/06/2021    CL 94 02/06/2021    CO2 29 02/06/2021    BUN 10 02/06/2021    CREATININE 0.6 02/06/2021    GLUCOSE 143 02/06/2021    CALCIUM 8.1 02/06/2021      Lab Results   Component Value Date    WBC 13.5 (H) 02/06/2021    HGB 14.0 02/06/2021    HCT 42.4 02/06/2021    .0 (H) 02/06/2021     02/06/2021     Scheduled Meds:   folic acid, 1 mg, Oral, Daily    potassium chloride, 20 mEq, Oral, TID WC    therapeutic multivitamin-minerals, 1 tablet, Oral, Daily    ampicillin IV, 2,000 mg, Intravenous, 6 times per day    sodium chloride flush, 10 mL, Intravenous, 2 times per day    enoxaparin, 40 mg, Subcutaneous, Daily    thiamine (VITAMIN B1) IVPB, 100 mg, Intravenous, Daily    Physical Exam:  General: alert, no distress. HEENT: Anicteric sclerae, MMM. Extremities: chronic venous changes in the lower legs. Left foot wound ~4x3cm and 1cm deep with some undermining, mild amount of soupy drainage, no foul odor. Erythema present over the dorsal foot. Abdomen: Soft, nontender    Culture Abnormal  02/05/2021  2:18 PM [address] [facility]   BETA STREP GROUP G Light growth Susceptibility testing of penicillin and other beta lactams is not necessary for beta hemolytic Streptococci since resistant strains have not been identified. (CLSI M100)       Assessment and Plan:  77 y.o. male s/p left foot debridement. Dressing changed at bedside today--tolerated well.   Wound is still a little soupy with some erythema on the foot.     Patient Active Problem List:     Rhabdomyolysis     Foot abscess, left     Bacteremia due to group B Streptococcus     Sepsis ([facility]      - continue IV antibiotics  - daily dressing changes with iodoform packing  - gentle compression of LLE with ACE given significant venous reflux disease with edema    Anabela Johnson MD

## 2021-02-07 LAB
ANION GAP SERPL CALCULATED.3IONS-SCNC: 10 MMOL/L (ref 4–16)
BASOPHILS ABSOLUTE: 0.1 K/CU MM
BASOPHILS RELATIVE PERCENT: 0.4 % (ref 0–1)
BUN BLDV-MCNC: 10 MG/DL (ref 6–23)
CALCIUM SERPL-MCNC: 7.9 MG/DL (ref 8.3–10.6)
CHLORIDE BLD-SCNC: 97 MMOL/L (ref 99–110)
CO2: 27 MMOL/L (ref 21–32)
CREAT SERPL-MCNC: 0.6 MG/DL (ref 0.9–1.3)
DIFFERENTIAL TYPE: ABNORMAL
EOSINOPHILS ABSOLUTE: 0.2 K/CU MM
EOSINOPHILS RELATIVE PERCENT: 1.1 % (ref 0–3)
GFR AFRICAN AMERICAN: >60 ML/MIN/1.73M2
GFR NON-AFRICAN AMERICAN: >60 ML/MIN/1.73M2
GLUCOSE BLD-MCNC: 91 MG/DL (ref 70–99)
HCT VFR BLD CALC: 43.3 % (ref 42–52)
HEMOGLOBIN: 14.1 GM/DL (ref 13.5–18)
HIGH SENSITIVE C-REACTIVE PROTEIN: 159.1 MG/L
IMMATURE NEUTROPHIL %: 1.4 % (ref 0–0.43)
LYMPHOCYTES ABSOLUTE: 1.3 K/CU MM
LYMPHOCYTES RELATIVE PERCENT: 8.8 % (ref 24–44)
MCH RBC QN AUTO: 34.3 PG (ref 27–31)
MCHC RBC AUTO-ENTMCNC: 32.6 % (ref 32–36)
MCV RBC AUTO: 105.4 FL (ref 78–100)
MONOCYTES ABSOLUTE: 1.1 K/CU MM
MONOCYTES RELATIVE PERCENT: 7.3 % (ref 0–4)
NUCLEATED RBC %: 0 %
PDW BLD-RTO: 12.4 % (ref 11.7–14.9)
PLATELET # BLD: 296 K/CU MM (ref 140–440)
PMV BLD AUTO: 10 FL (ref 7.5–11.1)
POTASSIUM SERPL-SCNC: 4 MMOL/L (ref 3.5–5.1)
PROCALCITONIN: 0.33
RBC # BLD: 4.11 M/CU MM (ref 4.6–6.2)
SEGMENTED NEUTROPHILS ABSOLUTE COUNT: 12 K/CU MM
SEGMENTED NEUTROPHILS RELATIVE PERCENT: 81 % (ref 36–66)
SODIUM BLD-SCNC: 134 MMOL/L (ref 135–145)
TOTAL IMMATURE NEUTOROPHIL: 0.21 K/CU MM
TOTAL NUCLEATED RBC: 0 K/CU MM
WBC # BLD: 14.8 K/CU MM (ref 4–10.5)

## 2021-02-07 PROCEDURE — 6360000002 HC RX W HCPCS: Performed by: SURGERY

## 2021-02-07 PROCEDURE — 36415 COLL VENOUS BLD VENIPUNCTURE: CPT

## 2021-02-07 PROCEDURE — 85025 COMPLETE CBC W/AUTO DIFF WBC: CPT

## 2021-02-07 PROCEDURE — 2580000003 HC RX 258: Performed by: SURGERY

## 2021-02-07 PROCEDURE — 99024 POSTOP FOLLOW-UP VISIT: CPT | Performed by: SURGERY

## 2021-02-07 PROCEDURE — 1200000000 HC SEMI PRIVATE

## 2021-02-07 PROCEDURE — 6370000000 HC RX 637 (ALT 250 FOR IP): Performed by: SURGERY

## 2021-02-07 PROCEDURE — 6370000000 HC RX 637 (ALT 250 FOR IP): Performed by: PHYSICIAN ASSISTANT

## 2021-02-07 PROCEDURE — 94761 N-INVAS EAR/PLS OXIMETRY MLT: CPT

## 2021-02-07 PROCEDURE — 80048 BASIC METABOLIC PNL TOTAL CA: CPT

## 2021-02-07 PROCEDURE — 6370000000 HC RX 637 (ALT 250 FOR IP): Performed by: INTERNAL MEDICINE

## 2021-02-07 PROCEDURE — 2700000000 HC OXYGEN THERAPY PER DAY

## 2021-02-07 PROCEDURE — 84145 PROCALCITONIN (PCT): CPT

## 2021-02-07 PROCEDURE — 86141 C-REACTIVE PROTEIN HS: CPT

## 2021-02-07 RX ORDER — THIAMINE MONONITRATE (VIT B1) 100 MG
100 TABLET ORAL DAILY
Status: DISCONTINUED | OUTPATIENT
Start: 2021-02-08 | End: 2021-02-11

## 2021-02-07 RX ORDER — LANOLIN ALCOHOL/MO/W.PET/CERES
9 CREAM (GRAM) TOPICAL NIGHTLY PRN
Status: DISCONTINUED | OUTPATIENT
Start: 2021-02-07 | End: 2021-02-12 | Stop reason: HOSPADM

## 2021-02-07 RX ADMIN — THIAMINE HYDROCHLORIDE 100 MG: 100 INJECTION, SOLUTION INTRAMUSCULAR; INTRAVENOUS at 08:11

## 2021-02-07 RX ADMIN — SODIUM CHLORIDE, PRESERVATIVE FREE 10 ML: 5 INJECTION INTRAVENOUS at 20:28

## 2021-02-07 RX ADMIN — AMPICILLIN SODIUM 2000 MG: 2 INJECTION, POWDER, FOR SOLUTION INTRAVENOUS at 08:10

## 2021-02-07 RX ADMIN — POTASSIUM CHLORIDE 20 MEQ: 1500 TABLET, EXTENDED RELEASE ORAL at 08:10

## 2021-02-07 RX ADMIN — Medication 9 MG: at 20:28

## 2021-02-07 RX ADMIN — AMPICILLIN SODIUM 2000 MG: 2 INJECTION, POWDER, FOR SOLUTION INTRAVENOUS at 04:52

## 2021-02-07 RX ADMIN — MORPHINE SULFATE 4 MG: 4 INJECTION, SOLUTION INTRAMUSCULAR; INTRAVENOUS at 01:12

## 2021-02-07 RX ADMIN — AMPICILLIN SODIUM 2000 MG: 2 INJECTION, POWDER, FOR SOLUTION INTRAVENOUS at 16:38

## 2021-02-07 RX ADMIN — AMPICILLIN SODIUM 2000 MG: 2 INJECTION, POWDER, FOR SOLUTION INTRAVENOUS at 01:12

## 2021-02-07 RX ADMIN — MULTIPLE VITAMINS W/ MINERALS TAB 1 TABLET: TAB at 08:10

## 2021-02-07 RX ADMIN — ENOXAPARIN SODIUM 40 MG: 40 INJECTION SUBCUTANEOUS at 08:11

## 2021-02-07 RX ADMIN — FOLIC ACID 1 MG: 1 TABLET ORAL at 08:10

## 2021-02-07 RX ADMIN — MORPHINE SULFATE 4 MG: 4 INJECTION, SOLUTION INTRAMUSCULAR; INTRAVENOUS at 11:15

## 2021-02-07 RX ADMIN — AMPICILLIN SODIUM 2000 MG: 2 INJECTION, POWDER, FOR SOLUTION INTRAVENOUS at 20:28

## 2021-02-07 RX ADMIN — AMPICILLIN SODIUM 2000 MG: 2 INJECTION, POWDER, FOR SOLUTION INTRAVENOUS at 11:11

## 2021-02-07 RX ADMIN — POTASSIUM CHLORIDE 20 MEQ: 1500 TABLET, EXTENDED RELEASE ORAL at 11:11

## 2021-02-07 RX ADMIN — POTASSIUM CHLORIDE 20 MEQ: 1500 TABLET, EXTENDED RELEASE ORAL at 16:37

## 2021-02-07 ASSESSMENT — PAIN SCALES - GENERAL
PAINLEVEL_OUTOF10: 8
PAINLEVEL_OUTOF10: 9
PAINLEVEL_OUTOF10: 8

## 2021-02-07 ASSESSMENT — PAIN DESCRIPTION - PAIN TYPE: TYPE: ACUTE PAIN

## 2021-02-07 ASSESSMENT — PAIN DESCRIPTION - LOCATION: LOCATION: FOOT;WRIST

## 2021-02-07 NOTE — PROGRESS NOTES
Hospitalist Progress Note      Name:  Keyshawn Mcclendon /Age/Sex: 1945  (77 y.o. male)   MRN & CSN:  [id number] & [id number] Admission Date/Time: 2021  2:05 PM   Location:  [address] [place] [address] PCP: No primary care provider on file. Hospital Day: 7    Assessment and Plan:   Keyshawn Mcclendon is a 77 y.o.  male  who presents with Rhabdomyolysis    Nontraumatic Rhadomyolysis : Resolved  Mechanical fall at home  Due to Prolonged immobilization after a fall. No associated CISCO. CK trending down   IVF discontinued. PT/OT-recommend SNF     Left Triquetral fracture (wrist bone), likely from fall on outstretched arm. Possible left wrist infection   Per ortho note  wrist aspiration  Ortho recommends nonoperative mgt with splint.       Beta Streptococcus Group G bacteremia with sepsis(1/4 bottles)  Left foot abscess (present on admission)  Located around the Right hallux base. No evidence of osteomyelitis on Xray. S/p I&D on 2/3/21 by Dr. Bairon Hernandez at the bedside  S/p I&D on 21 in OR - 4 cc purulent material expressed - -culture beta strep  ID on board:IV ampicillin for 2 weeks following negative blood culture   General surgery on board      Hypokalemia  Replace and follow BMP     Alcohol use disorder. Serum alcohol not elevated.    No withdrawal symptoms at this time. Continue folate and thiamine.      Incidental Right Adrenal nodule - Repeat imaging in 1 year. PCP to f/u. Abnormal LFTs - from rhabdomyloysis. Improving. Antibiotic mgt as above. Constipation - CT abd/pel shows stools in rectal vault. Plan: Miralax PRN    Awaiting placement. Diet DIET GENERAL; Dental Soft   DVT Prophylaxis [x] Lovenox, []  Heparin, [] SCDs, [] Warfarin  [] NOAC     GI Prophylaxis [] PPI,  [] H2 Blocker,  [] Carafate,  [x] Diet/Tube Feeds   Code Status Full Code   MDM [] Low, [x] Moderate,[]  High     History of Present Illness:     Chief Complaint: Rhabdomyolysis    Seen and examined today.   Pain in the left foot, PRN    Or      acetaminophen, 650 mg, Q6H PRN      Recent Labs     02/05/21  0550 02/06/21  0619 02/07/21  0349   WBC 12.1* 13.5* 14.8*   HGB 11.6* 14.0 14.1   HCT 34.7* 42.4 43.3    241 296      Recent Labs     02/06/21  1040 02/07/21  0349   * 134*   K 3.3* 4.0   CL 94* 97*   CO2 29 27   BUN 10 10   CREATININE 0.6* 0.6*     Imaging reviewed    Electronically signed by Jose Enrique Artis MD on 2/7/2021 at 2:06 PM

## 2021-02-07 NOTE — PLAN OF CARE
Problem: Pain:  Goal: Pain level will decrease  Description: Pain level will decrease  2/6/2021 2253 by Nano Lemon RN  Outcome: Ongoing  2/6/2021 1511 by Delvin Rinne, RN  Outcome: Ongoing  Goal: Control of acute pain  Description: Control of acute pain  2/6/2021 2253 by Nano Lemon RN  Outcome: Ongoing  2/6/2021 1511 by Delvin Rinne, RN  Outcome: Ongoing  Goal: Control of chronic pain  Description: Control of chronic pain  2/6/2021 2253 by Nano Lemon RN  Outcome: Ongoing  2/6/2021 1511 by Delvin Rinne, RN  Outcome: Ongoing     Problem: Wound:  Goal: Will show signs of wound healing; wound closure and no evidence of infection  Description: Will show signs of wound healing; wound closure and no evidence of infection  2/6/2021 2253 by Nano Lemon RN  Outcome: Ongoing  2/6/2021 1511 by Delvin Rinne, RN  Outcome: Ongoing     Problem: Pressure Ulcer:  Goal: Signs of wound healing will improve  Description: Signs of wound healing will improve  2/6/2021 2253 by Nano Lemon RN  Outcome: Ongoing  2/6/2021 1511 by Delvin Rinne, RN  Outcome: Ongoing  Goal: Absence of new pressure ulcer  Description: Absence of new pressure ulcer  2/6/2021 2253 by Nano Lemon RN  Outcome: Ongoing  2/6/2021 1511 by Delvin Rinne, RN  Outcome: Ongoing  Goal: Will show no infection signs and symptoms  Description: Will show no infection signs and symptoms  2/6/2021 2253 by Nano Lemon RN  Outcome: Ongoing  2/6/2021 1511 by Delvin Rinne, RN  Outcome: Ongoing

## 2021-02-07 NOTE — PLAN OF CARE
EDEL Patel  Outcome: Ongoing     Problem: Smoking cessation:  Goal: Ability to formulate a plan to maintain a tobacco-free life will be supported  Description: Ability to formulate a plan to maintain a tobacco-free life will be supported  2/7/2021 1026 by Carmelita Aguilar RN  Outcome: Ongoing  2/6/2021 2253 by Sarahi Nguyen RN  Outcome: Ongoing     Problem: Compression therapy:  Goal: Will be free from complications associated with compression therapy  Description: Will be free from complications associated with compression therapy  2/7/2021 1026 by Carmelita Aguilar RN  Outcome: Ongoing  2/6/2021 2253 by Sarahi Nguyen RN  Outcome: Ongoing     Problem: Weight control:  Goal: Ability to maintain an optimal weight for height and age will be supported  Description: Ability to maintain an optimal weight for height and age will be supported  2/7/2021 1026 by Carmelita Aguilar RN  Outcome: Ongoing  2/6/2021 2253 by Sarahi Nguyen RN  Outcome: Ongoing     Problem: Falls - Risk of:  Goal: Will remain free from falls  Description: Will remain free from falls  2/7/2021 1026 by Carmelita Aguilar RN  Outcome: Ongoing  2/6/2021 2253 by Sarahi Nguyen RN  Outcome: Ongoing     Problem: Blood Glucose:  Goal: Ability to maintain appropriate glucose levels will improve  Description: Ability to maintain appropriate glucose levels will improve  2/7/2021 1026 by Carmelita Aguilar RN  Outcome: Ongoing  2/6/2021 2253 by Sarahi Nguyen RN  Outcome: Ongoing     Problem: Falls - Risk of:  Goal: Will remain free from falls  Description: Will remain free from falls  2/7/2021 1026 by Carmelita Aguilar RN  Outcome: Ongoing  2/6/2021 2253 by Sarahi Nguyen RN  Outcome: Ongoing  Goal: Absence of physical injury  Description: Absence of physical injury  2/7/2021 1026 by Carmelita Aguilar RN  Outcome: Ongoing  2/6/2021 2253 by Sarahi Nguyen RN  Outcome: Ongoing     Problem: Skin Integrity:  Goal: Will show no infection signs and symptoms  Description: Will show no infection signs and symptoms  2/7/2021 1026 by Carmelita Aguilar RN  Outcome: Ongoing  2/6/2021 2253 by Sarahi Nguyen RN  Outcome: Ongoing  Goal: Absence of new skin breakdown  Description: Absence of new skin breakdown  2/7/2021 1026 by Carmelita Aguilar RN  Outcome: Ongoing  2/6/2021 2253 by Sarahi Nguyen RN  Outcome: Ongoing

## 2021-02-07 NOTE — PROGRESS NOTES
changed at bedside today--tolerated well. Wound is still a little soupy with some erythema on the foot.     Patient Active Problem List:     Rhabdomyolysis     Foot abscess, left     Bacteremia due to group B Streptococcus     Sepsis ([facility] [address]      - continue IV antibiotics  - daily dressing changes and prn with iodoform packing  - gentle compression of LLE with ACE given significant venous reflux disease with edema  - increase ambulation  - nutritional supplements ordered, encourage PO intake    Tania Guerrero MD

## 2021-02-08 LAB
ANION GAP SERPL CALCULATED.3IONS-SCNC: 9 MMOL/L (ref 4–16)
BASOPHILS ABSOLUTE: 0 K/CU MM
BASOPHILS RELATIVE PERCENT: 0.3 % (ref 0–1)
BUN BLDV-MCNC: 10 MG/DL (ref 6–23)
CALCIUM SERPL-MCNC: 8 MG/DL (ref 8.3–10.6)
CHLORIDE BLD-SCNC: 94 MMOL/L (ref 99–110)
CO2: 30 MMOL/L (ref 21–32)
CREAT SERPL-MCNC: 0.6 MG/DL (ref 0.9–1.3)
DIFFERENTIAL TYPE: ABNORMAL
EOSINOPHILS ABSOLUTE: 0.1 K/CU MM
EOSINOPHILS RELATIVE PERCENT: 0.8 % (ref 0–3)
GFR AFRICAN AMERICAN: >60 ML/MIN/1.73M2
GFR NON-AFRICAN AMERICAN: >60 ML/MIN/1.73M2
GLUCOSE BLD-MCNC: 101 MG/DL (ref 70–99)
HCT VFR BLD CALC: 45.3 % (ref 42–52)
HEMOGLOBIN: 14.7 GM/DL (ref 13.5–18)
IMMATURE NEUTROPHIL %: 1.2 % (ref 0–0.43)
LYMPHOCYTES ABSOLUTE: 0.9 K/CU MM
LYMPHOCYTES RELATIVE PERCENT: 6.9 % (ref 24–44)
MCH RBC QN AUTO: 34.4 PG (ref 27–31)
MCHC RBC AUTO-ENTMCNC: 32.5 % (ref 32–36)
MCV RBC AUTO: 106.1 FL (ref 78–100)
MONOCYTES ABSOLUTE: 1 K/CU MM
MONOCYTES RELATIVE PERCENT: 7.3 % (ref 0–4)
NUCLEATED RBC %: 0 %
PDW BLD-RTO: 12.2 % (ref 11.7–14.9)
PLATELET # BLD: 338 K/CU MM (ref 140–440)
PMV BLD AUTO: 9.8 FL (ref 7.5–11.1)
POTASSIUM SERPL-SCNC: 4.7 MMOL/L (ref 3.5–5.1)
RBC # BLD: 4.27 M/CU MM (ref 4.6–6.2)
SEGMENTED NEUTROPHILS ABSOLUTE COUNT: 10.9 K/CU MM
SEGMENTED NEUTROPHILS RELATIVE PERCENT: 83.5 % (ref 36–66)
SODIUM BLD-SCNC: 133 MMOL/L (ref 135–145)
TOTAL IMMATURE NEUTOROPHIL: 0.16 K/CU MM
TOTAL NUCLEATED RBC: 0 K/CU MM
URIC ACID: 2.9 MG/DL (ref 3.5–7.2)
WBC # BLD: 13.1 K/CU MM (ref 4–10.5)

## 2021-02-08 PROCEDURE — 94761 N-INVAS EAR/PLS OXIMETRY MLT: CPT

## 2021-02-08 PROCEDURE — 97530 THERAPEUTIC ACTIVITIES: CPT

## 2021-02-08 PROCEDURE — 97535 SELF CARE MNGMENT TRAINING: CPT

## 2021-02-08 PROCEDURE — 6360000002 HC RX W HCPCS: Performed by: SURGERY

## 2021-02-08 PROCEDURE — 6370000000 HC RX 637 (ALT 250 FOR IP): Performed by: SURGERY

## 2021-02-08 PROCEDURE — 6370000000 HC RX 637 (ALT 250 FOR IP): Performed by: INTERNAL MEDICINE

## 2021-02-08 PROCEDURE — 99233 SBSQ HOSP IP/OBS HIGH 50: CPT | Performed by: INTERNAL MEDICINE

## 2021-02-08 PROCEDURE — 2580000003 HC RX 258: Performed by: SURGERY

## 2021-02-08 PROCEDURE — 99232 SBSQ HOSP IP/OBS MODERATE 35: CPT | Performed by: PHYSICIAN ASSISTANT

## 2021-02-08 PROCEDURE — 36415 COLL VENOUS BLD VENIPUNCTURE: CPT

## 2021-02-08 PROCEDURE — 85025 COMPLETE CBC W/AUTO DIFF WBC: CPT

## 2021-02-08 PROCEDURE — 1200000000 HC SEMI PRIVATE

## 2021-02-08 PROCEDURE — 99024 POSTOP FOLLOW-UP VISIT: CPT | Performed by: SURGERY

## 2021-02-08 PROCEDURE — 80048 BASIC METABOLIC PNL TOTAL CA: CPT

## 2021-02-08 PROCEDURE — 84550 ASSAY OF BLOOD/URIC ACID: CPT

## 2021-02-08 RX ORDER — PREDNISONE 20 MG/1
40 TABLET ORAL DAILY
Status: COMPLETED | OUTPATIENT
Start: 2021-02-08 | End: 2021-02-09

## 2021-02-08 RX ADMIN — AMPICILLIN SODIUM 2000 MG: 2 INJECTION, POWDER, FOR SOLUTION INTRAVENOUS at 20:31

## 2021-02-08 RX ADMIN — SODIUM CHLORIDE, PRESERVATIVE FREE 10 ML: 5 INJECTION INTRAVENOUS at 20:32

## 2021-02-08 RX ADMIN — ENOXAPARIN SODIUM 40 MG: 40 INJECTION SUBCUTANEOUS at 10:10

## 2021-02-08 RX ADMIN — SODIUM CHLORIDE, PRESERVATIVE FREE 10 ML: 5 INJECTION INTRAVENOUS at 10:11

## 2021-02-08 RX ADMIN — POTASSIUM CHLORIDE 20 MEQ: 1500 TABLET, EXTENDED RELEASE ORAL at 10:10

## 2021-02-08 RX ADMIN — FOLIC ACID 1 MG: 1 TABLET ORAL at 10:10

## 2021-02-08 RX ADMIN — PREDNISONE 40 MG: 20 TABLET ORAL at 15:18

## 2021-02-08 RX ADMIN — MORPHINE SULFATE 4 MG: 4 INJECTION, SOLUTION INTRAMUSCULAR; INTRAVENOUS at 00:08

## 2021-02-08 RX ADMIN — AMPICILLIN SODIUM 2000 MG: 2 INJECTION, POWDER, FOR SOLUTION INTRAVENOUS at 04:02

## 2021-02-08 RX ADMIN — AMPICILLIN SODIUM 2000 MG: 2 INJECTION, POWDER, FOR SOLUTION INTRAVENOUS at 15:17

## 2021-02-08 RX ADMIN — AMPICILLIN SODIUM 2000 MG: 2 INJECTION, POWDER, FOR SOLUTION INTRAVENOUS at 00:08

## 2021-02-08 RX ADMIN — POTASSIUM CHLORIDE 20 MEQ: 1500 TABLET, EXTENDED RELEASE ORAL at 15:18

## 2021-02-08 RX ADMIN — MULTIPLE VITAMINS W/ MINERALS TAB 1 TABLET: TAB at 10:10

## 2021-02-08 NOTE — PROGRESS NOTES
ORTHOPEDIC PROGRESS NOTE    2021    Patient name: Darshan Barksdale  : 1945    SUBJECTIVE   The patient was seen and examined. Darshan Barksdale is a 77 y.o. male with continued left wrist pain and swelling. Reports pain continues and swelling worse. History of SLAC wrist.      OBJECTIVE     Vitals:    21 1345   BP: 123/85   Pulse: 94   Resp: 18   Temp: 97.8 °F (36.6 °C)   SpO2:        Physical Exam:   GEN: A&O, NAD  MS: LUE volar splint loosened; distal 1/3 forearm and dorsum hand to MCP edematous with erythema; compartments soft; sensation intact; pain with finger motion, stiffness; CR<2sec    Labs:   CBC/COAGS  Recent Labs     21  0349 21  0550   WBC 14.8* 13.1*   HCT 43.3 45.3    338     BMP  Recent Labs     21  0349 21  0550   * 133*   K 4.0 4.7   CL 97* 94*   CO2 27 30   BUN 10 10   CREATININE 0.6* 0.6*     WBC trending up  Previous blood cultures + with same Beta Strep Group G noted on attempt left wrist aspirate with minimal fluid obtained    ASSESSMENT     77 y.o. 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 Activity, PT/OT: OK for volar splint removal as tolerated  2. DVT prophylaxis: per primary  3. NPO p MN  4. Plan for left wrist I&D tomorrow due to continued pain, swelling despite abx  5.  Discussed with Dr. Gm Tuttle    Electronically signed by:Leidy Delvalle PA-C, 2021 5:15 PM

## 2021-02-08 NOTE — PROGRESS NOTES
Comprehensive Nutrition Assessment    Type and Reason for Visit:  Initial(los)    Nutrition Recommendations/Plan:   Continue current diet and supplement as ordered  Will add wound healing ONS BID  Please document all po intake  Will monitor nutrition status, poc    Nutrition Assessment:  Pt admitted for rhabdomyolysis, fall in elderly patient, PMH: alcohol abuse, POD 3 from wound debridement/I&D, pt currently on dental soft diet w/ standard ONS, no po intake documented in the past 72+ hr per flowsheet, no weight hx available for review, pt at moderate nutrition risk    Malnutrition Assessment:  Malnutrition Status: At risk for malnutrition (Comment)    Context:  Acute Illness       Estimated Daily Nutrient Needs:  Energy (kcal):  0369-0214; Weight Used for Energy Requirements:  Current     Protein (g):  ; Weight Used for Protein Requirements:  Current        Fluid (ml/day):  1900; Method Used for Fluid Requirements:  1 ml/kcal      Wounds:  (left foot wound)       Current Nutrition Therapies:    DIET GENERAL; Dental Soft  Dietary Nutrition Supplements: Standard High Calorie Oral Supplement    Anthropometric Measures:  · Height: 5' 10\" (177.8 cm)  · Current Body Weight: 171 lb 1.2 oz (77.6 kg)   · Admission Body Weight: (n/a)    · Usual Body Weight: (n/a)     · Ideal Body Weight: 166 lbs; % Ideal Body Weight 103.1 %   · BMI: 24.5   · BMI Categories: Normal Weight (BMI 18.5-24. 9)       Nutrition Diagnosis:   · Increased nutrient needs related to healing as evidenced by wound    Nutrition Interventions:   Food and/or Nutrient Delivery:  Continue Current Diet, Continue Oral Nutrition Supplement, Start Oral Nutrition Supplement  Nutrition Education/Counseling:  No recommendation at this time   Coordination of Nutrition Care:  Continue to monitor while inpatient    Goals:  pt will consistently consume 50-75% of meals and supplements       Nutrition Monitoring and Evaluation:   Behavioral-Environmental Outcomes: None Identified   Food/Nutrient Intake Outcomes:  Supplement Intake, Food and Nutrient Intake  Physical Signs/Symptoms Outcomes:  Biochemical Data, Weight, Hemodynamic Status, GI Status, Skin     Discharge Planning:     Too soon to determine     Electronically signed by Mere Gonzalez MS, RD, LD on 2/8/21 at 1:30 PM EST    Contact: [phone]

## 2021-02-08 NOTE — PROGRESS NOTES
Hospitalist Progress Note      Name:  Sanjay Rae /Age/Sex: 1945  (77 y.o. male)   MRN & CSN:  [id number] & [id number] Admission Date/Time: 2021  2:05 PM   Location:  [address] [place] [address] PCP: No primary care provider on file. Hospital Day: 8    Assessment and Plan:   Sanjay Rae is a 77 y.o.  male  who presents with Rhabdomyolysis    Nontraumatic Rhadomyolysis : Resolved  Mechanical fall at home  Due to Prolonged immobilization after a fall. No associated CISCO. CK trending down   IVF discontinued. PT/OT-recommend SNF     Left Triquetral fracture (wrist bone), likely from fall on outstretched arm. Possible left wrist infection   Per ortho note  wrist aspiration  Ortho recommends nonoperative mgt with splint.       Beta Streptococcus Group G bacteremia with sepsis(1/4 bottles)  Left foot abscess (present on admission)  Located around the Right hallux base. No evidence of osteomyelitis on Xray. S/p I&D on 2/3/21 by Dr. Dennie Lo at the bedside  S/p I&D on 21 in OR - 4 cc purulent material expressed - -culture beta strep  ID on board:IV ampicillin for 2 weeks following negative blood culture   General surgery on board      Hypokalemia  Replace and follow BMP     Alcohol use disorder. Serum alcohol not elevated.    No withdrawal symptoms at this time. Continue folate and thiamine.      Incidental Right Adrenal nodule - Repeat imaging in 1 year. PCP to f/u. Abnormal LFTs - from rhabdomyloysis. Improving. Antibiotic mgt as above. Constipation - CT abd/pel shows stools in rectal vault. Plan: Miralax PRN    Awaiting placement.     Diet DIET GENERAL; Dental Soft  Dietary Nutrition Supplements: Standard High Calorie Oral Supplement  Dietary Nutrition Supplements: Wound Healing Oral Supplement   DVT Prophylaxis [x] Lovenox, []  Heparin, [] SCDs, [] Warfarin  [] NOAC     GI Prophylaxis [] PPI,  [] H2 Blocker,  [] Carafate,  [x] Diet/Tube Feeds   Code Status Full Code   MDM [] Low, [x] Moderate,[]  High     History of Present Illness:     Chief Complaint: Rhabdomyolysis    Seen and examined today. Pain well controlled. Still with swelling of the left arm. Ten point ROS reviewed negative, unless as noted above    Objective: Intake/Output Summary (Last 24 hours) at 2/8/2021 1347  Last data filed at 2/8/2021 1011  Gross per 24 hour   Intake 10 ml   Output --   Net 10 ml      Vitals:   Vitals:    02/08/21 0400   BP:    Pulse: 95   Resp: 18   Temp:    SpO2: 92%     Physical Exam:   GEN Awake male, sitting upright in bed in no apparent distress. Appears given age. EYES Pupils are equally round. No scleral erythema, discharge, or conjunctivitis. HENT Mucous membranes are moist. Oral pharynx without exudates, no evidence of thrush. NECK Supple, no apparent thyromegaly or masses. RESP Clear to auscultation, no wheezes, rales or rhonchi. Symmetric chest movement while on room air. CARDIO/VASC S1/S2 auscultated. Regular rate without appreciable murmurs, rubs, or gallops. No JVD or carotid bruits. Peripheral pulses equal bilaterally and palpable. No peripheral edema. GI Abdomen is soft without significant tenderness, masses, or guarding. Bowel sounds are normoactive. Rectal exam deferred. MSK left hand with swelling, left foot with dressing. SKIN Normal coloration, warm, dry. NEURO Cranial nerves appear grossly intact, normal speech, no lateralizing weakness. PSYCH Awake, alert, oriented x 4. Affect appropriate.     Medications:   Medications:    predniSONE  40 mg Oral Daily    thiamine  100 mg Oral Daily    folic acid  1 mg Oral Daily    potassium chloride  20 mEq Oral TID     therapeutic multivitamin-minerals  1 tablet Oral Daily    ampicillin IV  2,000 mg Intravenous 6 times per day    sodium chloride flush  10 mL Intravenous 2 times per day    enoxaparin  40 mg Subcutaneous Daily      Infusions:   PRN Meds:     melatonin, 9 mg, Nightly PRN      morphine, 2 mg, Q2H PRN Or      morphine, 4 mg, Q2H PRN      sodium chloride flush, 10 mL, PRN      promethazine, 12.5 mg, Q6H PRN    Or      ondansetron, 4 mg, Q6H PRN      polyethylene glycol, 17 g, Daily PRN      acetaminophen, 650 mg, Q6H PRN    Or      acetaminophen, 650 mg, Q6H PRN      Recent Labs     02/06/21  0619 02/07/21  0349 02/08/21  0550   WBC 13.5* 14.8* 13.1*   HGB 14.0 14.1 14.7   HCT 42.4 43.3 45.3    296 338      Recent Labs     02/06/21  1040 02/07/21  0349 02/08/21  0550   * 134* 133*   K 3.3* 4.0 4.7   CL 94* 97* 94*   CO2 29 27 30   BUN 10 10 10   CREATININE 0.6* 0.6* 0.6*     Imaging reviewed    Electronically signed by Marquis Wagoner MD on 2/8/2021 at 1:47 PM

## 2021-02-08 NOTE — PROGRESS NOTES
Infectious Disease Progress Note  2021   Patient Name: Evie Alcala : 1945       Reason for visit: F/u sepsis secondary to group G streptococcus bacteremia, left foot abscess. ? History:? Interval history noted. Status post left foot I&D on 2/3/2021. Aspiration of left wrist  1 mL of blood  Continues to have pain in his left wrist and left hallux  Physical Exam:  Vital Signs: /69   Pulse 95   Temp 98.7 °F (37.1 °C) (Oral)   Resp 18   Ht 5' 10\" (1.778 m)   Wt 171 lb (77.6 kg)   SpO2 92%   BMI 24.54 kg/m²     Gen: alert and oriented X3, no distress  Skin: no stigmata of endocarditis  Wounds: left foot C/D/I  HEMT: AT/NC Oropharynx pink, moist, and without lesions or exudates; dentition in good state of repair  Eyes: PERRLA, EOMI, conjunctiva pink, sclera anicteric. Neck: Supple. Trachea midline. No LAD. Chest: no distress and CTA. Good air movement. Heart: RRR and no MRG. Abd: soft, non-distended, no tenderness, no hepatomegaly. Normoactive bowel sounds. Ext: left wrist swelling  Catheter Site: without erythema or tenderness  LDA: CVC:  Neuro: Mental status intact. CN 2-12 intact and no focal sensory or motor deficits       Radiologic / Imaging / TESTING  THREE XRAY VIEWS OF THE LEFT FOOT     2021 2:41 pm     COMPARISON:   None. 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 There is mild soft tissue   swelling at the level of the 1st MTP joint as well. Deformity of the 5th   proximal phalanx likely related to prior trauma. Mild osteopenia. No acute   fracture or dislocation. Degenerative changes in the midfoot.   Soft tissue   swelling along the dorsum of the foot is Sepsis ([facility] [address]       Active Problems  Principal Problem:    Rhabdomyolysis  Active Problems:    Foot abscess, left    Bacteremia due to group B Streptococcus    Sepsis ([facility] [address]  Resolved Problems:    * No resolved hospital problems. *      Impression and plan   Summary and rationale: Patient is a 77 y.o.  male admitted after a fall at home, was on the floor possibly for 2 days. Had nontraumatic rhabdomyolysis. And the chronic left foot ulcer catheter had an abscess. Chronic left wrist pain, joint aspiration did not yield appreciable fluid. Diagnosed with Group G Streptococcus bacteremia. Source is left hallux abscess as the culture is also positive for group B streptococcus. Initially had a bedside I&D, however, repeat I&D was performed on 2/5/2021 in the OR. 4 mL of pus was removed and loculations were broken up with a forceps.  Clinical status: Afebrile, leukocytosis persists, but procalcitonin is reduced. Blood cultures from 2/5, 0/2 NGTD.  Therapeutic: Ampicillin 2/4-, will need at least 2 weeks of intravenous antibiotics, from negative blood culture. Empiric prednisone.  Completed antibiotics Zosyn 2/3-4, vancomycin 2/3-4   Diagnostic: Blood culture ordered on 2/4, yet to be collected. Check uric acid.  F/u:   Other: Agree with Dr. Blsa Jarrett, patient needs a podiatrist.  Furthermore, would recommend evaluation of his hand by a hand surgeon.         Electronically signed by: Electronically signed by Oswaldo Cuellar MD on 2/8/2021 at 9:53 AM

## 2021-02-08 NOTE — PLAN OF CARE
Problem: Pain:  Goal: Pain level will decrease  Description: Pain level will decrease  2/7/2021 2125 by Carol Devi RN  Outcome: Ongoing  2/7/2021 1026 by Miki Reno RN  Outcome: Ongoing  Goal: Control of acute pain  Description: Control of acute pain  2/7/2021 2125 by Carol Devi RN  Outcome: Ongoing  2/7/2021 1026 by Miki Reno RN  Outcome: Ongoing  Goal: Control of chronic pain  Description: Control of chronic pain  2/7/2021 2125 by Carol Devi RN  Outcome: Ongoing  2/7/2021 1026 by Miki Reno RN  Outcome: Ongoing     Problem: Wound:  Goal: Will show signs of wound healing; wound closure and no evidence of infection  Description: Will show signs of wound healing; wound closure and no evidence of infection  2/7/2021 2125 by Carol Devi RN  Outcome: Ongoing  2/7/2021 1026 by Miki Reno RN  Outcome: Ongoing     Problem: Pressure Ulcer:  Goal: Signs of wound healing will improve  Description: Signs of wound healing will improve  2/7/2021 2125 by Carol Devi RN  Outcome: Ongoing  2/7/2021 1026 by Miki Reno RN  Outcome: Ongoing  Goal: Absence of new pressure ulcer  Description: Absence of new pressure ulcer  2/7/2021 2125 by Carol Devi RN  Outcome: Ongoing  2/7/2021 1026 by Miki Reno RN  Outcome: Ongoing  Goal: Will show no infection signs and symptoms  Description: Will show no infection signs and symptoms  2/7/2021 2125 by Carol Devi RN  Outcome: Ongoing  2/7/2021 1026 by Miki Reno RN  Outcome: Ongoing

## 2021-02-08 NOTE — PROGRESS NOTES
for Next Session:    Pt will perform functional task in stand reaching in all 3 planes to increase dynamic standing balance    Time in:  1504  Time out:  1600  Timed treatment minutes:  56 minutes  Total treatment time:  56 minutes  Electronically signed by:    Allyson ROBERTSON/L [id number]  4:02 PM,2/8/2021     Previously filed values:      Goals:  1. Pt will complete all aspects of bed mobility for EOB/OOB ADLs Joseph.   2. Pt will complete UB/LB bathing with Joseph and AE as needed. 3. Pt will complete all aspects of LB dressing with ModA and AE as needed. 4. Pt will complete all functional transfers to and from bed, chair, toilet, shower chair with CGA and AD. 5. Pt will ambulate HH distance to bathroom for toileting with CGA and AD. 6. Pt will complete all aspects of toileting task with Joseph. 7. Pt will complete oral hygiene/grooming routine in standing at sink with Joseph demo good dynamic standing balance for approx 8 minutes. 8. Pt will complete ther ex/ther act with focus on UB strengthening.

## 2021-02-08 NOTE — PROGRESS NOTES
GENERAL SURGERY PROGRESS NOTE    Debi Cooper is a 77 y.o. male with left foot infection. POD 3 from wound debridement/I&D. Subjective:  Sleeping but awakens. States pain is improved in left foot today. Objective:    Vitals: VITALS:  /69   Pulse 95   Temp 98.7 °F (37.1 °C) (Oral)   Resp 18   Ht 5' 10\" (1.778 m)   Wt 171 lb (77.6 kg)   SpO2 92%   BMI 24.54 kg/m²     I/O: No intake/output data recorded. Labs/Imaging Results:   Lab Results   Component Value Date     02/08/2021    K 4.7 02/08/2021    CL 94 02/08/2021    CO2 30 02/08/2021    BUN 10 02/08/2021    CREATININE 0.6 02/08/2021    GLUCOSE 101 02/08/2021    CALCIUM 8.0 02/08/2021      Lab Results   Component Value Date    WBC 13.1 (H) 02/08/2021    HGB 14.7 02/08/2021    HCT 45.3 02/08/2021    .1 (H) 02/08/2021     02/08/2021     Scheduled Meds:   thiamine, 100 mg, Oral, Daily    folic acid, 1 mg, Oral, Daily    potassium chloride, 20 mEq, Oral, TID WC    therapeutic multivitamin-minerals, 1 tablet, Oral, Daily    ampicillin IV, 2,000 mg, Intravenous, 6 times per day    sodium chloride flush, 10 mL, Intravenous, 2 times per day    enoxaparin, 40 mg, Subcutaneous, Daily    Physical Exam:  General: alert, no distress. HEENT: Anicteric sclerae, MMM. Extremities: chronic venous changes in the lower legs. Left foot wound ~4x3cm and 1cm deep with some undermining, minimal amount of s/s drainage on dressing but no active drainage from wound,  no foul odor. Erythema present over the dorsal foot-- continued mild improved. Abdomen: Soft, nontender    Culture Abnormal  02/05/2021  2:18 PM [facility]   BETA STREP GROUP G Light growth Susceptibility testing of penicillin and other beta lactams is not necessary for beta hemolytic Streptococci since resistant strains have not been identified. (CLSI M100)       Assessment and Plan:  77 y.o. male s/p left foot debridement.   Dressing changed at

## 2021-02-09 ENCOUNTER — APPOINTMENT (OUTPATIENT)
Dept: MRI IMAGING | Age: 76
DRG: 500 | End: 2021-02-09
Payer: MEDICARE

## 2021-02-09 ENCOUNTER — ANESTHESIA EVENT (OUTPATIENT)
Dept: OPERATING ROOM | Age: 76
DRG: 500 | End: 2021-02-09
Payer: MEDICARE

## 2021-02-09 ENCOUNTER — ANESTHESIA (OUTPATIENT)
Dept: OPERATING ROOM | Age: 76
DRG: 500 | End: 2021-02-09
Payer: MEDICARE

## 2021-02-09 VITALS
TEMPERATURE: 97 F | SYSTOLIC BLOOD PRESSURE: 105 MMHG | RESPIRATION RATE: 14 BRPM | DIASTOLIC BLOOD PRESSURE: 70 MMHG | OXYGEN SATURATION: 100 %

## 2021-02-09 LAB
ANION GAP SERPL CALCULATED.3IONS-SCNC: 12 MMOL/L (ref 4–16)
BASOPHILS ABSOLUTE: 0.1 K/CU MM
BASOPHILS RELATIVE PERCENT: 0.4 % (ref 0–1)
BUN BLDV-MCNC: 9 MG/DL (ref 6–23)
CALCIUM SERPL-MCNC: 8.2 MG/DL (ref 8.3–10.6)
CHLORIDE BLD-SCNC: 92 MMOL/L (ref 99–110)
CO2: 27 MMOL/L (ref 21–32)
CREAT SERPL-MCNC: 0.6 MG/DL (ref 0.9–1.3)
CULTURE: ABNORMAL
DIFFERENTIAL TYPE: ABNORMAL
EOSINOPHILS ABSOLUTE: 0 K/CU MM
EOSINOPHILS RELATIVE PERCENT: 0.1 % (ref 0–3)
FLUID TYPE: NORMAL INDEX
GFR AFRICAN AMERICAN: >60 ML/MIN/1.73M2
GFR NON-AFRICAN AMERICAN: >60 ML/MIN/1.73M2
GLUCOSE BLD-MCNC: 103 MG/DL (ref 70–99)
HCT VFR BLD CALC: 45 % (ref 42–52)
HEMOGLOBIN: 14.9 GM/DL (ref 13.5–18)
IMMATURE NEUTROPHIL %: 1.2 % (ref 0–0.43)
LYMPHOCYTES ABSOLUTE: 1.2 K/CU MM
LYMPHOCYTES RELATIVE PERCENT: 8.4 % (ref 24–44)
LYMPHOCYTES, BODY FLUID: 1 %
Lab: ABNORMAL
Lab: ABNORMAL
MCH RBC QN AUTO: 34.3 PG (ref 27–31)
MCHC RBC AUTO-ENTMCNC: 33.1 % (ref 32–36)
MCV RBC AUTO: 103.7 FL (ref 78–100)
MONOCYTE, FLUID: 11 %
MONOCYTES ABSOLUTE: 0.8 K/CU MM
MONOCYTES RELATIVE PERCENT: 6 % (ref 0–4)
NEUTROPHIL, FLUID: 88 %
NUCLEATED RBC %: 0 %
OTHER CELLS FLUID: NORMAL
PDW BLD-RTO: 11.9 % (ref 11.7–14.9)
PLATELET # BLD: 421 K/CU MM (ref 140–440)
PMV BLD AUTO: 10 FL (ref 7.5–11.1)
POTASSIUM SERPL-SCNC: 4.8 MMOL/L (ref 3.5–5.1)
PROCALCITONIN: 0.18
RBC # BLD: 4.34 M/CU MM (ref 4.6–6.2)
RBC FLUID: NORMAL /CU MM
SEGMENTED NEUTROPHILS ABSOLUTE COUNT: 11.6 K/CU MM
SEGMENTED NEUTROPHILS RELATIVE PERCENT: 83.9 % (ref 36–66)
SODIUM BLD-SCNC: 131 MMOL/L (ref 135–145)
SPECIMEN: ABNORMAL
SPECIMEN: ABNORMAL
TOTAL IMMATURE NEUTOROPHIL: 0.17 K/CU MM
TOTAL NUCLEATED RBC: 0 K/CU MM
WBC # BLD: 13.8 K/CU MM (ref 4–10.5)
WBC FLUID: NORMAL /CU MM

## 2021-02-09 PROCEDURE — 85025 COMPLETE CBC W/AUTO DIFF WBC: CPT

## 2021-02-09 PROCEDURE — 3600000014 HC SURGERY LEVEL 4 ADDTL 15MIN: Performed by: ORTHOPAEDIC SURGERY

## 2021-02-09 PROCEDURE — 3600000004 HC SURGERY LEVEL 4 BASE: Performed by: ORTHOPAEDIC SURGERY

## 2021-02-09 PROCEDURE — 87077 CULTURE AEROBIC IDENTIFY: CPT

## 2021-02-09 PROCEDURE — 2580000003 HC RX 258: Performed by: ORTHOPAEDIC SURGERY

## 2021-02-09 PROCEDURE — 3700000000 HC ANESTHESIA ATTENDED CARE: Performed by: ORTHOPAEDIC SURGERY

## 2021-02-09 PROCEDURE — 2709999900 HC NON-CHARGEABLE SUPPLY: Performed by: ORTHOPAEDIC SURGERY

## 2021-02-09 PROCEDURE — 1200000000 HC SEMI PRIVATE

## 2021-02-09 PROCEDURE — 99024 POSTOP FOLLOW-UP VISIT: CPT | Performed by: SURGERY

## 2021-02-09 PROCEDURE — 87070 CULTURE OTHR SPECIMN AEROBIC: CPT

## 2021-02-09 PROCEDURE — 6360000002 HC RX W HCPCS: Performed by: SURGERY

## 2021-02-09 PROCEDURE — 6370000000 HC RX 637 (ALT 250 FOR IP): Performed by: ORTHOPAEDIC SURGERY

## 2021-02-09 PROCEDURE — 99233 SBSQ HOSP IP/OBS HIGH 50: CPT | Performed by: INTERNAL MEDICINE

## 2021-02-09 PROCEDURE — 2500000003 HC RX 250 WO HCPCS

## 2021-02-09 PROCEDURE — 99232 SBSQ HOSP IP/OBS MODERATE 35: CPT | Performed by: ORTHOPAEDIC SURGERY

## 2021-02-09 PROCEDURE — 6370000000 HC RX 637 (ALT 250 FOR IP): Performed by: INTERNAL MEDICINE

## 2021-02-09 PROCEDURE — 80048 BASIC METABOLIC PNL TOTAL CA: CPT

## 2021-02-09 PROCEDURE — 6360000002 HC RX W HCPCS: Performed by: INTERNAL MEDICINE

## 2021-02-09 PROCEDURE — 84145 PROCALCITONIN (PCT): CPT

## 2021-02-09 PROCEDURE — 94761 N-INVAS EAR/PLS OXIMETRY MLT: CPT

## 2021-02-09 PROCEDURE — 2580000003 HC RX 258: Performed by: INTERNAL MEDICINE

## 2021-02-09 PROCEDURE — 87075 CULTR BACTERIA EXCEPT BLOOD: CPT

## 2021-02-09 PROCEDURE — 6360000002 HC RX W HCPCS

## 2021-02-09 PROCEDURE — 2580000003 HC RX 258: Performed by: ANESTHESIOLOGY

## 2021-02-09 PROCEDURE — 3700000001 HC ADD 15 MINUTES (ANESTHESIA): Performed by: ORTHOPAEDIC SURGERY

## 2021-02-09 PROCEDURE — 7100000000 HC PACU RECOVERY - FIRST 15 MIN: Performed by: ORTHOPAEDIC SURGERY

## 2021-02-09 PROCEDURE — 6360000002 HC RX W HCPCS: Performed by: ORTHOPAEDIC SURGERY

## 2021-02-09 PROCEDURE — 7100000001 HC PACU RECOVERY - ADDTL 15 MIN: Performed by: ORTHOPAEDIC SURGERY

## 2021-02-09 PROCEDURE — 2580000003 HC RX 258: Performed by: SURGERY

## 2021-02-09 PROCEDURE — 89051 BODY FLUID CELL COUNT: CPT

## 2021-02-09 PROCEDURE — 36415 COLL VENOUS BLD VENIPUNCTURE: CPT

## 2021-02-09 PROCEDURE — 99213 OFFICE O/P EST LOW 20 MIN: CPT

## 2021-02-09 PROCEDURE — 87205 SMEAR GRAM STAIN: CPT

## 2021-02-09 RX ORDER — ONDANSETRON 2 MG/ML
4 INJECTION INTRAMUSCULAR; INTRAVENOUS
Status: DISCONTINUED | OUTPATIENT
Start: 2021-02-09 | End: 2021-02-09 | Stop reason: HOSPADM

## 2021-02-09 RX ORDER — HYDRALAZINE HYDROCHLORIDE 20 MG/ML
5 INJECTION INTRAMUSCULAR; INTRAVENOUS EVERY 10 MIN PRN
Status: DISCONTINUED | OUTPATIENT
Start: 2021-02-09 | End: 2021-02-09 | Stop reason: HOSPADM

## 2021-02-09 RX ORDER — FENTANYL CITRATE 50 UG/ML
25 INJECTION, SOLUTION INTRAMUSCULAR; INTRAVENOUS EVERY 5 MIN PRN
Status: DISCONTINUED | OUTPATIENT
Start: 2021-02-09 | End: 2021-02-09 | Stop reason: HOSPADM

## 2021-02-09 RX ORDER — HYDROMORPHONE HCL 110MG/55ML
0.5 PATIENT CONTROLLED ANALGESIA SYRINGE INTRAVENOUS EVERY 5 MIN PRN
Status: DISCONTINUED | OUTPATIENT
Start: 2021-02-09 | End: 2021-02-09 | Stop reason: HOSPADM

## 2021-02-09 RX ORDER — HYDROMORPHONE HCL 110MG/55ML
0.25 PATIENT CONTROLLED ANALGESIA SYRINGE INTRAVENOUS EVERY 5 MIN PRN
Status: DISCONTINUED | OUTPATIENT
Start: 2021-02-09 | End: 2021-02-09 | Stop reason: HOSPADM

## 2021-02-09 RX ORDER — PHENYLEPHRINE HCL IN 0.9% NACL 1 MG/10 ML
SYRINGE (ML) INTRAVENOUS PRN
Status: DISCONTINUED | OUTPATIENT
Start: 2021-02-09 | End: 2021-02-09 | Stop reason: SDUPTHER

## 2021-02-09 RX ORDER — PROPOFOL 10 MG/ML
INJECTION, EMULSION INTRAVENOUS PRN
Status: DISCONTINUED | OUTPATIENT
Start: 2021-02-09 | End: 2021-02-09 | Stop reason: SDUPTHER

## 2021-02-09 RX ORDER — FENTANYL CITRATE 50 UG/ML
INJECTION, SOLUTION INTRAMUSCULAR; INTRAVENOUS PRN
Status: DISCONTINUED | OUTPATIENT
Start: 2021-02-09 | End: 2021-02-09 | Stop reason: SDUPTHER

## 2021-02-09 RX ORDER — LABETALOL HYDROCHLORIDE 5 MG/ML
5 INJECTION, SOLUTION INTRAVENOUS EVERY 10 MIN PRN
Status: DISCONTINUED | OUTPATIENT
Start: 2021-02-09 | End: 2021-02-09 | Stop reason: HOSPADM

## 2021-02-09 RX ORDER — ONDANSETRON 2 MG/ML
INJECTION INTRAMUSCULAR; INTRAVENOUS PRN
Status: DISCONTINUED | OUTPATIENT
Start: 2021-02-09 | End: 2021-02-09 | Stop reason: SDUPTHER

## 2021-02-09 RX ORDER — LIDOCAINE HYDROCHLORIDE 20 MG/ML
INJECTION, SOLUTION EPIDURAL; INFILTRATION; INTRACAUDAL; PERINEURAL PRN
Status: DISCONTINUED | OUTPATIENT
Start: 2021-02-09 | End: 2021-02-09 | Stop reason: SDUPTHER

## 2021-02-09 RX ORDER — SODIUM CHLORIDE, SODIUM LACTATE, POTASSIUM CHLORIDE, CALCIUM CHLORIDE 600; 310; 30; 20 MG/100ML; MG/100ML; MG/100ML; MG/100ML
INJECTION, SOLUTION INTRAVENOUS ONCE
Status: COMPLETED | OUTPATIENT
Start: 2021-02-09 | End: 2021-02-09

## 2021-02-09 RX ORDER — DEXAMETHASONE SODIUM PHOSPHATE 4 MG/ML
INJECTION, SOLUTION INTRA-ARTICULAR; INTRALESIONAL; INTRAMUSCULAR; INTRAVENOUS; SOFT TISSUE PRN
Status: DISCONTINUED | OUTPATIENT
Start: 2021-02-09 | End: 2021-02-09 | Stop reason: SDUPTHER

## 2021-02-09 RX ORDER — MORPHINE SULFATE 2 MG/ML
2 INJECTION, SOLUTION INTRAMUSCULAR; INTRAVENOUS EVERY 5 MIN PRN
Status: DISCONTINUED | OUTPATIENT
Start: 2021-02-09 | End: 2021-02-09 | Stop reason: HOSPADM

## 2021-02-09 RX ADMIN — SODIUM CHLORIDE, POTASSIUM CHLORIDE, SODIUM LACTATE AND CALCIUM CHLORIDE: 600; 310; 30; 20 INJECTION, SOLUTION INTRAVENOUS at 15:15

## 2021-02-09 RX ADMIN — SODIUM CHLORIDE, PRESERVATIVE FREE 10 ML: 5 INJECTION INTRAVENOUS at 09:09

## 2021-02-09 RX ADMIN — PROPOFOL 30 MG: 10 INJECTION, EMULSION INTRAVENOUS at 14:07

## 2021-02-09 RX ADMIN — SODIUM CHLORIDE, PRESERVATIVE FREE 10 ML: 5 INJECTION INTRAVENOUS at 21:52

## 2021-02-09 RX ADMIN — ONDANSETRON 4 MG: 2 INJECTION INTRAMUSCULAR; INTRAVENOUS at 14:06

## 2021-02-09 RX ADMIN — LIDOCAINE HYDROCHLORIDE 100 MG: 20 INJECTION, SOLUTION EPIDURAL; INFILTRATION; INTRACAUDAL; PERINEURAL at 14:06

## 2021-02-09 RX ADMIN — FENTANYL CITRATE 25 MCG: 50 INJECTION INTRAMUSCULAR; INTRAVENOUS at 15:07

## 2021-02-09 RX ADMIN — FOLIC ACID 1 MG: 1 TABLET ORAL at 16:51

## 2021-02-09 RX ADMIN — MORPHINE SULFATE 2 MG: 2 INJECTION, SOLUTION INTRAMUSCULAR; INTRAVENOUS at 09:09

## 2021-02-09 RX ADMIN — VANCOMYCIN HYDROCHLORIDE 1500 MG: 5 INJECTION, POWDER, LYOPHILIZED, FOR SOLUTION INTRAVENOUS at 11:53

## 2021-02-09 RX ADMIN — AMPICILLIN SODIUM 2000 MG: 2 INJECTION, POWDER, FOR SOLUTION INTRAVENOUS at 16:51

## 2021-02-09 RX ADMIN — AMPICILLIN SODIUM 2000 MG: 2 INJECTION, POWDER, FOR SOLUTION INTRAVENOUS at 21:51

## 2021-02-09 RX ADMIN — PREDNISONE 40 MG: 20 TABLET ORAL at 16:51

## 2021-02-09 RX ADMIN — Medication 100 MCG: at 14:21

## 2021-02-09 RX ADMIN — MULTIPLE VITAMINS W/ MINERALS TAB 1 TABLET: TAB at 16:51

## 2021-02-09 RX ADMIN — AMPICILLIN SODIUM 2000 MG: 2 INJECTION, POWDER, FOR SOLUTION INTRAVENOUS at 04:51

## 2021-02-09 RX ADMIN — FENTANYL CITRATE 50 MCG: 50 INJECTION INTRAMUSCULAR; INTRAVENOUS at 14:06

## 2021-02-09 RX ADMIN — PROPOFOL 60 MG: 10 INJECTION, EMULSION INTRAVENOUS at 14:06

## 2021-02-09 RX ADMIN — Medication 100 MG: at 16:51

## 2021-02-09 RX ADMIN — Medication 100 MCG: at 14:11

## 2021-02-09 RX ADMIN — Medication 100 MCG: at 14:42

## 2021-02-09 RX ADMIN — Medication 100 MCG: at 14:36

## 2021-02-09 RX ADMIN — SODIUM CHLORIDE, POTASSIUM CHLORIDE, SODIUM LACTATE AND CALCIUM CHLORIDE: 600; 310; 30; 20 INJECTION, SOLUTION INTRAVENOUS at 13:58

## 2021-02-09 RX ADMIN — FENTANYL CITRATE 25 MCG: 50 INJECTION INTRAMUSCULAR; INTRAVENOUS at 14:28

## 2021-02-09 RX ADMIN — Medication 100 MCG: at 14:51

## 2021-02-09 RX ADMIN — AMPICILLIN SODIUM 2000 MG: 2 INJECTION, POWDER, FOR SOLUTION INTRAVENOUS at 00:42

## 2021-02-09 RX ADMIN — DEXAMETHASONE SODIUM PHOSPHATE 4 MG: 4 INJECTION, SOLUTION INTRAMUSCULAR; INTRAVENOUS at 14:06

## 2021-02-09 RX ADMIN — AMPICILLIN SODIUM 2000 MG: 2 INJECTION, POWDER, FOR SOLUTION INTRAVENOUS at 11:16

## 2021-02-09 ASSESSMENT — PULMONARY FUNCTION TESTS
PIF_VALUE: 0
PIF_VALUE: 4
PIF_VALUE: 8
PIF_VALUE: 1
PIF_VALUE: 3
PIF_VALUE: 4
PIF_VALUE: 4
PIF_VALUE: 3
PIF_VALUE: 6
PIF_VALUE: 1
PIF_VALUE: 4
PIF_VALUE: 1
PIF_VALUE: 8
PIF_VALUE: 3
PIF_VALUE: 4
PIF_VALUE: 2
PIF_VALUE: 4
PIF_VALUE: 1
PIF_VALUE: 1
PIF_VALUE: 3
PIF_VALUE: 5
PIF_VALUE: 4
PIF_VALUE: 3
PIF_VALUE: 4
PIF_VALUE: 3
PIF_VALUE: 4
PIF_VALUE: 6
PIF_VALUE: 3
PIF_VALUE: 3
PIF_VALUE: 6
PIF_VALUE: 4
PIF_VALUE: 0
PIF_VALUE: 4
PIF_VALUE: 8
PIF_VALUE: 3
PIF_VALUE: 4
PIF_VALUE: 13
PIF_VALUE: 4
PIF_VALUE: 3
PIF_VALUE: 3
PIF_VALUE: 9
PIF_VALUE: 3
PIF_VALUE: 4

## 2021-02-09 ASSESSMENT — PAIN DESCRIPTION - PROGRESSION: CLINICAL_PROGRESSION: NOT CHANGED

## 2021-02-09 ASSESSMENT — PAIN SCALES - GENERAL
PAINLEVEL_OUTOF10: 5
PAINLEVEL_OUTOF10: 6

## 2021-02-09 ASSESSMENT — PAIN DESCRIPTION - LOCATION: LOCATION: HAND;WRIST

## 2021-02-09 ASSESSMENT — PAIN - FUNCTIONAL ASSESSMENT
PAIN_FUNCTIONAL_ASSESSMENT: PREVENTS OR INTERFERES SOME ACTIVE ACTIVITIES AND ADLS
PAIN_FUNCTIONAL_ASSESSMENT: 0-10

## 2021-02-09 ASSESSMENT — LIFESTYLE VARIABLES: SMOKING_STATUS: 1

## 2021-02-09 ASSESSMENT — PAIN DESCRIPTION - PAIN TYPE: TYPE: ACUTE PAIN

## 2021-02-09 NOTE — BRIEF OP NOTE
Brief Postoperative Note      Patient: Carol Jasso  YOB: 1945  MRN: [id number]    Date of Procedure: 2/9/2021    Pre-Op Diagnosis: left wrist infection    Post-Op Diagnosis: same, EPL rupture traumatic vs infectious       Procedure(s):  LEFT WRIST IRRIGATION AND DEBRIDEMENT, EXAM UNDER FLUOROSCOPY    Surgeon(s):  Rolf Leahy MD    Assistant:  None    Anesthesia: General    Estimated Blood Loss 20    Complications: none    Specimens:   ID Type Source Tests Collected by Time Destination   1 : left wrist fliud  Joint/Joint Fluid Joint Fluid CSF CELL COUNT WITH DIFFERENTIAL, GRAM STAIN, CULTURE, BODY FLUID Rolf Leahy MD 2/9/2021 1445    2 : wrist  Tissue Tissue CULTURE, TISSUE Rolf Leahy MD 2/9/2021 1504        Implants:  none    Drains: none    Findings: 1-2 cc of joint fluid murky and bloody- sent for cx and cell ct  Fluoro, confirmed asp of joint, proximal migration of capitate, fragmented scaphoid prox pole, lunate fragmented remains.      Electronically signed by Geni Jacobs MD on 2/9/2021 at 3:26 PM

## 2021-02-09 NOTE — PROGRESS NOTES
Hospitalist Progress Note      Name:  Jorge Mendez /Age/Sex: 1945  (77 y.o. male)   MRN & CSN:  [id number] & [id number] Admission Date/Time: 2021  2:05 PM   Location:  OR/NONE PCP: No primary care provider on file. Hospital Day: 9    Assessment and Plan:   Jorge Mendez is a 77 y.o.  male  who presents with Rhabdomyolysis    Nontraumatic Rhadomyolysis : Resolved  Mechanical fall at home  Due to Prolonged immobilization after a fall. No associated CISCO. CK trending down   IVF discontinued. PT/OT-recommend SNF. approved     Left Triquetral fracture (wrist bone), likely from fall on outstretched arm. Possible left wrist infection   S/P wrist aspiration  OR today      Beta Streptococcus Group G bacteremia with sepsis(1/4 bottles)  Left foot abscess (present on admission)  Located around the Right hallux base. No evidence of osteomyelitis on Xray. S/p I&D on 2/3/21 by Dr. Tia Muniz at the bedside  S/p I&D on 21 in OR - 4 cc purulent material expressed - -culture beta strep  ID on board:IV ampicillin for 2 weeks following negative blood culture   Vancomycin started. General surgery on board      Hypokalemia  Replace and follow BMP     Alcohol use disorder. Serum alcohol not elevated.    No withdrawal symptoms at this time. Continue folate and thiamine.      Incidental Right Adrenal nodule - Repeat imaging in 1 year. PCP to f/u. Abnormal LFTs - from rhabdomyloysis. Improving. Antibiotic mgt as above. Constipation - CT abd/pel shows stools in rectal vault. Plan: Miralax PRN    Diet Diet NPO, After Midnight   DVT Prophylaxis [x] Lovenox, []  Heparin, [] SCDs, [] Warfarin  [] NOAC     GI Prophylaxis [] PPI,  [] H2 Blocker,  [] Carafate,  [x] Diet/Tube Feeds   Code Status Full Code   MDM [] Low, [x] Moderate,[]  High     History of Present Illness:     Chief Complaint: Rhabdomyolysis    Seen and examined today. Has persistent swelling of left hand and left arm.     Ten point ROS reviewed negative, unless as noted above    Objective: Intake/Output Summary (Last 24 hours) at 2/9/2021 1259  Last data filed at 2/9/2021 1224  Gross per 24 hour   Intake --   Output 50 ml   Net -50 ml      Vitals:   Vitals:    02/09/21 1212   BP: 116/60   Pulse: 92   Resp: 16   Temp: 98.8 °F (37.1 °C)   SpO2: 94%     Physical Exam:   GEN Awake male, sitting upright in bed in no apparent distress. Appears given age. EYES Pupils are equally round. No scleral erythema, discharge, or conjunctivitis. HENT Mucous membranes are moist. Oral pharynx without exudates, no evidence of thrush. NECK Supple, no apparent thyromegaly or masses. RESP Clear to auscultation, no wheezes, rales or rhonchi. Symmetric chest movement while on room air. CARDIO/VASC S1/S2 auscultated. Regular rate without appreciable murmurs, rubs, or gallops. No JVD or carotid bruits. Peripheral pulses equal bilaterally and palpable. No peripheral edema. GI Abdomen is soft without significant tenderness, masses, or guarding. Bowel sounds are normoactive. Rectal exam deferred. MSK left hand with swelling, left foot with dressing. SKIN Normal coloration, warm, dry. NEURO Cranial nerves appear grossly intact, normal speech, no lateralizing weakness. PSYCH Awake, alert, oriented x 4. Affect appropriate.     Medications:   Medications:    vancomycin  1,500 mg Intravenous Q12H    predniSONE  40 mg Oral Daily    thiamine  100 mg Oral Daily    folic acid  1 mg Oral Daily    therapeutic multivitamin-minerals  1 tablet Oral Daily    ampicillin IV  2,000 mg Intravenous 6 times per day    sodium chloride flush  10 mL Intravenous 2 times per day      Infusions:    lactated ringers       PRN Meds:     melatonin, 9 mg, Nightly PRN      morphine, 2 mg, Q2H PRN    Or      morphine, 4 mg, Q2H PRN      sodium chloride flush, 10 mL, PRN      promethazine, 12.5 mg, Q6H PRN    Or      ondansetron, 4 mg, Q6H PRN      polyethylene glycol, 17 g, Daily PRN      acetaminophen, 650 mg, Q6H PRN    Or      acetaminophen, 650 mg, Q6H PRN      Recent Labs     02/07/21  0349 02/08/21  0550 02/09/21  0540   WBC 14.8* 13.1* 13.8*   HGB 14.1 14.7 14.9   HCT 43.3 45.3 45.0    338 421      Recent Labs     02/07/21  0349 02/08/21  0550 02/09/21  0540   * 133* 131*   K 4.0 4.7 4.8   CL 97* 94* 92*   CO2 27 30 27   BUN 10 10 9   CREATININE 0.6* 0.6* 0.6*     Imaging reviewed    Electronically signed by Chester Willis MD on 2/9/2021 at 12:59 PM

## 2021-02-09 NOTE — PROGRESS NOTES
PT seen and examined  HE refused mri with techs, indicated he just wasn't going to cont to hold still, no real pain issues. Pt refused further testing   Pt is a difficult historian as to events leading up to L wrist pain and swelling other than he just fell  He says wrist was fine before fall and he could lift a gallon of milk. His XR / CT show chronic SLAC changes with migration of capitate in to lunate fossa and disintegration of proximal scaphoid, DRUJ aligns well on CT. Discussed possibility of acute trauma on top of chronic changes and diagnostic challenges with deformed anatomy. , possib need for further intervention. Disc with ID there is concern for septic wrist with + cx Gr G strep though fluid was minimal  He is developing skin wrinkles and may have dec swelling some  He demonstrates 30 deg PF and 20 deg of DF freely, some pain with pronation/supination at wrist, some crepitus with rom passive  Arm compartments all soft and compressible  No knavel signs  Mod hand swelling but not tender in thenar or hypothenar areas  Digits stiff. SILT ulnar, med less,radial good. Can flx ext and abd digits over small arc  He has a dependent rubor but no bright streaking or erythema seen with cellulitis  WBC slight elev, AF  With + cx ID in favor of exploration of wrist joint, possibly seeded from infected toe, also possibility for swelling from washing for joint aspiration as they has little return. Disc risks with pt of proceeding vs not. Pt with odd behavior (stripping clothes and cussing, thinks we are at [facility]) spotty history but know presidents. Unable to contact any family, in light of possibility for infection surgical exploration recommend as emergency. R/B/d/w patient and he is agreeable to proceed. Site marked

## 2021-02-09 NOTE — PROGRESS NOTES
swelling along the dorsum of the foot is also noted. Vascular   calcifications. Calcaneal spur along the plantar aspect. 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 Degenerative changes are also noted in the midfoot. Otherwise no acute osseous abnormality.         Labs:    Recent Results (from the past 24 hour(s))   CBC auto differential    Collection Time: 02/09/21  5:40 AM   Result Value Ref Range    WBC 13.8 (H) 4.0 - 10.5 K/CU MM    RBC 4.34 (L) 4.6 - 6.2 M/CU MM    Hemoglobin 14.9 13.5 - 18.0 GM/DL    Hematocrit 45.0 42 - 52 %    .7 (H) 78 - 100 FL    MCH 34.3 (H) 27 - 31 PG    MCHC 33.1 32.0 - 36.0 %    RDW 11.9 11.7 - 14.9 %    Platelets 243 134 - 973 K/CU MM    MPV 10.0 7.5 - 11.1 FL    Differential Type AUTOMATED DIFFERENTIAL     Segs Relative 83.9 (H) 36 - 66 %    Lymphocytes % 8.4 (L) 24 - 44 %    Monocytes % 6.0 (H) 0 - 4 %    Eosinophils % 0.1 0 - 3 %    Basophils % 0.4 0 - 1 %    Segs Absolute 11.6 K/CU MM    Lymphocytes Absolute 1.2 K/CU MM    Monocytes Absolute 0.8 K/CU MM    Eosinophils Absolute 0.0 K/CU MM    Basophils Absolute 0.1 K/CU MM    Nucleated RBC % 0.0 %    Total Nucleated RBC 0.0 K/CU MM    Total Immature Neutrophil 0.17 K/CU MM    Immature Neutrophil % 1.2 (H) 0 - 0.43 %   Procalcitonin    Collection Time: 02/09/21  5:40 AM   Result Value Ref Range    Procalcitonin 7.004    Basic metabolic panel    Collection Time: 02/09/21  5:40 AM   Result Value Ref Range    Sodium 131 (L) 135 - 145 MMOL/L    Potassium 4.8 3.5 - 5.1 MMOL/L    Chloride 92 (L) 99 - 110 mMol/L    CO2 27 21 - 32 MMOL/L    Anion Gap 12 4 - 16    BUN 9 6 - 23 MG/DL    CREATININE 0.6 (L) 0.9 - 1.3 MG/DL    Glucose 103 (H) 70 - 99 MG/DL    Calcium 8.2 (L) 8.3 - 10.6 MG/DL    GFR Non-African American >60 >60 mL/min/1.73m2    GFR African American >60 >60 mL/min/1.73m2     CULTURE results: Invalid input(s): BLOOD CULTURE,  URINE CULTURE, SURGICAL CULTURE    Diagnosis:  Patient Active Problem List   Diagnosis    Rhabdomyolysis    Foot abscess, left    Bacteremia due to group B Streptococcus    Sepsis ([facility] [address]       Active Problems  Principal Problem:    Rhabdomyolysis  Active Problems:    Foot abscess, left    Bacteremia due to group B Streptococcus    Sepsis ([facility] [address]  Resolved Problems:    * No resolved hospital problems. *      Impression and plan   Summary and rationale: Patient is a 77 y.o.  male admitted after a fall at home, was on the floor possibly for 2 days. Had nontraumatic rhabdomyolysis. And the chronic left foot ulcer catheter had an abscess. Chronic left wrist pain, joint aspiration did not yield appreciable fluid. Diagnosed with Group G Streptococcus bacteremia. Source is left hallux abscess as the culture is also positive for group B streptococcus. Initially had a bedside I&D, however, repeat I&D was performed on 2/5/2021 in the OR. 4 mL of pus was removed and loculations were broken up with a forceps. Uric acid level 2.9 mg/dL   Clinical status: Afebrile, leukocytosis persists, but procalcitonin is on a downward trend. .  Blood cultures from 2/5, 0/2 NGTD. Some improvement in left wrist swelling, unclear if this is due to prednisone. Discussed with Dr. Fiordaliza Phelps, agree with plans for MRI of the wrist and forearm. However, patient could not tolerate laying flat. Left foot wound site appears mucoid drainage? MRSA.    Therapeutic: Ampicillin 2/4-, restart vancomycin on 2/9-   Completed antibiotics Zosyn 2/3-4, vancomycin 2/3-4   Diagnostic: Wound culture (2/9/2021)   F/u:  Desiree Bah Other:.        Electronically signed by: Electronically signed by Myrna Morales MD on 2/9/2021 at 10:12 AM

## 2021-02-09 NOTE — PROGRESS NOTES
Got the patient down for the MRI of the wrist and forearm but the patient was unable to tolerate laying down and having arm in one position. The patient kept on telling us to hurry up and flat out refused to continue going after we finished 1 sequence. Called Dr Mike Ellis and he is aware that the patient is not able to tolerate MRI even after being given morphine and said we could cancel order.

## 2021-02-09 NOTE — PROGRESS NOTES
Physical Therapy  Pt in OR for left wrist irrigation and debridement. Will re-attempt tomorrow for PT session.

## 2021-02-09 NOTE — CONSULTS
[facility] Continence Nurse  Consult Note       Pradeep Boone  AGE: 77 y.o. GENDER: male  : 1945  TODAY'S DATE:  2021    Subjective:     Reason for  Evaluation and Assessment:  Wound care assessment      Pradeep Boone is a 77 y.o. male referred by:   [x] Physician  [] Nursing  [] Other:     Wound Identification:  Wound Type: pressure and non-healing surgical  Contributing Factors: chronic pressure, decreased mobility and incontinence of stool        PAST MEDICAL HISTORY        Diagnosis Date    Alcohol abuse     Fall in elderly patient     Smoking history     Tobacco abuse        PAST SURGICAL HISTORY    Past Surgical History:   Procedure Laterality Date    FOOT DEBRIDEMENT Left 2021    FOOT DEBRIDEMENT INCISION AND DRAINAGE performed by Mehrdad Cordero MD at [address] [place] [address]    History reviewed. No pertinent family history. SOCIAL HISTORY    Social History     Tobacco Use    Smoking status: Current Every Day Smoker     Packs/day: 1.00    Smokeless tobacco: Never Used   Substance Use Topics    Alcohol use: Not on file    Drug use: Not on file       ALLERGIES    No Known Allergies    MEDICATIONS    No current facility-administered medications on file prior to encounter. No current outpatient medications on file prior to encounter.          Objective:      /60   Pulse 92   Temp 98.8 °F (37.1 °C) (Temporal)   Resp 16   Ht 5' 10\" (1.778 m)   Wt 282 lb 4.8 oz (128.1 kg)   SpO2 94%   BMI 40.51 kg/m²   Pee Risk Score: Pee Scale Score: 18    LABS    CBC:   Lab Results   Component Value Date    WBC 13.8 2021    RBC 4.34 2021    HGB 14.9 2021    HCT 45.0 2021    .7 2021    MCH 34.3 2021    MCHC 33.1 2021    RDW 11.9 2021     2021    MPV 10.0 2021     CMP:    Lab Results   Component Value Date     2021    K 4.8 2021    CL 92 2021    CO2 27 2021 BUN 9 02/09/2021    CREATININE 0.6 02/09/2021    GFRAA >60 02/09/2021    LABGLOM >60 02/09/2021    GLUCOSE 103 02/09/2021    PROT 5.1 02/02/2021    LABALBU 2.7 02/02/2021    CALCIUM 8.2 02/09/2021    BILITOT 1.0 02/02/2021    ALKPHOS 70 02/02/2021    AST 49 02/02/2021    ALT 32 02/02/2021     Albumin:    Lab Results   Component Value Date    LABALBU 2.7 02/02/2021     PT/INR:    Lab Results   Component Value Date    PROTIME 16.1 02/01/2021    INR 1.33 02/01/2021     HgBA1c:  No results found for: LABA1C      Assessment:     Patient Active Problem List   Diagnosis    Rhabdomyolysis    Foot abscess, left    Bacteremia due to group B Streptococcus    Sepsis ([facility] [address]       Measurements:  Wound 02/02/21 Foot Left;Dorsal (Active)   Wound Etiology Surgical 02/09/21 1100   Dressing Status New dressing applied 02/09/21 1100   Wound Cleansed Cleansed with saline 02/09/21 1100   Dressing/Treatment ABD;Dry dressing; Other (comment) 02/07/21 2015   Dressing Change Due 02/05/21 02/07/21 2015   Wound Length (cm) 2 cm 02/09/21 1100   Wound Width (cm) 3.8 cm 02/09/21 1100   Wound Depth (cm) 1.7 cm 02/09/21 1100   Wound Surface Area (cm^2) 7.6 cm^2 02/09/21 1100   Change in Wound Size % (l*w) -985.71 02/09/21 1100   Wound Volume (cm^3) 12.92 cm^3 02/09/21 1100   Wound Healing % -30987 02/09/21 1100   Distance Tunneling (cm) 0 cm 02/09/21 1100   Tunneling Position ___ O'Clock 0 02/09/21 1100   Undermining Starts ___ O'Clock 12 02/09/21 1100   Undermining Ends___ O'Clock 5 02/09/21 1100   Undermining Maxium Distance (cm) 1.4 02/09/21 1100   Wound Assessment Pink/red 02/09/21 1100   Drainage Amount Moderate 02/09/21 1100   Drainage Description Serosanguinous 02/09/21 1100   Odor None 02/09/21 1100   Leyla-wound Assessment Dry/flaky; Excoriated;Fragile; Warm 02/07/21 2015   Margins Defined edges 02/09/21 1100   Wound Thickness Description not for Pressure Injury Full thickness 02/09/21 1100   Number of days: 7       Wound 02/02/21 Sacrum Left (Active)   Wound Etiology Pressure Stage  2 02/09/21 1100   Dressing Status New dressing applied 02/09/21 1100   Wound Cleansed Cleansed with saline 02/09/21 1100   Dressing/Treatment Collagen 02/09/21 1100   Wound Length (cm) 3.9 cm 02/09/21 1100   Wound Width (cm) 3.5 cm 02/09/21 1100   Wound Depth (cm) 0.1 cm 02/09/21 1100   Wound Surface Area (cm^2) 13.65 cm^2 02/09/21 1100   Change in Wound Size % (l*w) -4450 02/09/21 1100   Wound Volume (cm^3) 1.36 cm^3 02/09/21 1100   Wound Healing % -4433 02/09/21 1100   Distance Tunneling (cm) 0 cm 02/09/21 1100   Tunneling Position ___ O'Clock 0 02/09/21 1100   Undermining Starts ___ O'Clock 0 02/09/21 1100   Undermining Ends___ O'Clock 0 02/09/21 1100   Undermining Maxium Distance (cm) 0 02/09/21 1100   Wound Assessment Pink/red 02/09/21 1100   Drainage Amount Moderate 02/09/21 1100   Drainage Description Serosanguinous 02/09/21 1100   Odor None 02/09/21 1100   Leyla-wound Assessment Intact 02/09/21 1100   Margins Defined edges 02/09/21 1100   Wound Thickness Description not for Pressure Injury Full thickness 02/09/21 1100   Number of days: 6       Response to treatment:  With complaints of pain. Pain Assessment:  Severity:  moderate  Quality of pain: sore    Wound Pain Timing/Severity:  Turning and dressing change    Premedicated: no    Plan:     Plan of Care: Wound 02/02/21 Foot Left;Dorsal-Dressing/Treatment: (1/4 in iodoform abd kerlix tape ace bandage)  Wound 02/02/21 Sacrum Left-Dressing/Treatment: Collagen(sacral mepilex border)     Patient in bed agreeable to wound care assessment. Pt had abscess area to left foot that was I &d'd by dr Gaby Platt. Dressing removed cleansed with NS measured and pictured dressing as above. Heels intact. Sacrum with open area stage 2 cleansed with NS measured and pictured dressing as above. Heels floated. Leyla care completed pad changed. Pt turned to rt side. Pt is at moderate risk for skin breakdown AEB lincoln score.  Follow lincoln orders. Atmos air pump on the bed. Specialty Bed Required : yes  [] Low Air Loss   [x] Pressure Redistribution  [] Fluid Immersion  [] Bariatric  [] Total Pressure Relief  [] Other:     Discharge Plan:  Placement for patient upon discharge: tbd   Hospice Care: no  Patient appropriate for Outpatient [address]: [facility]    Patient/Caregiver Teaching:  Level of patient/caregiver understanding able to: cares explained as given. Electronically signed by Cookie Saeed RN,  on 2/9/2021 at 1:08 PM

## 2021-02-09 NOTE — ANESTHESIA POSTPROCEDURE EVALUATION
Department of Anesthesiology  Postprocedure Note    Patient: Jorge Mendez  MRN: [id number]  YOB: 1945  Date of evaluation: 2/9/2021  Time:  4:01 PM     Procedure Summary     Date: 02/09/21 Room / Location: [address] [place] [address]    Anesthesia Start: 1348 Anesthesia Stop:     Procedure: LEFT WRIST IRRIGATION AND DEBRIDEMENT, EXAM UNDER FLUOROSCOPY (Left ) Diagnosis: (left wrist infection)    Surgeons: Ofelia Slater MD Responsible Provider: Christina Monroe MD    Anesthesia Type: general ASA Status: 3          Anesthesia Type: general    Billy Phase I: Billy Score: 8    Billy Phase II:      Last vitals: Reviewed and per EMR flowsheets.        Anesthesia Post Evaluation    Patient location during evaluation: PACU  Patient participation: complete - patient participated  Level of consciousness: awake and alert  Airway patency: patent  Nausea & Vomiting: no nausea and no vomiting  Complications: no  Cardiovascular status: hemodynamically stable and blood pressure returned to baseline  Respiratory status: acceptable, spontaneous ventilation, nonlabored ventilation and face mask  Hydration status: stable

## 2021-02-09 NOTE — ANESTHESIA PRE PROCEDURE
Department of Anesthesiology  Preprocedure Note       Name:  Elton Ahmadi   Age:  77 y.o.  :  1945                                          MRN:  [id number]         Date:  2021      Surgeon: Ailyn Painter):  Lori Boyle MD    Procedure: Procedure(s):  LEFT WRIST IRRIGATION AND DEBRIDEMENT    Medications prior to admission:   Prior to Admission medications    Not on File       Current medications:    No current facility-administered medications for this visit. No current outpatient medications on file.      Facility-Administered Medications Ordered in Other Visits   Medication Dose Route Frequency Provider Last Rate Last Admin    predniSONE (DELTASONE) tablet 40 mg  40 mg Oral Daily Herbert Hunt MD   40 mg at 21 1518    thiamine tablet 100 mg  100 mg Oral Daily Mirella Tavarez MD        melatonin tablet 9 mg  9 mg Oral Nightly PRN Lawrence Jimenez PA-C   9 mg at  2367    folic acid (FOLVITE) tablet 1 mg  1 mg Oral Daily Jessica Duran MD   1 mg at 21 1010    therapeutic multivitamin-minerals 1 tablet  1 tablet Oral Daily Sofia Morton II, MD   1 tablet at 21 1010    morphine (PF) injection 2 mg  2 mg Intravenous Q2H PRN Sofia Morton II, MD   2 mg at 21 0830    Or    morphine sulfate (PF) injection 4 mg  4 mg Intravenous Q2H PRN Sofia Morton II, MD   4 mg at 21 0008    ampicillin (OMNIPEN) 2,000 mg in sodium chloride 0.9 % 100 mL IVPB  2,000 mg Intravenous 6 times per day Sofia Morton II, MD   Stopped at 21 0521    sodium chloride flush 0.9 % injection 10 mL  10 mL Intravenous 2 times per day Sofia Morton II, MD   10 mL at 21    sodium chloride flush 0.9 % injection 10 mL  10 mL Intravenous PRN Sofia Morton II, MD        promethazine (PHENERGAN) tablet 12.5 mg  12.5 mg Oral Q6H PRN Sofia Morton II, MD        Or    ondansetron [facility]) injection 4 mg  4 mg Intravenous Q6H PRN Danish Michel MD       [facility] 02/09/2021    PROT 5.1 02/02/2021    CALCIUM 8.2 02/09/2021    BILITOT 1.0 02/02/2021    ALKPHOS 70 02/02/2021    AST 49 02/02/2021    ALT 32 02/02/2021       POC Tests: No results for input(s): POCGLU, POCNA, POCK, POCCL, POCBUN, POCHEMO, POCHCT in the last 72 hours. Coags:   Lab Results   Component Value Date    PROTIME 16.1 02/01/2021    INR 1.33 02/01/2021       HCG (If Applicable): No results found for: PREGTESTUR, PREGSERUM, HCG, HCGQUANT     ABGs: No results found for: PHART, PO2ART, FOK7NTO, DYK3YAO, BEART, N8NACFZQ     Type & Screen (If Applicable):  No results found for: LABABO, LABRH    Drug/Infectious Status (If Applicable):  No results found for: HIV, HEPCAB    COVID-19 Screening (If Applicable):   Lab Results   Component Value Date    COVID19 NOT DETECTED 02/01/2021         Anesthesia Evaluation  Patient summary reviewed and Nursing notes reviewed  Airway: Mallampati: II  TM distance: >3 FB     Mouth opening: > = 3 FB Dental:          Pulmonary:normal exam    (+) current smoker                           Cardiovascular:          ECG reviewed               Beta Blocker:  Not on Beta Blocker      ROS comment: Sinus tachycardia with premature atrial complexes   Left axis deviation   Inferior infarct , age undetermined   Abnormal ECG   When compared with ECG of 01-FEB-2021 14:11,   QRS axis shifted left   Criteria for Septal infarct are no longer present   ST no longer depressed in Anterior leads   Nonspecific T wave abnormality has replaced inverted T waves in Lateral leads   Confirmed by Noelle Potts ([id number]) on 2/2/2021 7:17:13 PM   Testing Performed By         Neuro/Psych:   (+) neuromuscular disease:, psychiatric history:             ROS comment: etoh  GI/Hepatic/Renal:   (+) renal disease:, morbid obesity         ROS comment: rhabdo. Endo/Other:              Pt had no PAT visit        ROS comment:  On steroids   rhabdo Abdominal:           Vascular: Anesthesia Plan      general     ASA 3     (Covid - 2/1/2021)  Induction: intravenous. Anesthetic plan and risks discussed with patient. Plan discussed with CRNA.     Attending anesthesiologist reviewed and agrees with Pre Eval content          GENESIS Gallego - CRNA   2/9/2021

## 2021-02-09 NOTE — CONSULTS
[address] [facility]  consulted by Dr. Rolly Bolden for monitoring and adjustment. Indication for treatment: Wrist infection, foot abscess  Goal trough: 15 mcg/mL    Pertinent Laboratory Values:   Temp Readings from Last 3 Encounters:   02/09/21 97.5 °F (36.4 °C) (Oral)     Recent Labs     02/07/21  0349 02/08/21  0550 02/09/21  0540   WBC 14.8* 13.1* 13.8*     Recent Labs     02/07/21  0349 02/08/21  0550 02/09/21  0540   BUN 10 10 9   CREATININE 0.6* 0.6* 0.6*     Estimated Creatinine Clearance: 145 mL/min (A) (based on SCr of 0.6 mg/dL (L)). No intake or output data in the 24 hours ending 02/09/21 1105    Pertinent Cultures:  Date    Source    Results  2/1               Blood                      Beta strep - group G  2/3               Foot wound                         Beta strep - group G  2/5                              Abscess - Surgical Cx        Beta strep - group G  2/5                              Blood                                  No growth  2/6                              Blood                                  No growth  2/9                              Surgical Cx - foot       Vancomycin level:   TROUGH:  No results for input(s): VANCOTROUGH in the last 72 hours. RANDOM:  No results for input(s): VANCORANDOM in the last 72 hours. Assessment:  WBC and temperature: WBC steady around 13.8, pt afebrile  SCr, BUN, and urine output: SCR normal, no UOP data  Day(s) of therapy:  1  Vancomycin concentration: scheduled for 2/10 @23:30    Plan:  Start vancomycin 1,500mg ivpb q12h  Check the vanco trough before the 4th dose tomorrow  Pharmacy will continue to monitor patient and adjust therapy as indicated    VANCOMYCIN CONCENTRATION SCHEDULED FOR 2/10 @23:30    Thank you for the consult. Aravind Pichardo   2/9/2021 11:05 AM

## 2021-02-09 NOTE — PROGRESS NOTES
debridement. Dressing changed at bedside today--tolerated well. Wound is improving. Patient Active Problem List:     Rhabdomyolysis     Foot abscess, left     Bacteremia due to group B Streptococcus     Sepsis ([facility] [address]      - continue IV antibiotics per ID, appreciated  - daily dressing changes and prn with iodoform packing  - gentle compression of LLE with ACE given significant venous reflux disease with edema  - increase ambulation  - nutritional supplements ordered, encourage PO intake  -Foot is improving each day. Will continue to monitor.  Case d/w Dr. Samuel Obregon MD

## 2021-02-10 PROBLEM — M00.232: Status: ACTIVE | Noted: 2021-02-10

## 2021-02-10 LAB
ANION GAP SERPL CALCULATED.3IONS-SCNC: 10 MMOL/L (ref 4–16)
BASOPHILS ABSOLUTE: 0 K/CU MM
BASOPHILS RELATIVE PERCENT: 0.3 % (ref 0–1)
BUN BLDV-MCNC: 8 MG/DL (ref 6–23)
CALCIUM SERPL-MCNC: 8 MG/DL (ref 8.3–10.6)
CHLORIDE BLD-SCNC: 93 MMOL/L (ref 99–110)
CO2: 26 MMOL/L (ref 21–32)
CREAT SERPL-MCNC: 0.6 MG/DL (ref 0.9–1.3)
CULTURE: ABNORMAL
CULTURE: ABNORMAL
CULTURE: NORMAL
CULTURE: NORMAL
DIFFERENTIAL TYPE: ABNORMAL
EOSINOPHILS ABSOLUTE: 0 K/CU MM
EOSINOPHILS RELATIVE PERCENT: 0 % (ref 0–3)
ERYTHROCYTE SEDIMENTATION RATE: 54 MM/HR (ref 0–20)
GFR AFRICAN AMERICAN: >60 ML/MIN/1.73M2
GFR NON-AFRICAN AMERICAN: >60 ML/MIN/1.73M2
GLUCOSE BLD-MCNC: 118 MG/DL (ref 70–99)
HCT VFR BLD CALC: 42.9 % (ref 42–52)
HEMOGLOBIN: 14.4 GM/DL (ref 13.5–18)
HIGH SENSITIVE C-REACTIVE PROTEIN: 76.6 MG/L
IMMATURE NEUTROPHIL %: 0.9 % (ref 0–0.43)
LYMPHOCYTES ABSOLUTE: 0.9 K/CU MM
LYMPHOCYTES RELATIVE PERCENT: 8.7 % (ref 24–44)
Lab: ABNORMAL
Lab: NORMAL
Lab: NORMAL
MCH RBC QN AUTO: 34.1 PG (ref 27–31)
MCHC RBC AUTO-ENTMCNC: 33.6 % (ref 32–36)
MCV RBC AUTO: 101.7 FL (ref 78–100)
MONOCYTES ABSOLUTE: 0.7 K/CU MM
MONOCYTES RELATIVE PERCENT: 6.9 % (ref 0–4)
NUCLEATED RBC %: 0 %
PDW BLD-RTO: 11.9 % (ref 11.7–14.9)
PLATELET # BLD: 469 K/CU MM (ref 140–440)
PMV BLD AUTO: 10 FL (ref 7.5–11.1)
POTASSIUM SERPL-SCNC: 4.4 MMOL/L (ref 3.5–5.1)
RBC # BLD: 4.22 M/CU MM (ref 4.6–6.2)
SEGMENTED NEUTROPHILS ABSOLUTE COUNT: 8.1 K/CU MM
SEGMENTED NEUTROPHILS RELATIVE PERCENT: 83.2 % (ref 36–66)
SODIUM BLD-SCNC: 129 MMOL/L (ref 135–145)
SPECIMEN: ABNORMAL
SPECIMEN: NORMAL
SPECIMEN: NORMAL
TOTAL IMMATURE NEUTOROPHIL: 0.09 K/CU MM
TOTAL NUCLEATED RBC: 0 K/CU MM
WBC # BLD: 9.7 K/CU MM (ref 4–10.5)

## 2021-02-10 PROCEDURE — 97535 SELF CARE MNGMENT TRAINING: CPT

## 2021-02-10 PROCEDURE — 6360000002 HC RX W HCPCS: Performed by: ORTHOPAEDIC SURGERY

## 2021-02-10 PROCEDURE — 6370000000 HC RX 637 (ALT 250 FOR IP): Performed by: ORTHOPAEDIC SURGERY

## 2021-02-10 PROCEDURE — 80048 BASIC METABOLIC PNL TOTAL CA: CPT

## 2021-02-10 PROCEDURE — 97530 THERAPEUTIC ACTIVITIES: CPT

## 2021-02-10 PROCEDURE — 97116 GAIT TRAINING THERAPY: CPT

## 2021-02-10 PROCEDURE — 94761 N-INVAS EAR/PLS OXIMETRY MLT: CPT

## 2021-02-10 PROCEDURE — 2580000003 HC RX 258: Performed by: ORTHOPAEDIC SURGERY

## 2021-02-10 PROCEDURE — 85025 COMPLETE CBC W/AUTO DIFF WBC: CPT

## 2021-02-10 PROCEDURE — 97110 THERAPEUTIC EXERCISES: CPT

## 2021-02-10 PROCEDURE — 36415 COLL VENOUS BLD VENIPUNCTURE: CPT

## 2021-02-10 PROCEDURE — 1200000000 HC SEMI PRIVATE

## 2021-02-10 PROCEDURE — 2580000003 HC RX 258: Performed by: INTERNAL MEDICINE

## 2021-02-10 PROCEDURE — 99233 SBSQ HOSP IP/OBS HIGH 50: CPT | Performed by: INTERNAL MEDICINE

## 2021-02-10 PROCEDURE — 99024 POSTOP FOLLOW-UP VISIT: CPT | Performed by: SURGERY

## 2021-02-10 PROCEDURE — 85652 RBC SED RATE AUTOMATED: CPT

## 2021-02-10 PROCEDURE — 86141 C-REACTIVE PROTEIN HS: CPT

## 2021-02-10 RX ORDER — SODIUM CHLORIDE 9 MG/ML
INJECTION, SOLUTION INTRAVENOUS CONTINUOUS
Status: DISCONTINUED | OUTPATIENT
Start: 2021-02-10 | End: 2021-02-12 | Stop reason: HOSPADM

## 2021-02-10 RX ADMIN — MULTIPLE VITAMINS W/ MINERALS TAB 1 TABLET: TAB at 09:59

## 2021-02-10 RX ADMIN — AMPICILLIN SODIUM 2000 MG: 2 INJECTION, POWDER, FOR SOLUTION INTRAVENOUS at 00:00

## 2021-02-10 RX ADMIN — SODIUM CHLORIDE: 9 INJECTION, SOLUTION INTRAVENOUS at 14:08

## 2021-02-10 RX ADMIN — MORPHINE SULFATE 2 MG: 2 INJECTION, SOLUTION INTRAMUSCULAR; INTRAVENOUS at 19:52

## 2021-02-10 RX ADMIN — AMPICILLIN SODIUM 2000 MG: 2 INJECTION, POWDER, FOR SOLUTION INTRAVENOUS at 11:59

## 2021-02-10 RX ADMIN — VANCOMYCIN HYDROCHLORIDE 1500 MG: 5 INJECTION, POWDER, LYOPHILIZED, FOR SOLUTION INTRAVENOUS at 01:00

## 2021-02-10 RX ADMIN — AMPICILLIN SODIUM 2000 MG: 2 INJECTION, POWDER, FOR SOLUTION INTRAVENOUS at 09:25

## 2021-02-10 RX ADMIN — FOLIC ACID 1 MG: 1 TABLET ORAL at 09:59

## 2021-02-10 RX ADMIN — Medication 100 MG: at 09:59

## 2021-02-10 RX ADMIN — VANCOMYCIN HYDROCHLORIDE 1500 MG: 5 INJECTION, POWDER, LYOPHILIZED, FOR SOLUTION INTRAVENOUS at 13:09

## 2021-02-10 RX ADMIN — SODIUM CHLORIDE, PRESERVATIVE FREE 10 ML: 5 INJECTION INTRAVENOUS at 10:03

## 2021-02-10 RX ADMIN — Medication 9 MG: at 19:52

## 2021-02-10 RX ADMIN — AMPICILLIN SODIUM 2000 MG: 2 INJECTION, POWDER, FOR SOLUTION INTRAVENOUS at 05:02

## 2021-02-10 RX ADMIN — ENOXAPARIN SODIUM 40 MG: 40 INJECTION SUBCUTANEOUS at 09:59

## 2021-02-10 RX ADMIN — AMPICILLIN SODIUM 2000 MG: 2 INJECTION, POWDER, FOR SOLUTION INTRAVENOUS at 19:52

## 2021-02-10 RX ADMIN — AMPICILLIN SODIUM 2000 MG: 2 INJECTION, POWDER, FOR SOLUTION INTRAVENOUS at 16:00

## 2021-02-10 ASSESSMENT — PAIN DESCRIPTION - ORIENTATION: ORIENTATION: LEFT

## 2021-02-10 ASSESSMENT — PAIN DESCRIPTION - PROGRESSION
CLINICAL_PROGRESSION: NOT CHANGED
CLINICAL_PROGRESSION: NOT CHANGED

## 2021-02-10 ASSESSMENT — PAIN SCALES - GENERAL
PAINLEVEL_OUTOF10: 0
PAINLEVEL_OUTOF10: 7

## 2021-02-10 ASSESSMENT — PAIN DESCRIPTION - PAIN TYPE: TYPE: ACUTE PAIN

## 2021-02-10 ASSESSMENT — PAIN DESCRIPTION - ONSET
ONSET: ON-GOING
ONSET: ON-GOING

## 2021-02-10 ASSESSMENT — PAIN DESCRIPTION - LOCATION: LOCATION: WRIST

## 2021-02-10 ASSESSMENT — PAIN DESCRIPTION - DESCRIPTORS: DESCRIPTORS: ACHING;DISCOMFORT

## 2021-02-10 ASSESSMENT — PAIN - FUNCTIONAL ASSESSMENT: PAIN_FUNCTIONAL_ASSESSMENT: PREVENTS OR INTERFERES SOME ACTIVE ACTIVITIES AND ADLS

## 2021-02-10 NOTE — PROGRESS NOTES
Talked with pt daughter who informed that pt is a heavy daily drinker. Pt has been agitated and anxious this afternoon since coming back from surgery. Keeps asking for his keys so he can go home or for someone to take him back to the hospital for the night. Reoriented with difficulty. Dr. Matt Pierre notified that patient may need some ativan. No new orders at this time.

## 2021-02-10 NOTE — PLAN OF CARE
Problem: Pain:  Goal: Pain level will decrease  Description: Pain level will decrease  Outcome: Ongoing  Goal: Control of acute pain  Description: Control of acute pain  Outcome: Ongoing  Goal: Control of chronic pain  Description: Control of chronic pain  Outcome: Ongoing     Problem: Wound:  Goal: Will show signs of wound healing; wound closure and no evidence of infection  Description: Will show signs of wound healing; wound closure and no evidence of infection  Outcome: Ongoing     Problem: Pressure Ulcer:  Goal: Signs of wound healing will improve  Description: Signs of wound healing will improve  Outcome: Ongoing  Goal: Absence of new pressure ulcer  Description: Absence of new pressure ulcer  Outcome: Ongoing  Goal: Will show no infection signs and symptoms  Description: Will show no infection signs and symptoms  Outcome: Ongoing     Problem: Arterial:  Goal: Optimize blood flow for wound healing  Description: Optimize blood flow for wound healing  Outcome: Ongoing     Problem: Venous:  Goal: Signs of wound healing will improve  Description: Signs of wound healing will improve  Outcome: Ongoing     Problem: Smoking cessation:  Goal: Ability to formulate a plan to maintain a tobacco-free life will be supported  Description: Ability to formulate a plan to maintain a tobacco-free life will be supported  Outcome: Ongoing     Problem: Compression therapy:  Goal: Will be free from complications associated with compression therapy  Description: Will be free from complications associated with compression therapy  Outcome: Ongoing     Problem: Weight control:  Goal: Ability to maintain an optimal weight for height and age will be supported  Description: Ability to maintain an optimal weight for height and age will be supported  Outcome: Ongoing     Problem: Falls - Risk of:  Goal: Will remain free from falls  Description: Will remain free from falls  Outcome: Ongoing     Problem: Blood Glucose:  Goal: Ability to maintain appropriate glucose levels will improve  Description: Ability to maintain appropriate glucose levels will improve  Outcome: Ongoing     Problem: Falls - Risk of:  Goal: Will remain free from falls  Description: Will remain free from falls  Outcome: Ongoing  Goal: Absence of physical injury  Description: Absence of physical injury  Outcome: Ongoing     Problem: Skin Integrity:  Goal: Will show no infection signs and symptoms  Description: Will show no infection signs and symptoms  Outcome: Ongoing  Goal: Absence of new skin breakdown  Description: Absence of new skin breakdown  Outcome: Ongoing     Problem: Confusion - Acute:  Goal: Absence of continued neurological deterioration signs and symptoms  Description: Absence of continued neurological deterioration signs and symptoms  Outcome: Ongoing  Goal: Mental status will be restored to baseline  Description: Mental status will be restored to baseline  Outcome: Ongoing     Problem: Discharge Planning:  Goal: Ability to perform activities of daily living will improve  Description: Ability to perform activities of daily living will improve  Outcome: Ongoing  Goal: Participates in care planning  Description: Participates in care planning  Outcome: Ongoing     Problem: Injury - Risk of, Physical Injury:  Goal: Will remain free from falls  Description: Will remain free from falls  Outcome: Ongoing  Goal: Absence of physical injury  Description: Absence of physical injury  Outcome: Ongoing     Problem: Mood - Altered:  Goal: Mood stable  Description: Mood stable  Outcome: Ongoing  Goal: Absence of abusive behavior  Description: Absence of abusive behavior  Outcome: Ongoing  Goal: Verbalizations of feeling emotionally comfortable while being cared for will increase  Description: Verbalizations of feeling emotionally comfortable while being cared for will increase  Outcome: Ongoing     Problem: Psychomotor Activity - Altered:  Goal: Absence of psychomotor disturbance signs and symptoms  Description: Absence of psychomotor disturbance signs and symptoms  Outcome: Ongoing     Problem: Sensory Perception - Impaired:  Goal: Demonstrations of improved sensory functioning will increase  Description: Demonstrations of improved sensory functioning will increase  Outcome: Ongoing  Goal: Decrease in sensory misperception frequency  Description: Decrease in sensory misperception frequency  Outcome: Ongoing  Goal: Able to refrain from responding to false sensory perceptions  Description: Able to refrain from responding to false sensory perceptions  Outcome: Ongoing  Goal: Demonstrates accurate environmental perceptions  Description: Demonstrates accurate environmental perceptions  Outcome: Ongoing  Goal: Able to distinguish between reality-based and nonreality-based thinking  Description: Able to distinguish between reality-based and nonreality-based thinking  Outcome: Ongoing  Goal: Able to interrupt nonreality-based thinking  Description: Able to interrupt nonreality-based thinking  Outcome: Ongoing     Problem: Sleep Pattern Disturbance:  Goal: Appears well-rested  Description: Appears well-rested  Outcome: Ongoing     Problem: DAILY CARE  Goal: Daily care needs are met  Outcome: Ongoing     Problem: KNOWLEDGE DEFICIT  Goal: Patient/S.O. demonstrates understanding of disease process, treatment plan, medications, and discharge instructions.   Outcome: Ongoing     Problem: DISCHARGE BARRIERS  Goal: Patient's continuum of care needs are met  Outcome: Ongoing

## 2021-02-10 NOTE — PROGRESS NOTES
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 No formal WB status in the chart on LUE and therefore NWB was utilized this date.     Subjective:  Patient states:  \"I don't want to over do it\"   Pain:   Location, Type, Intensity (0/10 to 10/10): 8/10 left wrist   Objective:    Observation:    Supine in bed. Left wrist splinted, left foot gauze and ace wrapped. Cooperative to work with therapy with some encouragement. Treatment, including education/measures:  Supine to sit: Christal for trunk boost with facilitation of RUE to bed rail. HOB elevated ~60 deg   Scooting: fwd to EOB and recliner SBA for safety  Sit to stand: modA from EOB and recliner for lift, steadying, and weight shift assist fwd over ALEA. Cues for set up and sequencing. Assist with LUE up to platform of walker. Stand to sit: modA for controlling sit to recliner. Cues for sequencing LUE off platform and RUE back to arm rest for better support  Step pivot: Christal for steadying with assistance and constant cues for sequencing full pivot turn with platform RW. Performed 2x   Ambulation: ~20ft x 2 with platform RW Christal for balance and some assistance navigating turns with bulky walker and little functional use of LUE. Inconsistent pace with slower on first trial then quick and impulsive on second. Fwd posture with left lateral LOB on turns. Limited with pain and fatigue. Adequate time for rest breaks between trials  Sitting balance: SBA EOB static without UE support x ~3 minutes   Standing balance: CGA to Christal static at platform RW.  Postural and weight shift cues to attain midline and upright posture. Educated pt on POC, role of PT, DME use, progression with mobility. Cues for sequencing, posture, weight shift, UE/LE placement to inc safety and indep with mobility   Assessment / Impression:    Patient's tolerance of treatment:  Good   Adverse Reaction: no  Significant change in status and impact:  Slight progression with ambulation tolerance today   Barriers to improvement:  Activity tolerance, strength, balance, splinted wrist, pain, fear of falling   Plan for Next Session:    Activity tolerance, strength, balance, gait, transfers,bed mobility   Time in:  1442  Time out:  1506  Timed treatment minutes:  24  Total treatment time:  24    Previously filed items:  Social/Functional History  Lives With: Alone  Type of Home: House  Home Layout: One level  Home Access: (2 steps to enter)  Bathroom Shower/Tub: Tub/Shower unit  Home Equipment: Rolling walker, 4 wheeled walker, Cane(Pt reports at baseline he ambulates with his RW in the community.  Within the house pt ambulates with a cane.)  Receives Help From: (Pt reports the  at his house checks in on pt as needed.)  ADL Assistance: [address] [place] [address] Avenue: Independent  Homemaking Responsibilities: Yes  Ambulation Assistance: Independent  Transfer Assistance: Independent  Active : Yes  Mode of Transportation: Car  Short term goals  Time Frame for Short term goals: 1 week  Short term goal 1: Pt will perform sit><supine Christal  Short term goal 2: Pt will transition sit><stand CGA  Short term goal 3: Pt will ambulte 20ft with LRAD CGA  Short term goal 4: Pt will ascend/descend 2 steps, single rail Christal       Electronically signed by:    Phyllis Del Rio [id number]  2/10/2021, 3:05 PM

## 2021-02-10 NOTE — PROGRESS NOTES
GENERAL SURGERY PROGRESS NOTE    Jamarcus Hardy is a 77 y.o. male with left foot infection. POD 5 from wound debridement/I&D. Subjective:  Reports left foot feels better. Denies left foot pain. Went to OR yesterday for left wrist - reviewed brief op report. Tolerating PO. Objective:    Vitals: VITALS:  /74   Pulse 86   Temp 97.6 °F (36.4 °C) (Oral)   Resp 17   Ht 5' 10\" (1.778 m)   Wt 282 lb 4.8 oz (128.1 kg)   SpO2 93%   BMI 40.51 kg/m²     I/O: 02/09 0701 - 02/10 0700  In: 1050 [I.V.:1050]  Out: 530 [Urine:525]    Labs/Imaging Results:   Lab Results   Component Value Date     02/10/2021    K 4.4 02/10/2021    CL 93 02/10/2021    CO2 26 02/10/2021    BUN 8 02/10/2021    CREATININE 0.6 02/10/2021    GLUCOSE 118 02/10/2021    CALCIUM 8.0 02/10/2021      Lab Results   Component Value Date    WBC 9.7 02/10/2021    HGB 14.4 02/10/2021    HCT 42.9 02/10/2021    .7 (H) 02/10/2021     (H) 02/10/2021     Scheduled Meds:   vancomycin, 1,500 mg, Intravenous, Q12H    vitamin B-1, 100 mg, Oral, Daily    folic acid, 1 mg, Oral, Daily    therapeutic multivitamin-minerals, 1 tablet, Oral, Daily    ampicillin IV, 2,000 mg, Intravenous, 6 times per day    sodium chloride flush, 10 mL, Intravenous, 2 times per day    Physical Exam:  General: alert, no distress. HEENT: Anicteric sclerae, MMM. Extremities: chronic venous changes in the lower legs. Left foot wound ~4x3cm and 1cm deep with some undermining, minimal amount of s/s drainage on dressing but no active drainage from wound,  no foul odor. Erythema present over the dorsal foot-- continued mild improved. Abdomen: Soft, nontender    Culture Abnormal  02/05/2021  2:18 PM [address] [facility]   BETA STREP GROUP G Light growth Susceptibility testing of penicillin and other beta lactams is not necessary for beta hemolytic Streptococci since resistant strains have not been identified.  (CLSI M100)       Assessment and Plan:  77 y.o. male s/p left foot debridement. Dressing changed at bedside today--tolerated well. Wound is improving. Patient Active Problem List:     Rhabdomyolysis     Foot abscess, left     Bacteremia due to group B Streptococcus     Sepsis ([facility] [address]      - continue IV antibiotics per ID, appreciated  - daily dressing changes and prn with iodoform packing  - gentle compression of LLE with ACE given significant venous reflux disease with edema  - increase ambulation  - nutritional supplements ordered, encourage PO intake  -Foot is improving each day. Will continue to monitor.     Devorah Ordaz MD

## 2021-02-10 NOTE — PROGRESS NOTES
Infectious Disease Progress Note  2/10/2021   Patient Name: Awais Perez : 1945       Reason for visit: F/u sepsis secondary to group G streptococcus bacteremia, left foot abscess. ? History:? Interval history noted. Status post left foot I&D on 2/3/2021. Aspiration of left wrist  1 mL of blood  Continues to have pain in his left wrist and left hallux  Physical Exam:  Vital Signs: /74   Pulse 104   Temp 97.6 °F (36.4 °C) (Oral)   Resp 16   Ht 5' 10\" (1.778 m)   Wt 282 lb 4.8 oz (128.1 kg)   SpO2 94%   BMI 40.51 kg/m²     Gen: alert and oriented X3, no distress  Skin: no stigmata of endocarditis  Wounds: left foot  dorsum of the left first MTP joint  HEMT: AT/NC Oropharynx pink, moist, and without lesions or exudates; dentition in good state of repair  Eyes: PERRLA, EOMI, conjunctiva pink, sclera anicteric. Neck: Supple. Trachea midline. No LAD. Chest: no distress and CTA. Good air movement. Heart: RRR and no MRG. Abd: soft, non-distended, no tenderness, no hepatomegaly. Normoactive bowel sounds. Ext: left wrist swelling  Catheter Site: without erythema or tenderness  LDA: CVC:  Neuro: Mental status intact. CN 2-12 intact and no focal sensory or motor deficits       Radiologic / Imaging / TESTING  THREE XRAY VIEWS OF THE LEFT FOOT     2021 2:41 pm     COMPARISON:   None. 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 There is mild soft tissue   swelling at the level of the 1st MTP joint as well. Deformity of the 5th   proximal phalanx likely related to prior trauma. Mild osteopenia. No acute   fracture or dislocation. Degenerative changes in the midfoot.   Soft tissue swelling along the dorsum of the foot is also noted. Vascular   calcifications. Calcaneal spur along the plantar aspect. 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 Degenerative changes are also noted in the midfoot. Otherwise no acute osseous abnormality.         Labs:    Recent Results (from the past 24 hour(s))   Body Fluid Cell Count with Differential    Collection Time: 02/09/21  2:47 PM   Result Value Ref Range    Fluid Type SYNOVIAL FLUID INDEX    RBC, Fluid 173,000 /CU MM    WBC, Fluid 58,260 /CU MM    Neutrophil Count, Fluid 88 %    Lymphocytes, Body Fluid 1 %    Monocyte Count, Fluid 11 %    Other Cells, Fluid 2 NRBC    CBC auto differential    Collection Time: 02/10/21  5:45 AM   Result Value Ref Range    WBC 9.7 4.0 - 10.5 K/CU MM    RBC 4.22 (L) 4.6 - 6.2 M/CU MM    Hemoglobin 14.4 13.5 - 18.0 GM/DL    Hematocrit 42.9 42 - 52 %    .7 (H) 78 - 100 FL    MCH 34.1 (H) 27 - 31 PG    MCHC 33.6 32.0 - 36.0 %    RDW 11.9 11.7 - 14.9 %    Platelets 211 (H) 229 - 440 K/CU MM    MPV 10.0 7.5 - 11.1 FL    Differential Type AUTOMATED DIFFERENTIAL     Segs Relative 83.2 (H) 36 - 66 %    Lymphocytes % 8.7 (L) 24 - 44 %    Monocytes % 6.9 (H) 0 - 4 %    Eosinophils % 0.0 0 - 3 %    Basophils % 0.3 0 - 1 %    Segs Absolute 8.1 K/CU MM    Lymphocytes Absolute 0.9 K/CU MM    Monocytes Absolute 0.7 K/CU MM    Eosinophils Absolute 0.0 K/CU MM    Basophils Absolute 0.0 K/CU MM    Nucleated RBC % 0.0 %    Total Nucleated RBC 0.0 K/CU MM    Total Immature Neutrophil 0.09 K/CU MM    Immature Neutrophil % 0.9 (H) 0 - 0.43 %   Basic metabolic panel    Collection Time: 02/10/21  5:45 AM   Result Value Ref Range    Sodium 129 (L) 135 - 145 MMOL/L    Potassium 4.4 3.5 - 5.1 MMOL/L    Chloride 93 (L) 99 - 110 mMol/L    CO2 26 21 - 32 MMOL/L    Anion Gap 10 4 - 16    BUN 8 6 - 23 MG/DL    CREATININE 0.6 (L) 0.9 - 1.3 MG/DL    Glucose 118 (H) 70 - 99 MG/DL Calcium 8.0 (L) 8.3 - 10.6 MG/DL    GFR Non-African American >60 >60 mL/min/1.73m2    GFR African American >60 >60 mL/min/1.73m2   C-Reactive Protein    Collection Time: 02/10/21  5:45 AM   Result Value Ref Range    CRP, High Sensitivity 76.6 mg/L   Sedimentation Rate    Collection Time: 02/10/21  5:45 AM   Result Value Ref Range    Sed Rate 54 (H) 0 - 20 MM/HR     CULTURE results: Invalid input(s): BLOOD CULTURE,  URINE CULTURE, SURGICAL CULTURE    Diagnosis:  Patient Active Problem List   Diagnosis    Rhabdomyolysis    Foot abscess, left    Bacteremia due to group B Streptococcus    Sepsis (HCC)    Pain and swelling of left wrist    Slac (scapholunate advanced collapse) of wrist, right    Streptococcal arthritis of left wrist (HCC)       Active Problems  Principal Problem:    Rhabdomyolysis  Active Problems:    Foot abscess, left    Bacteremia due to group B Streptococcus    Sepsis (HCC)    Pain and swelling of left wrist    Slac (scapholunate advanced collapse) of wrist, right    Streptococcal arthritis of left wrist (HCC)  Resolved Problems:    * No resolved hospital problems. *      Impression and plan   Summary and rationale: Patient is a 77 y.o.  male admitted after a fall at home, was on the floor possibly for 2 days. Had nontraumatic rhabdomyolysis. And the chronic left foot ulcer catheter had an abscess. Chronic left wrist pain, joint aspiration did not yield appreciable fluid. Diagnosed with Group G Streptococcus bacteremia. Source is left hallux abscess as the culture is also positive for group B streptococcus. Initially had a bedside I&D, however, repeat I&D was performed on 2/5/2021 in the OR. 4 mL of pus was removed and loculations were broken up with a forceps. Uric acid level 2.9 mg/dL. [place] Angry Left wrist I and D, examination under fluoroscopy on 2/9- Gm stain. GPC.  Clinical status: Afebrile, leukocytosis persists, but procalcitonin is on a downward trend. .  Blood cultures from 2/5, 0/2 NGTD   Therapeutic: Ampicillin 2/4-, continue vancomycin on 2/9-   Completed antibiotics Zosyn 2/3-4, vancomycin 2/3-4   Diagnostic: Wound culture (2/9/2021)   F/u: left wrist culture   Other:.        Electronically signed by: Electronically signed by Dori Cabral MD on 2/10/2021 at 1:06 PM

## 2021-02-10 NOTE — CONSULTS
[facility]  consulted by Dr. Chester Balderas for monitoring and adjustment. Indication for treatment: Wrist infection, foot abscess  Goal trough: 15 mcg/mL    Pertinent Laboratory Values:   Temp Readings from Last 3 Encounters:   02/10/21 97.6 °F (36.4 °C) (Oral)   02/09/21 97 °F (36.1 °C)     Recent Labs     02/08/21  0550 02/09/21  0540 02/10/21  0545   WBC 13.1* 13.8* 9.7     Recent Labs     02/08/21  0550 02/09/21  0540 02/10/21  0545   BUN 10 9 8   CREATININE 0.6* 0.6* 0.6*     Estimated Creatinine Clearance: 145 mL/min (A) (based on SCr of 0.6 mg/dL (L)). Intake/Output Summary (Last 24 hours) at 2/10/2021 1156  Last data filed at 2/9/2021 1816  Gross per 24 hour   Intake 1050 ml   Output 530 ml   Net 520 ml       Pertinent Cultures:  Date    Source    Results  2/1               Blood                      Beta strep - group G  2/3               Foot wound                         Beta strep - group G  2/5                              Abscess - Surgical Cx        Beta strep - group G  2/5                              Blood                                  No growth  2/6                              Blood                                  No growth  2/9                              Surgical Cx - foot       Vancomycin level:   TROUGH:  No results for input(s): VANCOTROUGH in the last 72 hours. RANDOM:  No results for input(s): VANCORANDOM in the last 72 hours. Assessment:  · WBC and temperature: WBC WNL, pt afebrile  · SCr, BUN, and urine output: SCR normal, no UOP data  · Day(s) of therapy:  2  · Vancomycin concentration: scheduled for 2/10 @23:30    Plan:  · Renal labs stable , wnl  · Continue on Vancomycin 1,500mg ivpb q12h  · Trough level due tonight @ 2330. · Pharmacy will continue to monitor patient and adjust therapy as indicated    Betzy 3 2/10 @23:30    Thank you for the consult.   Adriano Pichardo, [address]   2/10/2021 11:56 AM

## 2021-02-10 NOTE — PROGRESS NOTES
ORTHOPEDIC POST-OP PROGRESS NOTE    2/10/2021    Patient name: Uriel Christine  : 1945    SUBJECTIVE   The patient was seen and examined. Uriel Christine is POD#1 from I&D left wrist with exam under fluoroscopy for left wrist infection vs EPL traumatic rupture. Cultures obtained in OR and results pending. Per brief op note noted proximal migration of capitate with fragmented scaphoid proximal pole. Patient resting in bed. Expresses that he wants to eat his food. Notes some improvement in pain to the left wrist since surgery but still notes some pain. OBJECTIVE     Vitals:    02/10/21 1430   BP: 128/75   Pulse: 100   Resp: 16   Temp: 97.4 °F (36.3 °C)   SpO2:      I/O last 3 completed shifts: In: 1050 [I.V.:1050]  Out: 480 [Urine:475; Blood:5]    Physical Exam:   GEN: A&O. Resting in bed. MS: LUE- splint in place; swelling to hand; tolerates active/passive motion of all digits with some stiffness; arm soft; CR <2 sec; SILT    Labs:   CBC/COAGS  Recent Labs     21  0540 02/10/21  0545   WBC 13.8* 9.7   HCT 45.0 42.9    469*     BMP  Recent Labs     21  0540 02/10/21  0545   * 129*   K 4.8 4.4   CL 92* 93*   CO2 27 26   BUN 9 8   CREATININE 0.6* 0.6*         ASSESSMENT     77 y.o. male, POD#1    PLAN     1. Encourage motion of fingers and elevate slightly above level of heart for edema control. 2. OR aspirate/culture results pending. Will continue to follow. 3. Continue management/recommendations by ID and Internal Medicine team.   4. Discussed with Dr. Melanie Rodríguez.      Electronically signed by:Jessica Smith PA-C, 2/10/2021 3:07 PM

## 2021-02-10 NOTE — PLAN OF CARE
Problem: Pain:  Goal: Pain level will decrease  Description: Pain level will decrease  2/10/2021 1846 by Josette Fournier RN  Outcome: Ongoing  2/10/2021 1842 by Josette Fournier RN  Outcome: Ongoing  Goal: Control of acute pain  Description: Control of acute pain  2/10/2021 1846 by Josette Fournier RN  Outcome: Ongoing  2/10/2021 1842 by Josette Fournier RN  Outcome: Ongoing  Goal: Control of chronic pain  Description: Control of chronic pain  2/10/2021 1846 by Josette Fournier RN  Outcome: Ongoing  2/10/2021 1842 by Josette Fournier RN  Outcome: Ongoing     Problem: Wound:  Goal: Will show signs of wound healing; wound closure and no evidence of infection  Description: Will show signs of wound healing; wound closure and no evidence of infection  2/10/2021 1846 by Josette Fournier RN  Outcome: Ongoing  2/10/2021 1842 by Josette Fournier RN  Outcome: Ongoing     Problem: Pressure Ulcer:  Goal: Signs of wound healing will improve  Description: Signs of wound healing will improve  2/10/2021 1846 by Josette Fournier RN  Outcome: Ongoing  2/10/2021 1842 by Josette Fournier RN  Outcome: Ongoing  Goal: Absence of new pressure ulcer  Description: Absence of new pressure ulcer  2/10/2021 1846 by Josette Fournier RN  Outcome: Ongoing  2/10/2021 1842 by Josette Fournier RN  Outcome: Ongoing  Goal: Will show no infection signs and symptoms  Description: Will show no infection signs and symptoms  2/10/2021 1846 by Josette Fournier RN  Outcome: Ongoing  2/10/2021 1842 by Josette Fournier RN  Outcome: Ongoing     Problem: Arterial:  Goal: Optimize blood flow for wound healing  Description: Optimize blood flow for wound healing  2/10/2021 1846 by Josette Fournier RN  Outcome: Ongoing  2/10/2021 1842 by Josette Fournier RN  Outcome: Ongoing     Problem: Venous:  Goal: Signs of wound healing will improve  Description: Signs of wound healing will improve  2/10/2021 1846 by Josette Fournier RN  Outcome: Ongoing  2/10/2021 1842 by Josette Fournier RN  Outcome: Ongoing     Problem: Smoking cessation:  Goal: Ability to formulate a plan to maintain a tobacco-free life will be supported  Description: Ability to formulate a plan to maintain a tobacco-free life will be supported  2/10/2021 1846 by Tiff Perez RN  Outcome: Ongoing  2/10/2021 1842 by Tiff Perez RN  Outcome: Ongoing     Problem: Compression therapy:  Goal: Will be free from complications associated with compression therapy  Description: Will be free from complications associated with compression therapy  2/10/2021 1846 by Tiff Perez RN  Outcome: Ongoing  2/10/2021 1842 by Tiff Perez RN  Outcome: Ongoing     Problem: Weight control:  Goal: Ability to maintain an optimal weight for height and age will be supported  Description: Ability to maintain an optimal weight for height and age will be supported  2/10/2021 1846 by Tiff Perez RN  Outcome: Ongoing  2/10/2021 1842 by Tiff Perez RN  Outcome: Ongoing     Problem: Falls - Risk of:  Goal: Will remain free from falls  Description: Will remain free from falls  2/10/2021 1846 by Tiff Perez RN  Outcome: Ongoing  2/10/2021 1842 by Tiff Perez RN  Outcome: Ongoing     Problem: Blood Glucose:  Goal: Ability to maintain appropriate glucose levels will improve  Description: Ability to maintain appropriate glucose levels will improve  2/10/2021 1846 by Tiff Perez RN  Outcome: Ongoing  2/10/2021 1842 by Tiff Perez RN  Outcome: Ongoing     Problem: Falls - Risk of:  Goal: Will remain free from falls  Description: Will remain free from falls  2/10/2021 1846 by Tiff Perez RN  Outcome: Ongoing  2/10/2021 1842 by Tiff Perez RN  Outcome: Ongoing  Goal: Absence of physical injury  Description: Absence of physical injury  2/10/2021 1846 by Tiff Perez RN  Outcome: Ongoing  2/10/2021 1842 by Tiff Perez RN  Outcome: Ongoing     Problem: Skin Integrity:  Goal: Will show no infection signs and symptoms  Description: Will show no infection signs and symptoms  2/10/2021 1846 by Paul Garcia RN  Outcome: Ongoing  2/10/2021 1842 by Paul Garcia RN  Outcome: Ongoing  Goal: Absence of new skin breakdown  Description: Absence of new skin breakdown  2/10/2021 1846 by Paul Garcia RN  Outcome: Ongoing  2/10/2021 1842 by Paul Garcia RN  Outcome: Ongoing     Problem: Confusion - Acute:  Goal: Absence of continued neurological deterioration signs and symptoms  Description: Absence of continued neurological deterioration signs and symptoms  2/10/2021 1846 by Paul Garcia RN  Outcome: Ongoing  2/10/2021 1842 by Paul Garcia RN  Outcome: Ongoing  Goal: Mental status will be restored to baseline  Description: Mental status will be restored to baseline  2/10/2021 1846 by Paul Garcia RN  Outcome: Ongoing  2/10/2021 1842 by Paul Garcia RN  Outcome: Ongoing     Problem: Discharge Planning:  Goal: Ability to perform activities of daily living will improve  Description: Ability to perform activities of daily living will improve  2/10/2021 1846 by Paul Garcia RN  Outcome: Ongoing  2/10/2021 1842 by Paul Garcia RN  Outcome: Ongoing  Goal: Participates in care planning  Description: Participates in care planning  2/10/2021 1846 by Paul Garcia RN  Outcome: Ongoing  2/10/2021 1842 by Paul Garcia RN  Outcome: Ongoing     Problem: Injury - Risk of, Physical Injury:  Goal: Will remain free from falls  Description: Will remain free from falls  2/10/2021 1846 by Paul Garcia RN  Outcome: Ongoing  2/10/2021 1842 by Paul Garcia RN  Outcome: Ongoing  Goal: Absence of physical injury  Description: Absence of physical injury  2/10/2021 1846 by Paul Garcia RN  Outcome: Ongoing  2/10/2021 1842 by Paul Garcia RN  Outcome: Ongoing     Problem: Mood - Altered:  Goal: Mood stable  Description: Mood stable  2/10/2021 1846 by Paul Garcia RN  Outcome: Ongoing  2/10/2021 1842 by Paul Garcia RN  Outcome: Ongoing  Goal: Absence of abusive behavior  Description: Absence of abusive behavior  2/10/2021 1846 by Ruslan Davis RN  Outcome: Ongoing  2/10/2021 1842 by Ruslan Davis RN  Outcome: Ongoing  Goal: Verbalizations of feeling emotionally comfortable while being cared for will increase  Description: Verbalizations of feeling emotionally comfortable while being cared for will increase  2/10/2021 1846 by Ruslan Davis RN  Outcome: Ongoing  2/10/2021 1842 by Ruslan Davis RN  Outcome: Ongoing     Problem: Psychomotor Activity - Altered:  Goal: Absence of psychomotor disturbance signs and symptoms  Description: Absence of psychomotor disturbance signs and symptoms  2/10/2021 1846 by Ruslan Davis RN  Outcome: Ongoing  2/10/2021 1842 by Ruslan Davis RN  Outcome: Ongoing     Problem: Sensory Perception - Impaired:  Goal: Demonstrations of improved sensory functioning will increase  Description: Demonstrations of improved sensory functioning will increase  2/10/2021 1846 by Ruslan Davis RN  Outcome: Ongoing  2/10/2021 1842 by Ruslan Davis RN  Outcome: Ongoing  Goal: Decrease in sensory misperception frequency  Description: Decrease in sensory misperception frequency  2/10/2021 1846 by Ruslan Davis RN  Outcome: Ongoing  2/10/2021 1842 by Ruslan Davis RN  Outcome: Ongoing  Goal: Able to refrain from responding to false sensory perceptions  Description: Able to refrain from responding to false sensory perceptions  2/10/2021 1846 by Ruslan Davis RN  Outcome: Ongoing  2/10/2021 1842 by Ruslan Davis RN  Outcome: Ongoing  Goal: Demonstrates accurate environmental perceptions  Description: Demonstrates accurate environmental perceptions  2/10/2021 1846 by Ruslan Davis RN  Outcome: Ongoing  2/10/2021 1842 by Ruslan Davis RN  Outcome: Ongoing  Goal: Able to distinguish between reality-based and nonreality-based thinking  Description: Able to distinguish between reality-based and nonreality-based thinking  2/10/2021 1846 by Ruslan Davis RN  Outcome: Ongoing  2/10/2021 1842 by Payton Case RN  Outcome: Ongoing  Goal: Able to interrupt nonreality-based thinking  Description: Able to interrupt nonreality-based thinking  2/10/2021 1846 by Payton Case RN  Outcome: Ongoing  2/10/2021 1842 by Payton Case RN  Outcome: Ongoing     Problem: Sleep Pattern Disturbance:  Goal: Appears well-rested  Description: Appears well-rested  2/10/2021 1846 by Payton Case RN  Outcome: Ongoing  2/10/2021 1842 by Payton Case RN  Outcome: Ongoing     Problem: DAILY CARE  Goal: Daily care needs are met  2/10/2021 1846 by Payton Case RN  Outcome: Ongoing  2/10/2021 1842 by Payton Case RN  Outcome: Ongoing     Problem: KNOWLEDGE DEFICIT  Goal: Patient/S.O. demonstrates understanding of disease process, treatment plan, medications, and discharge instructions.   2/10/2021 1846 by Payton Case RN  Outcome: Ongoing  2/10/2021 1842 by Payton Case RN  Outcome: Ongoing     Problem: DISCHARGE BARRIERS  Goal: Patient's continuum of care needs are met  2/10/2021 1846 by Payton Case RN  Outcome: Ongoing  2/10/2021 1842 by Payton Case RN  Outcome: Ongoing

## 2021-02-10 NOTE — PROGRESS NOTES
Hospitalist Progress Note      Name:  Giulia Villegas /Age/Sex: 1945  (77 y.o. male)   MRN & CSN:  [id number] & [id number] Admission Date/Time: 2021  2:05 PM   Location:  [address] [place] [address] PCP: No primary care provider on file. Hospital Day: 10    Assessment and Plan:   Giulia Villegas is a 77 y.o.  male  who presents with Rhabdomyolysis    Nontraumatic Rhadomyolysis : Resolved  Mechanical fall at home  Due to Prolonged immobilization after a fall. No associated CISCO. CK trending down   IVF discontinued. PT/OT-recommend SNF - approved but not medically ready     Left Triquetral fracture (wrist bone), likely from fall on outstretched arm. S/P wrist aspiration  Status post left wrist irrigation and debridement   Found to have EPL rupture traumatic versus infectious  Continue antibiotics for now.     Beta Streptococcus Group G bacteremia with sepsis(1/4 bottles)  Left foot abscess (present on admission)  Located around the Right hallux base. No evidence of osteomyelitis on Xray. S/p I&D on 2/3/21 by Dr. Anna Nova at the bedside  S/p I&D on 21 in OR - 4 cc purulent material expressed - -culture beta strep  ID on board:IV ampicillin for 2 weeks following negative blood culture   Vancomycin started. General surgery on board    Acute metabolic encephalopathy: Could be from delirium. Could also be from infection   history of alcohol use disorder, unlikely withdrawal.      Hypokalemia  Replace and follow BMP    Hyponatremia: Could be from poor oral intake. Start IV fluid. We will continue to monitor.     Alcohol use disorder. Serum alcohol not elevated.    No withdrawal symptoms at this time. Continue folate and thiamine.      Incidental Right Adrenal nodule - Repeat imaging in 1 year. PCP to f/u. Abnormal LFTs - from rhabdomyloysis. Improving. Antibiotic mgt as above. Constipation - CT abd/pel shows stools in rectal vault.  Plan: Miralax PRN        Diet DIET GENERAL; Dental Soft DVT Prophylaxis [x] Lovenox, []  Heparin, [] SCDs, [] Warfarin  [] NOAC     GI Prophylaxis [] PPI,  [] H2 Blocker,  [] Carafate,  [x] Diet/Tube Feeds   Code Status Full Code   MDM [] Low, [x] Moderate,[]  High     History of Present Illness:     Chief Complaint: Rhabdomyolysis    Seen and examined today. Confused this morning. Has splint on the left arm. Left foot with dressing. Ten point ROS reviewed negative, unless as noted above    Objective: Intake/Output Summary (Last 24 hours) at 2/10/2021 1259  Last data filed at 2/9/2021 1816  Gross per 24 hour   Intake 1050 ml   Output 480 ml   Net 570 ml      Vitals:   Vitals:    02/10/21 1200   BP:    Pulse: 104   Resp:    Temp:    SpO2:      Physical Exam:   GEN confused this morning. EYES Pupils are equally round. No scleral erythema, discharge, or conjunctivitis. HENT Mucous membranes are moist. Oral pharynx without exudates, no evidence of thrush. NECK Supple, no apparent thyromegaly or masses. RESP Clear to auscultation, no wheezes, rales or rhonchi. Symmetric chest movement while on room air. CARDIO/VASC S1/S2 auscultated. Regular rate without appreciable murmurs, rubs, or gallops. No JVD or carotid bruits. Peripheral pulses equal bilaterally and palpable. No peripheral edema. GI Abdomen is soft without significant tenderness, masses, or guarding. Bowel sounds are normoactive. Rectal exam deferred. MSK splint in the left arm, dressing on the left foot. SKIN Normal coloration, warm, dry.     Medications:   Medications:    vancomycin  1,500 mg Intravenous Q12H    vitamin B-1  100 mg Oral Daily    folic acid  1 mg Oral Daily    therapeutic multivitamin-minerals  1 tablet Oral Daily    ampicillin IV  2,000 mg Intravenous 6 times per day    sodium chloride flush  10 mL Intravenous 2 times per day      Infusions:     PRN Meds:     melatonin, 9 mg, Nightly PRN      morphine, 2 mg, Q2H PRN    Or      morphine, 4 mg, Q2H PRN      sodium chloride flush, 10 mL, PRN      promethazine, 12.5 mg, Q6H PRN    Or      ondansetron, 4 mg, Q6H PRN      polyethylene glycol, 17 g, Daily PRN      acetaminophen, 650 mg, Q6H PRN    Or      acetaminophen, 650 mg, Q6H PRN      Recent Labs     02/08/21  0550 02/09/21  0540 02/10/21  0545   WBC 13.1* 13.8* 9.7   HGB 14.7 14.9 14.4   HCT 45.3 45.0 42.9    421 469*      Recent Labs     02/08/21  0550 02/09/21  0540 02/10/21  0545   * 131* 129*   K 4.7 4.8 4.4   CL 94* 92* 93*   CO2 30 27 26   BUN 10 9 8   CREATININE 0.6* 0.6* 0.6*     Imaging reviewed    Electronically signed by Trevor Irwin MD on 2/10/2021 at 12:59 PM

## 2021-02-10 NOTE — PROGRESS NOTES
Occupational Therapy  . Occupational Therapy Treatment Note  Name: Nuvia Webber MRN: [id number] :   1945   Date:  2/10/2021   Admission Date: 2021 Room:  [address] [place] [address]   Restrictions/Precautions:    General precautions; Fall Risk, NWB precautions LUE    Communication with other providers:  Per chart review and Nurse Crystal, patient is appropriate for therapeutic intervention. Nurse Tech provided 2nd person assist for transfer back to bed. Nurse reports pt is having difficulty c self-feeding, requesting to be Dep feed. Subjective:  Patient states:  Pt agreeable to Tx session. Pain:   Location, Type, Intensity (0/10 to 10/10):  9/10, LUE, \"it's the swelling. \"     Objective:    Observation:  Pt received seated in bedside chair c splint / ACE wrap on L forearm / hand, swelling noted. ACE wrap to LLE. In function, pt has limited AROM for RUE, difficulty c grasping and shoulder mobility, managed c modified AROM and intermittent assist for difficulty grasping cup, able to grasp wash cloth and brush, required touching assist for thoroughness of hair care. Objective Measures:  IV. Treatment, including education:  Therapeutic Activity Training:   Therapeutic activity training was instructed today. Cues were given for safety, sequence, UE/LE placement, awareness, and balance. Activities performed today included bed mobility training, sup-sit, sit-stand, SPT. With emphasis on UB strengthening, pt performed Min A for scoot forward in chair, BLE on floor, and support provided to LUE while pt used RUE to facilitate scooting. In this position, pt Sup to tolerate 25 minutes in dynamic sitting balance at edge of chair while performing therapeutic exercises and grooming tasks as detailed below. Pt educated for NWB precautions and for positioning LUE for comfort and for elevation to manage swelling.    Sit<>stand: Min A x2 c support to LUE and cues for NWB  Stand Step Transfer: Min A to Mod A x2 w/o AD x3 steps and cues for safe body positioning, support provided to LUE / compliance to NWB precautions. Sit to supine: Mod A x2  Scooting: Min A for support of LUE / compliance to NWB precautions while pt scooted forward / back. Dep for upward scooting in bed. Therapeutic Exercise:  Cues were given for technique, safety, recruitment, and rationale. Cues were verbal and/or tactile. Pt Sup + vc's for technique to perform RUE AROM exercises to include: shoulder flex/extension, internal/external rotation, chest presses, bicep curls, rowing, and supination/pronation x10 reps each c emphasis on dynamic sitting balance. With support to L forearm (compliance to NWB precautions), pt Min A for gentle, minimal  AARO  at L shoulder and elbow for flex/extension and for internal / external rotation x5 reps, AROM for finger flexion/extension c LUE supported. Self Care Training:   Cues were given for safety, sequence, UE/LE placement, visual cues, and balance. Activities performed today included grooming and self-feeding c use of RUE. Pt required Min A for touching assist for grasp on cup to bring drink to mouth for several opportunities for self-feeding. Grooming: Min A for thoroughness of hair care at top of head s/p pt performed brushing of hair, Sup + cue for thorough wash of face. All therapeutic intervention performed c emphasis on self-feeding, grooming tasks, dynamic balance / sitting and standing tolerance to inc strength, endurance and act tolerance for inc Indep c ADL tasks, func transfers / mobility. Safety  Patient safely in bed + alarm at end of session, with call light/phone in reach, and nursing aware. Gait belt was used for func transfers / mobility.     Assessment / Impression:        Patient's tolerance of treatment:  Well   Adverse Reaction: None  Significant change in status and impact:  None  Barriers to improvement:  LUE NWB precautions, decreased mobility, balance, strength, endurance    Plan for Next Session:    Continue per OT POC per patient's tolerance c emphasis on standing balance / tolerance for ADL tasks. Time in:  1508  Time out:  1550  Timed treatment minutes:  42  Total treatment time:  42    Electronically signed by:    VERÓNICA Holt  2/10/2021, 2:51 PM    Previously filed values:    Goals:  1. Pt will complete all aspects of bed mobility for EOB/OOB ADLs Joseph. 2. Pt will complete UB/LB bathing with Joseph and AE as needed. 3. Pt will complete all aspects of LB dressing with ModA and AE as needed. 4. Pt will complete all functional transfers to and from bed, chair, toilet, shower chair with CGA and AD. 5. Pt will ambulate HH distance to bathroom for toileting with CGA and AD. 6. Pt will complete all aspects of toileting task with Joseph. 7. Pt will complete oral hygiene/grooming routine in standing at sink with Joseph demo good dynamic standing balance for approx 8 minutes. 8. Pt will complete ther ex/ther act with focus on UB strengthening.

## 2021-02-11 LAB
ANION GAP SERPL CALCULATED.3IONS-SCNC: 8 MMOL/L (ref 4–16)
BASOPHILS ABSOLUTE: 0 K/CU MM
BASOPHILS RELATIVE PERCENT: 0.2 % (ref 0–1)
BUN BLDV-MCNC: 9 MG/DL (ref 6–23)
CALCIUM SERPL-MCNC: 7.8 MG/DL (ref 8.3–10.6)
CHLORIDE BLD-SCNC: 95 MMOL/L (ref 99–110)
CO2: 25 MMOL/L (ref 21–32)
CREAT SERPL-MCNC: 0.6 MG/DL (ref 0.9–1.3)
CULTURE: NORMAL
CULTURE: NORMAL
DIFFERENTIAL TYPE: ABNORMAL
DOSE AMOUNT: NORMAL
DOSE TIME: NORMAL
EOSINOPHILS ABSOLUTE: 0.1 K/CU MM
EOSINOPHILS RELATIVE PERCENT: 0.6 % (ref 0–3)
GFR AFRICAN AMERICAN: >60 ML/MIN/1.73M2
GFR NON-AFRICAN AMERICAN: >60 ML/MIN/1.73M2
GLUCOSE BLD-MCNC: 105 MG/DL (ref 70–99)
HCT VFR BLD CALC: 40.6 % (ref 42–52)
HEMOGLOBIN: 13.7 GM/DL (ref 13.5–18)
HIGH SENSITIVE C-REACTIVE PROTEIN: 47 MG/L
IMMATURE NEUTROPHIL %: 0.6 % (ref 0–0.43)
LYMPHOCYTES ABSOLUTE: 1.4 K/CU MM
LYMPHOCYTES RELATIVE PERCENT: 15.6 % (ref 24–44)
Lab: NORMAL
Lab: NORMAL
MCH RBC QN AUTO: 34.6 PG (ref 27–31)
MCHC RBC AUTO-ENTMCNC: 33.7 % (ref 32–36)
MCV RBC AUTO: 102.5 FL (ref 78–100)
MONOCYTES ABSOLUTE: 1.1 K/CU MM
MONOCYTES RELATIVE PERCENT: 12.4 % (ref 0–4)
NUCLEATED RBC %: 0 %
PDW BLD-RTO: 12 % (ref 11.7–14.9)
PLATELET # BLD: 439 K/CU MM (ref 140–440)
PMV BLD AUTO: 9.2 FL (ref 7.5–11.1)
POTASSIUM SERPL-SCNC: 4.1 MMOL/L (ref 3.5–5.1)
RBC # BLD: 3.96 M/CU MM (ref 4.6–6.2)
SEGMENTED NEUTROPHILS ABSOLUTE COUNT: 6.3 K/CU MM
SEGMENTED NEUTROPHILS RELATIVE PERCENT: 70.6 % (ref 36–66)
SODIUM BLD-SCNC: 128 MMOL/L (ref 135–145)
SPECIMEN: NORMAL
SPECIMEN: NORMAL
TOTAL IMMATURE NEUTOROPHIL: 0.05 K/CU MM
TOTAL NUCLEATED RBC: 0 K/CU MM
VANCOMYCIN TROUGH: 12.8 UG/ML (ref 10–20)
WBC # BLD: 8.9 K/CU MM (ref 4–10.5)

## 2021-02-11 PROCEDURE — 2580000003 HC RX 258: Performed by: INTERNAL MEDICINE

## 2021-02-11 PROCEDURE — 80048 BASIC METABOLIC PNL TOTAL CA: CPT

## 2021-02-11 PROCEDURE — 2580000003 HC RX 258: Performed by: ORTHOPAEDIC SURGERY

## 2021-02-11 PROCEDURE — 94150 VITAL CAPACITY TEST: CPT

## 2021-02-11 PROCEDURE — 6360000002 HC RX W HCPCS: Performed by: INTERNAL MEDICINE

## 2021-02-11 PROCEDURE — 1200000000 HC SEMI PRIVATE

## 2021-02-11 PROCEDURE — 6370000000 HC RX 637 (ALT 250 FOR IP): Performed by: ORTHOPAEDIC SURGERY

## 2021-02-11 PROCEDURE — 6360000002 HC RX W HCPCS: Performed by: ORTHOPAEDIC SURGERY

## 2021-02-11 PROCEDURE — 2500000003 HC RX 250 WO HCPCS: Performed by: ORTHOPAEDIC SURGERY

## 2021-02-11 PROCEDURE — 99024 POSTOP FOLLOW-UP VISIT: CPT | Performed by: SURGERY

## 2021-02-11 PROCEDURE — 97535 SELF CARE MNGMENT TRAINING: CPT

## 2021-02-11 PROCEDURE — 85025 COMPLETE CBC W/AUTO DIFF WBC: CPT

## 2021-02-11 PROCEDURE — 80202 ASSAY OF VANCOMYCIN: CPT

## 2021-02-11 PROCEDURE — 94761 N-INVAS EAR/PLS OXIMETRY MLT: CPT

## 2021-02-11 PROCEDURE — 86141 C-REACTIVE PROTEIN HS: CPT

## 2021-02-11 PROCEDURE — 36415 COLL VENOUS BLD VENIPUNCTURE: CPT

## 2021-02-11 RX ORDER — METHION/INOS/CHOL BT/B COM/LIV 110MG-86MG
100 CAPSULE ORAL DAILY
Status: DISCONTINUED | OUTPATIENT
Start: 2021-02-11 | End: 2021-02-12

## 2021-02-11 RX ADMIN — SODIUM CHLORIDE, PRESERVATIVE FREE 10 ML: 5 INJECTION INTRAVENOUS at 08:01

## 2021-02-11 RX ADMIN — VANCOMYCIN HYDROCHLORIDE 1250 MG: 5 INJECTION, POWDER, LYOPHILIZED, FOR SOLUTION INTRAVENOUS at 12:58

## 2021-02-11 RX ADMIN — AMPICILLIN SODIUM 2000 MG: 2 INJECTION, POWDER, FOR SOLUTION INTRAVENOUS at 11:58

## 2021-02-11 RX ADMIN — AMPICILLIN SODIUM 2000 MG: 2 INJECTION, POWDER, FOR SOLUTION INTRAVENOUS at 23:25

## 2021-02-11 RX ADMIN — AMPICILLIN SODIUM 2000 MG: 2 INJECTION, POWDER, FOR SOLUTION INTRAVENOUS at 16:17

## 2021-02-11 RX ADMIN — SODIUM CHLORIDE: 9 INJECTION, SOLUTION INTRAVENOUS at 17:09

## 2021-02-11 RX ADMIN — SODIUM CHLORIDE: 9 INJECTION, SOLUTION INTRAVENOUS at 01:09

## 2021-02-11 RX ADMIN — MULTIPLE VITAMINS W/ MINERALS TAB 1 TABLET: TAB at 09:14

## 2021-02-11 RX ADMIN — FOLIC ACID 1 MG: 1 TABLET ORAL at 09:14

## 2021-02-11 RX ADMIN — AMPICILLIN SODIUM 2000 MG: 2 INJECTION, POWDER, FOR SOLUTION INTRAVENOUS at 00:36

## 2021-02-11 RX ADMIN — VANCOMYCIN HYDROCHLORIDE 1500 MG: 5 INJECTION, POWDER, LYOPHILIZED, FOR SOLUTION INTRAVENOUS at 01:09

## 2021-02-11 RX ADMIN — AMPICILLIN SODIUM 2000 MG: 2 INJECTION, POWDER, FOR SOLUTION INTRAVENOUS at 07:59

## 2021-02-11 RX ADMIN — MORPHINE SULFATE 4 MG: 4 INJECTION, SOLUTION INTRAMUSCULAR; INTRAVENOUS at 06:07

## 2021-02-11 RX ADMIN — MORPHINE SULFATE 4 MG: 4 INJECTION, SOLUTION INTRAMUSCULAR; INTRAVENOUS at 01:38

## 2021-02-11 RX ADMIN — Medication 100 MG: at 12:03

## 2021-02-11 RX ADMIN — AMPICILLIN SODIUM 2000 MG: 2 INJECTION, POWDER, FOR SOLUTION INTRAVENOUS at 20:19

## 2021-02-11 RX ADMIN — VANCOMYCIN HYDROCHLORIDE 1250 MG: 5 INJECTION, POWDER, LYOPHILIZED, FOR SOLUTION INTRAVENOUS at 23:52

## 2021-02-11 RX ADMIN — AMPICILLIN SODIUM 2000 MG: 2 INJECTION, POWDER, FOR SOLUTION INTRAVENOUS at 04:26

## 2021-02-11 ASSESSMENT — PAIN DESCRIPTION - PAIN TYPE: TYPE: ACUTE PAIN

## 2021-02-11 ASSESSMENT — PAIN DESCRIPTION - ORIENTATION
ORIENTATION: LEFT
ORIENTATION: LEFT

## 2021-02-11 ASSESSMENT — PAIN DESCRIPTION - LOCATION: LOCATION: WRIST

## 2021-02-11 ASSESSMENT — PAIN SCALES - GENERAL: PAINLEVEL_OUTOF10: 8

## 2021-02-11 ASSESSMENT — PAIN DESCRIPTION - ONSET: ONSET: ON-GOING

## 2021-02-11 ASSESSMENT — PAIN - FUNCTIONAL ASSESSMENT: PAIN_FUNCTIONAL_ASSESSMENT: PREVENTS OR INTERFERES SOME ACTIVE ACTIVITIES AND ADLS

## 2021-02-11 ASSESSMENT — PAIN DESCRIPTION - FREQUENCY: FREQUENCY: INTERMITTENT

## 2021-02-11 ASSESSMENT — PAIN DESCRIPTION - PROGRESSION: CLINICAL_PROGRESSION: NOT CHANGED

## 2021-02-11 NOTE — PROGRESS NOTES
Hospitalist Progress Note      Name:  Bertin Owen /Age/Sex: 1945  (77 y.o. male)   MRN & CSN:  [id number] & [id number] Admission Date/Time: 2021  2:05 PM   Location:  [facility] PCP: No primary care provider on file. Hospital Day: 11    Assessment and Plan:   Bertin Owen is a 77 y.o.  male  who presents with Rhabdomyolysis    Left Triquetral fracture (wrist bone), likely from fall on outstretched arm. S/P wrist aspiration  Status post left wrist irrigation and debridement   Found to have EPL rupture traumatic versus infectious  Surgical culture pending  Continue antibiotics for now.     Beta Streptococcus Group G bacteremia with sepsis(1/4 bottles)  Left foot abscess (present on admission)  Located around the Right hallux base. No evidence of osteomyelitis on Xray. S/p I&D on 2/3/21 by Dr. Lionel Martinez at the bedside  S/p I&D on 21 in OR - 4 cc purulent material expressed - -culture beta strep  ID on board:IV ampicillin for 2 weeks following negative blood culture   Vancomycin started. General surgery on board    Acute metabolic encephalopathy: Could be from delirium. Could also be from infection   history of alcohol use disorder, unlikely withdrawal.  More awake today. Nontraumatic Rhadomyolysis : Resolved  Mechanical fall at home  Due to Prolonged immobilization after a fall. No associated CISCO. CK trending down   IVF discontinued. PT/OT-recommend SNF - approved but not medically ready      Hypokalemia  Replace and follow BMP    Hyponatremia: Could be from poor oral intake. Start IV fluid. We will continue to monitor.     Alcohol use disorder. Serum alcohol not elevated.    No withdrawal symptoms at this time. Continue folate and thiamine.      Incidental Right Adrenal nodule - Repeat imaging in 1 year. PCP to f/u. Abnormal LFTs - from rhabdomyloysis. Constipation - CT abd/pel shows stools in rectal vault.  Plan: Miralax PRN    Diet DIET GENERAL; Dental Soft   DVT Prophylaxis [x] Lovenox, []  Heparin, [] SCDs, [] Warfarin  [] NOAC     GI Prophylaxis [] PPI,  [] H2 Blocker,  [] Carafate,  [x] Diet/Tube Feeds   Code Status Full Code   MDM [] Low, [x] Moderate,[]  High     History of Present Illness:     Chief Complaint: Rhabdomyolysis    Seen and examined today. More awake this morning. Mild pain on the left wrist.    Ten point ROS reviewed negative, unless as noted above    Objective: Intake/Output Summary (Last 24 hours) at 2/11/2021 1346  Last data filed at 2/11/2021 1000  Gross per 24 hour   Intake --   Output 150 ml   Net -150 ml      Vitals:   Vitals:    02/11/21 0900   BP:    Pulse: 73   Resp:    Temp:    SpO2:      Physical Exam:   GEN more awake, not in respiratory distress  EYES Pupils are equally round. No scleral erythema, discharge, or conjunctivitis. HENT Mucous membranes are moist. Oral pharynx without exudates, no evidence of thrush. NECK Supple, no apparent thyromegaly or masses. RESP Clear to auscultation, no wheezes, rales or rhonchi. Symmetric chest movement while on room air. CARDIO/VASC S1/S2 auscultated. Regular rate without appreciable murmurs, rubs, or gallops. No JVD or carotid bruits. Peripheral pulses equal bilaterally and palpable. No peripheral edema. GI Abdomen is soft without significant tenderness, masses, or guarding. Bowel sounds are normoactive. Rectal exam deferred. MSK splint in the left arm, dressing on the left foot. With swelling of fingers on the left hand  SKIN Normal coloration, warm, dry.     Medications:   Medications:    vancomycin  1,250 mg Intravenous Q12H    B-1  100 mg Oral Daily    folic acid  1 mg Oral Daily    therapeutic multivitamin-minerals  1 tablet Oral Daily    ampicillin IV  2,000 mg Intravenous 6 times per day    sodium chloride flush  10 mL Intravenous 2 times per day      Infusions:    sodium chloride 75 mL/hr at 02/11/21 0109     PRN Meds:     melatonin, 9 mg, Nightly PRN    

## 2021-02-11 NOTE — PROGRESS NOTES
Physical Therapy    Attempted tx, pt requests no PT on this date. States he is too tired and has some pain from his wound dressing that was recently changed. States he would like PT \" tomorrow\".            Electronically signed by:    Johny Caraballo, VIVIANA  2/11/2021, 3:47 PM

## 2021-02-11 NOTE — PROGRESS NOTES
[address] [facility]  consulted by Dr. Lora Nicole for monitoring and adjustment. Indication for treatment: L foot abscess, bacteremia (group G strep)  Goal trough: 10-15 mcg/mL, AUC <500    Pertinent Laboratory Values:   Temp Readings from Last 3 Encounters:   02/11/21 97.5 °F (36.4 °C) (Oral)   02/09/21 97 °F (36.1 °C)     Recent Labs     02/09/21  0540 02/10/21  0545 02/11/21  0003   WBC 13.8* 9.7 8.9     Recent Labs     02/09/21  0540 02/10/21  0545 02/11/21  0003   BUN 9 8 9   CREATININE 0.6* 0.6* 0.6*     Estimated Creatinine Clearance: 112 mL/min (A) (based on SCr of 0.6 mg/dL (L)). No intake or output data in the 24 hours ending 02/11/21 0935    Pertinent Cultures:  Date    Source    Results  2/1   Blood                       Beta strep - group G  2/3   Foot wound                          Beta strep - group G  2/5                              Abscess - Surgical Cx         Beta strep - group G  2/5                              Blood                                   No growth  2/6                              Blood                                  No growth  2/9                              Surgical Cx - foot  No growth        Vancomycin level:   TROUGH:    Recent Labs     02/11/21  0003   VANCOTROUGH 12.8     RANDOM:  No results for input(s): VANCORANDOM in the last 72 hours. Assessment:  · WBC and temperature: WBC WNL, pt afebrile  · SCr, BUN, and urine output: stable  · Day(s) of therapy: 3  · Vancomycin concentration: 12.8, therapeutic     Plan:  · Vancomycin 1,500 mg q12h resulted in a therapeutic trough   · Accumulation is expected based on age, current dosing regimen will produce trough >15 and AUC >500 once steady state is reached   · Decrease dose to 1250 mg q12   · Pharmacy will continue to monitor patient and adjust therapy as indicated    Betzy 3 2/13 @ 5849    Thank you for the consult.   Miguel A Davis, MansiD, BCPS    2/11/2021 9:35

## 2021-02-11 NOTE — PROGRESS NOTES
ORTHOPEDIC POST-OP PROGRESS NOTE    2021    Patient name: Joan Mccall  : 1945    SUBJECTIVE   The patient was seen and examined. Joan cMcall is POD#2 from I&D left wrist with exam under fluoroscopy for left wrist infection vs EPL traumatic rupture. Cultures obtained in OR and results pending. Per brief op note noted proximal migration of capitate with fragmented scaphoid proximal pole. Notes some improvement in pain to the left wrist since surgery. Resting in bed and wishes to sleep and not be bothered. OBJECTIVE     Vitals:    21 0900   BP:    Pulse: 73   Resp:    Temp:    SpO2:      No intake/output data recorded. Physical Exam:   GEN: A&O. Resting in bed. MS: LUE- splint in place; remains edematous to dorsum hand and digits; tolerates active/passive motion of all digits with some stiffness; arm soft; CR <2 sec; SILT    Labs:   CBC/COAGS  Recent Labs     02/10/21  0545 21  0003   WBC 9.7 8.9   HCT 42.9 40.6*   * 439     BMP  Recent Labs     02/10/21  0545 21  0003   * 128*   K 4.4 4.1   CL 93* 95*   CO2 26 25   BUN 8 9   CREATININE 0.6* 0.6*     WBC improved  48035 WBC cell count intra-op  Gram + cocci on gram stain; preliminary culture with no growth @ 36-48 hours    ASSESSMENT     77 y.o. male, POD#2    PLAN     1. Encourage motion of fingers and elevate slightly above level of heart for edema control. 2. OR aspirate/culture results pending. Will continue to follow.     3. Continue management/recommendations by ID and Internal Medicine team.      Electronically signed by:Leidy Cloud PA-C, 2021 1:33 PM

## 2021-02-11 NOTE — PROGRESS NOTES
Occupational Therapy  Occupational Therapy Treatment Note    Date:  2021   Room:  [address] [place] [address]    Jorge Mendez :   1945   MRN: [id number] Admission Date: 2021     Diagnosis:  The primary encounter diagnosis was Non-traumatic rhabdomyolysis. Diagnoses of Leukocytosis, unspecified type and Fall in elderly patient were also pertinent to this visit. Restrictions/Precautions:                      Communication with other providers:  . Subjective:  Patient states:  \"I need help eating\" call light activated  Pain:   Location, Type, Intensity (0/10 to 10/10):  No c/o    Objective:    Orientation: WFL   Observation: Pt received in bed, having difficulty eating, call light activated, L wrist in splint and digits quite swollen  Objective Measures:  VSS     Treatment, including education:  Self Care Training:   Cues were given for safety, sequence, UE/LE placement, visual cues, and balance. Activities performed today included dressing, toileting, hand hygiene, and grooming. Feeding: SBA in high fowlers    PROM for shldr, elbow flexion 5 reps . Very limited digit motion due to edema    Assessment / Impression:      Patient's tolerance of treatment:  WFL    Significant change in status and impact:  none  Barriers to improvement:  none  Recommendations: SNF    Plan for Next Session:    Cont POC addressing goals:    Goals:  1. Pt will complete all aspects of bed mobility for EOB/OOB ADLs Joseph.   2. Pt will complete UB/LB bathing with Joseph and AE as needed. 3. Pt will complete all aspects of LB dressing with ModA and AE as needed. 4. Pt will complete all functional transfers to and from bed, chair, toilet, shower chair with CGA and AD. 5. Pt will ambulate HH distance to bathroom for toileting with CGA and AD. 6. Pt will complete all aspects of toileting task with Joseph.   7. Pt will complete oral hygiene/grooming routine in standing at sink with Joseph demo good dynamic standing balance for approx 8 minutes. 8. Pt will complete ther ex/ther act with focus on UB strengthening.       Safety: Left in chair with all needs in reach. Gait belt used for transfer and mobility. Time in:  0950  Time out:  1007  Timed treatment minutes:  17  Total treatment time:  17    Electronically signed by:     410Sonya Falk, PRIETOR/L, [place]   [id number]   1:21 PM, 2/11/2021      Previously filed values:

## 2021-02-12 VITALS
HEIGHT: 70 IN | BODY MASS INDEX: 24.42 KG/M2 | OXYGEN SATURATION: 95 % | HEART RATE: 97 BPM | DIASTOLIC BLOOD PRESSURE: 58 MMHG | WEIGHT: 170.6 LBS | TEMPERATURE: 98.6 F | RESPIRATION RATE: 17 BRPM | SYSTOLIC BLOOD PRESSURE: 98 MMHG

## 2021-02-12 LAB
ANION GAP SERPL CALCULATED.3IONS-SCNC: 8 MMOL/L (ref 4–16)
BASOPHILS ABSOLUTE: 0 K/CU MM
BASOPHILS RELATIVE PERCENT: 0.5 % (ref 0–1)
BUN BLDV-MCNC: 6 MG/DL (ref 6–23)
CALCIUM SERPL-MCNC: 8.2 MG/DL (ref 8.3–10.6)
CHLORIDE BLD-SCNC: 98 MMOL/L (ref 99–110)
CO2: 27 MMOL/L (ref 21–32)
CREAT SERPL-MCNC: 0.6 MG/DL (ref 0.9–1.3)
DIFFERENTIAL TYPE: ABNORMAL
EOSINOPHILS ABSOLUTE: 0.1 K/CU MM
EOSINOPHILS RELATIVE PERCENT: 0.9 % (ref 0–3)
GFR AFRICAN AMERICAN: >60 ML/MIN/1.73M2
GFR NON-AFRICAN AMERICAN: >60 ML/MIN/1.73M2
GLUCOSE BLD-MCNC: 91 MG/DL (ref 70–99)
HCT VFR BLD CALC: 43.7 % (ref 42–52)
HEMOGLOBIN: 14.4 GM/DL (ref 13.5–18)
HIGH SENSITIVE C-REACTIVE PROTEIN: 50.5 MG/L
IMMATURE NEUTROPHIL %: 0.6 % (ref 0–0.43)
LYMPHOCYTES ABSOLUTE: 1 K/CU MM
LYMPHOCYTES RELATIVE PERCENT: 11.2 % (ref 24–44)
MCH RBC QN AUTO: 34.2 PG (ref 27–31)
MCHC RBC AUTO-ENTMCNC: 33 % (ref 32–36)
MCV RBC AUTO: 103.8 FL (ref 78–100)
MONOCYTES ABSOLUTE: 0.9 K/CU MM
MONOCYTES RELATIVE PERCENT: 10.4 % (ref 0–4)
NUCLEATED RBC %: 0 %
PDW BLD-RTO: 12.1 % (ref 11.7–14.9)
PLATELET # BLD: 508 K/CU MM (ref 140–440)
PMV BLD AUTO: 9.4 FL (ref 7.5–11.1)
POTASSIUM SERPL-SCNC: 4.5 MMOL/L (ref 3.5–5.1)
RBC # BLD: 4.21 M/CU MM (ref 4.6–6.2)
SEGMENTED NEUTROPHILS ABSOLUTE COUNT: 6.5 K/CU MM
SEGMENTED NEUTROPHILS RELATIVE PERCENT: 76.4 % (ref 36–66)
SODIUM BLD-SCNC: 133 MMOL/L (ref 135–145)
TOTAL IMMATURE NEUTOROPHIL: 0.05 K/CU MM
TOTAL NUCLEATED RBC: 0 K/CU MM
WBC # BLD: 8.5 K/CU MM (ref 4–10.5)

## 2021-02-12 PROCEDURE — 86141 C-REACTIVE PROTEIN HS: CPT

## 2021-02-12 PROCEDURE — 97110 THERAPEUTIC EXERCISES: CPT

## 2021-02-12 PROCEDURE — 36415 COLL VENOUS BLD VENIPUNCTURE: CPT

## 2021-02-12 PROCEDURE — 6360000002 HC RX W HCPCS: Performed by: INTERNAL MEDICINE

## 2021-02-12 PROCEDURE — U0002 COVID-19 LAB TEST NON-CDC: HCPCS

## 2021-02-12 PROCEDURE — 80048 BASIC METABOLIC PNL TOTAL CA: CPT

## 2021-02-12 PROCEDURE — 6370000000 HC RX 637 (ALT 250 FOR IP): Performed by: ORTHOPAEDIC SURGERY

## 2021-02-12 PROCEDURE — 99233 SBSQ HOSP IP/OBS HIGH 50: CPT | Performed by: INTERNAL MEDICINE

## 2021-02-12 PROCEDURE — 85025 COMPLETE CBC W/AUTO DIFF WBC: CPT

## 2021-02-12 PROCEDURE — 94761 N-INVAS EAR/PLS OXIMETRY MLT: CPT

## 2021-02-12 PROCEDURE — 36410 VNPNXR 3YR/> PHY/QHP DX/THER: CPT

## 2021-02-12 PROCEDURE — 2580000003 HC RX 258: Performed by: ORTHOPAEDIC SURGERY

## 2021-02-12 PROCEDURE — 2580000003 HC RX 258: Performed by: INTERNAL MEDICINE

## 2021-02-12 PROCEDURE — 6360000002 HC RX W HCPCS: Performed by: ORTHOPAEDIC SURGERY

## 2021-02-12 PROCEDURE — C1751 CATH, INF, PER/CENT/MIDLINE: HCPCS

## 2021-02-12 PROCEDURE — 99232 SBSQ HOSP IP/OBS MODERATE 35: CPT | Performed by: ORTHOPAEDIC SURGERY

## 2021-02-12 PROCEDURE — 76937 US GUIDE VASCULAR ACCESS: CPT

## 2021-02-12 RX ORDER — SODIUM CHLORIDE 9 MG/ML
10 INJECTION INTRAVENOUS 2 TIMES DAILY
Qty: 1 VIAL | Refills: 5 | Status: SHIPPED | OUTPATIENT
Start: 2021-02-12

## 2021-02-12 RX ORDER — CEFAZOLIN SODIUM 1 G/3ML
2000 INJECTION, POWDER, FOR SOLUTION INTRAMUSCULAR; INTRAVENOUS EVERY 8 HOURS
Qty: 228000 MG | Refills: 0 | Status: SHIPPED | OUTPATIENT
Start: 2021-02-12 | End: 2021-03-22

## 2021-02-12 RX ORDER — LIDOCAINE HYDROCHLORIDE 10 MG/ML
5 INJECTION, SOLUTION EPIDURAL; INFILTRATION; INTRACAUDAL; PERINEURAL ONCE
Status: DISCONTINUED | OUTPATIENT
Start: 2021-02-12 | End: 2021-02-12 | Stop reason: HOSPADM

## 2021-02-12 RX ORDER — CEFAZOLIN SODIUM 1 G/3ML
2000 INJECTION, POWDER, FOR SOLUTION INTRAMUSCULAR; INTRAVENOUS EVERY 8 HOURS
Qty: 228000 MG | Refills: 0 | Status: SHIPPED | OUTPATIENT
Start: 2021-02-12 | End: 2021-02-12 | Stop reason: HOSPADM

## 2021-02-12 RX ORDER — SODIUM CHLORIDE 0.9 % (FLUSH) 0.9 %
10 SYRINGE (ML) INJECTION EVERY 12 HOURS SCHEDULED
Status: DISCONTINUED | OUTPATIENT
Start: 2021-02-12 | End: 2021-02-12 | Stop reason: HOSPADM

## 2021-02-12 RX ORDER — LANOLIN ALCOHOL/MO/W.PET/CERES
100 CREAM (GRAM) TOPICAL DAILY
Status: DISCONTINUED | OUTPATIENT
Start: 2021-02-12 | End: 2021-02-12 | Stop reason: HOSPADM

## 2021-02-12 RX ORDER — M-VIT,TX,IRON,MINS/CALC/FOLIC 27MG-0.4MG
1 TABLET ORAL DAILY
Qty: 30 TABLET | Refills: 0 | Status: SHIPPED | OUTPATIENT
Start: 2021-02-13

## 2021-02-12 RX ORDER — LANOLIN ALCOHOL/MO/W.PET/CERES
100 CREAM (GRAM) TOPICAL DAILY
Qty: 30 TABLET | Refills: 3 | Status: SHIPPED | OUTPATIENT
Start: 2021-02-13

## 2021-02-12 RX ORDER — FOLIC ACID 1 MG/1
1 TABLET ORAL DAILY
Qty: 30 TABLET | Refills: 3 | Status: SHIPPED | OUTPATIENT
Start: 2021-02-13

## 2021-02-12 RX ORDER — SODIUM CHLORIDE 0.9 % (FLUSH) 0.9 %
10 SYRINGE (ML) INJECTION PRN
Status: DISCONTINUED | OUTPATIENT
Start: 2021-02-12 | End: 2021-02-12 | Stop reason: HOSPADM

## 2021-02-12 RX ADMIN — MULTIPLE VITAMINS W/ MINERALS TAB 1 TABLET: TAB at 08:01

## 2021-02-12 RX ADMIN — FOLIC ACID 1 MG: 1 TABLET ORAL at 08:01

## 2021-02-12 RX ADMIN — CEFAZOLIN SODIUM 2000 MG: 10 INJECTION, POWDER, FOR SOLUTION INTRAVENOUS at 15:17

## 2021-02-12 RX ADMIN — Medication 100 MG: at 08:01

## 2021-02-12 RX ADMIN — AMPICILLIN SODIUM 2000 MG: 2 INJECTION, POWDER, FOR SOLUTION INTRAVENOUS at 08:01

## 2021-02-12 RX ADMIN — MORPHINE SULFATE 4 MG: 4 INJECTION, SOLUTION INTRAMUSCULAR; INTRAVENOUS at 18:58

## 2021-02-12 RX ADMIN — SODIUM CHLORIDE, PRESERVATIVE FREE 10 ML: 5 INJECTION INTRAVENOUS at 08:09

## 2021-02-12 RX ADMIN — MORPHINE SULFATE 4 MG: 4 INJECTION, SOLUTION INTRAMUSCULAR; INTRAVENOUS at 01:04

## 2021-02-12 RX ADMIN — MORPHINE SULFATE 4 MG: 4 INJECTION, SOLUTION INTRAMUSCULAR; INTRAVENOUS at 06:45

## 2021-02-12 RX ADMIN — MORPHINE SULFATE 4 MG: 4 INJECTION, SOLUTION INTRAMUSCULAR; INTRAVENOUS at 12:26

## 2021-02-12 RX ADMIN — AMPICILLIN SODIUM 2000 MG: 2 INJECTION, POWDER, FOR SOLUTION INTRAVENOUS at 04:00

## 2021-02-12 RX ADMIN — VANCOMYCIN HYDROCHLORIDE 1250 MG: 5 INJECTION, POWDER, LYOPHILIZED, FOR SOLUTION INTRAVENOUS at 13:18

## 2021-02-12 RX ADMIN — AMPICILLIN SODIUM 2000 MG: 2 INJECTION, POWDER, FOR SOLUTION INTRAVENOUS at 12:22

## 2021-02-12 ASSESSMENT — PAIN SCALES - GENERAL
PAINLEVEL_OUTOF10: 3
PAINLEVEL_OUTOF10: 8
PAINLEVEL_OUTOF10: 0

## 2021-02-12 NOTE — PROGRESS NOTES
Infectious Disease Progress Note  2021   Patient Name: Jorge Alberto Yin : 1945       Reason for visit: F/u sepsis secondary to group G streptococcus bacteremia, left foot abscess. ? History:? Interval history noted. Status post left foot I&D on 2/3/2021. Aspiration of left wrist  1 mL of blood  Feels better,   Physical Exam:  Vital Signs: BP (!) 98/58   Pulse 97   Temp 98.6 °F (37 °C) (Oral)   Resp 17   Ht 5' 10\" (1.778 m)   Wt 170 lb 9.6 oz (77.4 kg)   SpO2 95%   BMI 24.48 kg/m²     Gen: alert and oriented X3, no distress  Skin: no stigmata of endocarditis  Wounds: left foot  dorsum of the left first MTP joint  HEMT: AT/NC Oropharynx pink, moist, and without lesions or exudates; dentition in good state of repair  Eyes: PERRLA, EOMI, conjunctiva pink, sclera anicteric. Neck: Supple. Trachea midline. No LAD. Chest: no distress and CTA. Good air movement. Heart: RRR and no MRG. Abd: soft, non-distended, no tenderness, no hepatomegaly. Normoactive bowel sounds. Ext: left wrist swelling  Catheter Site: without erythema or tenderness  LDA: CVC:  Neuro: Mental status intact. CN 2-12 intact and no focal sensory or motor deficits       Radiologic / Imaging / TESTING  THREE XRAY VIEWS OF THE LEFT FOOT     2021 2:41 pm     COMPARISON:   None. 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 There is mild soft tissue   swelling at the level of the 1st MTP joint as well. Deformity of the 5th   proximal phalanx likely related to prior trauma. Mild osteopenia. No acute   fracture or dislocation. Degenerative changes in the midfoot.   Soft tissue   swelling along the dorsum of the foot is also noted. Vascular   calcifications. Calcaneal spur along the plantar aspect. 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 Degenerative changes are also noted in the midfoot. Otherwise no acute osseous abnormality.         Labs:    Recent Results (from the past 24 hour(s))   CBC auto differential    Collection Time: 02/12/21  6:19 AM   Result Value Ref Range    WBC 8.5 4.0 - 10.5 K/CU MM    RBC 4.21 (L) 4.6 - 6.2 M/CU MM    Hemoglobin 14.4 13.5 - 18.0 GM/DL    Hematocrit 43.7 42 - 52 %    .8 (H) 78 - 100 FL    MCH 34.2 (H) 27 - 31 PG    MCHC 33.0 32.0 - 36.0 %    RDW 12.1 11.7 - 14.9 %    Platelets 921 (H) 133 - 440 K/CU MM    MPV 9.4 7.5 - 11.1 FL    Differential Type AUTOMATED DIFFERENTIAL     Segs Relative 76.4 (H) 36 - 66 %    Lymphocytes % 11.2 (L) 24 - 44 %    Monocytes % 10.4 (H) 0 - 4 %    Eosinophils % 0.9 0 - 3 %    Basophils % 0.5 0 - 1 %    Segs Absolute 6.5 K/CU MM    Lymphocytes Absolute 1.0 K/CU MM    Monocytes Absolute 0.9 K/CU MM    Eosinophils Absolute 0.1 K/CU MM    Basophils Absolute 0.0 K/CU MM    Nucleated RBC % 0.0 %    Total Nucleated RBC 0.0 K/CU MM    Total Immature Neutrophil 0.05 K/CU MM    Immature Neutrophil % 0.6 (H) 0 - 0.43 %   Basic metabolic panel    Collection Time: 02/12/21  6:19 AM   Result Value Ref Range    Sodium 133 (L) 135 - 145 MMOL/L    Potassium 4.5 3.5 - 5.1 MMOL/L    Chloride 98 (L) 99 - 110 mMol/L    CO2 27 21 - 32 MMOL/L    Anion Gap 8 4 - 16    BUN 6 6 - 23 MG/DL    CREATININE 0.6 (L) 0.9 - 1.3 MG/DL    Glucose 91 70 - 99 MG/DL    Calcium 8.2 (L) 8.3 - 10.6 MG/DL    GFR Non-African American >60 >60 mL/min/1.73m2    GFR African American >60 >60 mL/min/1.73m2   C-Reactive Protein    Collection Time: 02/12/21  6:19 AM   Result Value Ref Range    CRP, High Sensitivity 50.5 mg/L     CULTURE results: Invalid input(s): BLOOD CULTURE,  URINE CULTURE, SURGICAL CULTURE    Diagnosis:  Patient Active at [place])   Completed antibiotics Zosyn 2/3-4, vancomycin 2/3-4   Diagnostic: Wound culture (2/9/2021)   F/u: left wrist culture   Other:.        Electronically signed by: Electronically signed by Tayla Oliveira MD on 2/12/2021 at 2:01 PM

## 2021-02-12 NOTE — DISCHARGE SUMMARY
Discharge Summary    Name:  Keyshawn Mcclendon /Age/Sex: 1945  (56 y.o. male)   MRN & CSN:  [id number] & [id number] Admission Date/Time: 2021  2:05 PM   Attending:  Mohit Hinse MD Discharging Physician: Mohit Hines MD     Hospital Course:   Keyshawn Mcclendon is a 77 y.o.  male  who presents with Rhabdomyolysis    Septic Arthritis  Left Triquetral fracture (wrist bone), likely from fall on outstretched arm. S/P wrist aspiration  Status post left wrist irrigation and debridement   Found to have EPL rupture traumatic versus infectious  Surgical culture GPC  DC on Cefazolin for 6 weeks     Beta Streptococcus Group G bacteremia with sepsis(1/4 bottles)  Left foot abscess (present on admission)  Located around the Right hallux base. No evidence of osteomyelitis on Xray. S/p I&D on 2/3/21 by Dr. Bairon Hernandez at the bedside  S/p I&D on 21 in OR - 4 cc purulent material expressed - -culture beta strep  ID on board. General surgery on board     Acute metabolic encephalopathy: Could be from delirium. Could also be from infection   history of alcohol use disorder, unlikely withdrawal.     Nontraumatic Rhadomyolysis : Resolved  Mechanical fall at home  Due to Prolonged immobilization after a fall.   No associated CISCO. CK trending down   IVF discontinued. PT/OT-recommend SNF      Hypokalemia  Replace and follow BMP     Hyponatremia: Could be from poor oral intake.     Alcohol use disorder.   Serum alcohol not elevated.    No withdrawal symptoms at this time.   Continue folate and thiamine.      Incidental Right Adrenal nodule - Repeat imaging in 1 year. PCP to f/u.   Abnormal LFTs - from rhabdomyloysis. Constipation - CT abd/pel shows stools in rectal vault. Plan: Miralax PRN    The patient expressed appropriate understanding of and agreement with the discharge recommendations, medications, and plan.      Consults this admission:  IP CONSULT TO SOCIAL WORK  IP CONSULT TO HOSPITALIST  IP CONSULT TO NEUROLOGY  IP CONSULT TO CASE MANAGEMENT  IP CONSULT TO ORTHOPEDIC SURGERY  IP CONSULT TO GENERAL SURGERY  IP CONSULT TO INTERVENTIONAL RADIOLOGY  IP CONSULT TO INFECTIOUS DISEASES  IP CONSULT TO CASE MANAGEMENT    Discharge Instruction:   Follow up appointments: ID, Surgery, Orthopedic Surgery  Primary care physician:  within 2 weeks    Diet:  regular diet   Activity: activity as tolerated  Disposition: Discharged to:   [x]Home, [][facility], []SNF, []Acute Rehab, []Hospice   Condition on discharge: Stable    Discharge Medications:      Najma Ro   Home Medication Instructions MAU:[id number]    Printed on:02/12/21 0596   Medication Information                      ceFAZolin (ANCEF) 1 g injection  Inject 2,000 mg into the muscle every 8 hours             folic acid (FOLVITE) 1 MG tablet  Take 1 tablet by mouth daily             Multiple Vitamins-Minerals (THERAPEUTIC MULTIVITAMIN-MINERALS) tablet  Take 1 tablet by mouth daily             sodium chloride, PF, 0.9 % injection  Infuse 10 mLs intravenously 2 times daily             thiamine 100 MG tablet  Take 1 tablet by mouth daily                 Objective Findings at Discharge:   BP (!) 98/58   Pulse 97   Temp 98.6 °F (37 °C) (Oral)   Resp 17   Ht 5' 10\" (1.778 m)   Wt 170 lb 9.6 oz (77.4 kg)   SpO2 95%   BMI 24.48 kg/m²            GEN    more awake, not in respiratory distress  EYES   Pupils are equally round. No scleral erythema, discharge, or conjunctivitis. HENT  Mucous membranes are moist. Oral pharynx without exudates, no evidence of thrush. NECK  Supple, no apparent thyromegaly or masses. RESP  Clear to auscultation, no wheezes, rales or rhonchi. Symmetric chest movement while on room air. CARDIO/VASC           S1/S2 auscultated. Regular rate without appreciable murmurs, rubs, or gallops. No JVD or carotid bruits. Peripheral pulses equal bilaterally and palpable. No peripheral edema.   GI        Abdomen is soft without significant further evaluation. Diffuse soft tissue swelling around the left wrist.     Xr Hand Left (min 3 Views)    Result Date: 2/1/2021  EXAMINATION: THREE XRAY VIEWS OF THE LEFT HAND; 3 XRAY VIEWS OF THE LEFT WRIST 2/1/2021 2:22 pm COMPARISON: None. HISTORY: ORDERING SYSTEM PROVIDED HISTORY: hand injury TECHNOLOGIST PROVIDED HISTORY: Reason for exam:->hand injury Reason for Exam: hand injury FINDINGS: Triquetral fracture is noted, of uncertain age. There is complete collapse of the capitate. The lunate is not identified. There is also seen suggestion of fracture of the scaphoid. CT scan is recommended for further evaluation. No definite acute fracture or dislocation is seen in the left hand. There is partial thick or osteoarthritis. Vascular calcification is noted. Soft tissue swelling is seen on the dorsum of the wrist.     Triquetral fracture, of uncertain age. Multiple abnormalities in the carpus. CT scan is recommended for further evaluation. Diffuse soft tissue swelling around the left wrist.     Xr Foot Left (min 3 Views)    Result Date: 2/4/2021  EXAMINATION: THREE XRAY VIEWS OF THE LEFT FOOT 2/2/2021 2:41 pm COMPARISON: None. HISTORY: ORDERING SYSTEM PROVIDED HISTORY: Swelling and pain TECHNOLOGIST PROVIDED HISTORY: Reason for exam:->Swelling and pain Reason for Exam: Swelling and pain Acuity: Acute Type of Exam: Initial Additional signs and symptoms: na Relevant Medical/Surgical History: na FINDINGS: Severe degenerative changes of the 1st MTP joint. There is mild soft tissue swelling at the level of the 1st MTP joint as well. Deformity of the 5th proximal phalanx likely related to prior trauma. Mild osteopenia. No acute fracture or dislocation. Degenerative changes in the midfoot. Soft tissue swelling along the dorsum of the foot is also noted. Vascular calcifications. Calcaneal spur along the plantar aspect.      Severe degenerative changes of the 1st MTP joint with associated soft tissue swelling. Degenerative changes are also noted in the midfoot. Otherwise no acute osseous abnormality. Xr Foot Left (min 3 Views)    Result Date: 2/3/2021  EXAMINATION: THREE XRAY VIEWS OF THE LEFT FOOT 2/3/2021 5:55 am COMPARISON: 02/02/2021 HISTORY: ORDERING SYSTEM PROVIDED HISTORY: fall TECHNOLOGIST PROVIDED HISTORY: Reason for exam:->fall Reason for Exam: fall Acuity: Acute Type of Exam: Initial Relevant Medical/Surgical History: Best possible images, pt uncooperative FINDINGS: The bones are osteopenic. Osteoarthritis 1st metatarsophalangeal joint is present. No acute fracture is seen. Plantar calcaneal spur is present. Atherosclerotic vascular calcifications are noted. Arthritic changes of the hindfoot are present. No acute osseous injury of the left foot. Osteopenia with osteoarthritis most pronounced at the 1st metatarsophalangeal joint. Ct Head Wo Contrast    Result Date: 2/1/2021  EXAMINATION: CT OF THE HEAD WITHOUT CONTRAST  2/1/2021 2:29 pm TECHNIQUE: CT of the head was performed without the administration of intravenous contrast. Dose modulation, iterative reconstruction, and/or weight based adjustment of the mA/kV was utilized to reduce the radiation dose to as low as reasonably achievable. COMPARISON: None. HISTORY: ORDERING SYSTEM PROVIDED HISTORY: trauma TECHNOLOGIST PROVIDED HISTORY: Has a \"code stroke\" or \"stroke alert\" been called? ->No Reason for exam:->trauma Reason for Exam: FALL, TRAUMA  ALERT Acuity: Acute Type of Exam: Initial Mechanism of Injury: FALL Relevant Medical/Surgical History: POOR HISTORIAN FINDINGS: BRAIN/VENTRICLES: There is no acute intracranial hemorrhage, mass effect or midline shift. No abnormal extra-axial fluid collection. The gray-white differentiation is maintained without evidence of an acute infarct. There is no evidence of hydrocephalus. The cerebral sulci and ventricles are enlarged.  There is low-attenuation within the periventricular white matter weight based adjustment of the mA/kV was utilized to reduce the radiation dose to as low as reasonably achievable. COMPARISON: None. HISTORY: ORDERING SYSTEM PROVIDED HISTORY: pain, tachycardia TECHNOLOGIST PROVIDED HISTORY: Reason for exam:->pain, tachycardia FINDINGS: Lower Chest: Dependent atelectasis at the partially imaged lung bases. Calcified mediastinal and hilar nodes consistent with sequela of remote granulomatous disease. Coronary artery atherosclerotic disease partially imaged which is moderate to severe. Mild atherosclerotic plaque of the partially imaged descending thoracic aorta. Organs: Liver appears unremarkable. Cholelithiasis. Spleen, pancreas, and bilateral adrenal glands demonstrate no acute abnormality. Small 1.1 cm indeterminate right adrenal nodule (image 58 series 606). Kidneys enhance symmetrically. Atherosclerotic vascular calcification noted at the kidneys bilaterally. No definite renal stone. No obstructive uropathy. No hydronephrosis. GI/Bowel: No bowel obstruction identified. Moderate amount of stool is present the rectal vault. Small bowel is normal in caliber. Pelvis: Bladder is partially collapsed therefore not well evaluated. Circumferential bladder wall thickening may be related to partial collapse, however, recommend clinical correlation to exclude cystitis. Peritoneum/Retroperitoneum: Abdominal aorta is normal in caliber with severe atherosclerotic plaque throughout. No retroperitoneal adenopathy. Severe atherosclerotic plaque noted at the origin of the SMA and extending along the proximal to midportion of the SMA resulting in mild multifocal stenosis. No free air or free fluid identified within the abdomen and pelvis. Bones/Soft Tissues: Soft tissues demonstrate mild anasarca. Osseous structures appear intact with multilevel degenerative changes of the lumbar spine. Small articular body noted at the right hip joint.   Mild degenerative changes of the bilateral hips.  Small fat filled right inguinal hernia. 1. No acute intra-abdominal process identified. 2. Moderate amount of stool at the rectal vault. 3. Severe atherosclerotic disease. 4. Cholelithiasis. 5. 1.1 cm indeterminate right adrenal nodule. Given size and Hounsfield units, findings reflect probable adenoma. Current guidelines recommend 1 year follow-up adrenal washout CT. If stable greater than or equal to 1 year, no further follow-up imaging. Fl Less Than 1 Hour    Result Date: 2/4/2021  EXAMINATION: Left wrist joint aspiration under fluoroscopy 2/4/2021 10:37 am FLUOROSCOPY DOSE AND TYPE OR TIME AND EXPOSURES: Total fluoroscopy time was 2.5 minutes. The dose area product was 40 micro gray per square meter. The entrance dose was 7.2 mGy. COMPARISON: None HISTORY: ORDERING SYSTEM PROVIDED HISTORY: LEFT WRIST ASPIRATION EVAL FOR SEPTIC JOINT. ... CELL COUNT AND CULTURES TECHNOLOGIST PROVIDED HISTORY: Reason for exam:->LEFT WRIST ASPIRATION EVAL FOR SEPTIC JOINT. ... CELL COUNT AND CULTURES Reason for Exam: LEFT WRIST ASPIRATION EVAL FOR SEPTIC JOINT. ... CELL COUNT AND CULTURES Acuity: Acute Type of Exam: Initial Intraprocedural imaging. FINDINGS: The patient was supine on the fluoroscopy table with the left wrist prone. The skin was prepped with Betadine and draped in a sterile manner. Following local anesthesia using 1% lidocaine, I used a 21 gauge butterfly needle and under fluoroscopic guidance, directed the needle into the medial left radial carpal joint. The lateral radial carpal joint had collapsed due to marked degenerative changes with fragmentation of the lunate and scaphoid. Several aspiration attempts were performed yielding only 1 drop of blood. This was sent to the laboratory for culture. Several aspiration attempts were performed in the left radial carpal joint but no significant fluid was present. Only 1 drop of blood was aspirated and this was sent to the laboratory for culture. Fluoroscopy confirmed the intra articular position of the aspiration needle but no left wrist effusion was encountered. Ct Wrist Left Wo Contrast    Result Date: 2/1/2021  EXAMINATION: CT OF THE LEFT WRIST WITHOUT CONTRAST 2/1/2021 4:03 pm TECHNIQUE: CT of the left wrist was performed without the administration of intravenous contrast.  Multiplanar reformatted images are provided for review. Dose modulation, iterative reconstruction, and/or weight based adjustment of the mA/kV was utilized to reduce the radiation dose to as low as reasonably achievable. COMPARISON: Left hand and wrist radiographs same day HISTORY ORDERING SYSTEM PROVIDED HISTORY: fractures - abnormal xr TECHNOLOGIST PROVIDED HISTORY: Reason for exam:->fractures - abnormal xr Decision Support Exception->Emergency Medical Condition (MA) Reason for Exam: fractures-abnormal xray Acuity: Acute Type of Exam: Initial FINDINGS: Bones: The bones are osteopenic. Chronic partial fragmentation of the lunate with well corticated ossicles present. A few well corticated ossicles extend anteriorly into a moderate sized wrist effusion along the anterior aspect of the joint. No definite acute fracture identified. No comparison exams are available. Malalignment of the wrist as described below. Bones are osteopenic. Well-circumscribed lytic focus of the distal radius may reflect a subchondral cyst.  No cortical breakthrough or suspicious periosteal reaction. Soft Tissue:  Diffuse subcutaneous edema of the distal forearm, wrist, and hand. Atherosclerotic vascular calcifications are present. No organized fluid collection identified within limits of this noncontrast exam.  No subcutaneous gas evident. Joint:  Severe widening of the scapholunate interval with proximal migration of the capitate which is seen to articulate with the distal radius. The scaphoid is also rotated. Fragmentation of the displaced lunate.   This appears chronic and the constellation of findings is most compatible with scapholunate advanced collapse. No remote comparison exams are available. Severe osteoarthritis of the 1st carpometacarpal joint and triscaphe joint. Moderate to severe degenerative changes at the radiocarpal joint. Severe degenerative change of the 1st carpometacarpal joint. 1. Malalignment of the wrist which has the appearance of scapholunate advanced collapse and is favored to be chronic given the overall appearance. No remote comparison exams are available. Associated fragmentation of the lunate which is also favored to be chronic. No definite acute fracture identified. 2. Severe osteoarthritis of the 1st metacarpophalangeal joint, 1st carpometacarpal joint, and triscaphe joint. 3. Osteopenia. 4. Diffuse soft tissue edema. Xr Chest Portable    Result Date: 2/1/2021  EXAMINATION: ONE XRAY VIEW OF THE CHEST 2/1/2021 11:22 am COMPARISON: None. HISTORY: ORDERING SYSTEM PROVIDED HISTORY: fall, altered TECHNOLOGIST PROVIDED HISTORY: Reason for exam:->fall, altered Reason for Exam: fall, altered FINDINGS: There are low lung volumes with secondary bronchovascular crowding. Chronic interstitial opacities are seen bilaterally. No acute focal infiltrate is identified. The cardial pericardial silhouette is unremarkable for the patient's age. No pneumothorax is identified. No free air. No acute bony abnormality is detected. No acute abnormality identified. Cta Pulmonary W Contrast    Result Date: 2/1/2021  EXAMINATION: CTA OF THE CHEST 2/1/2021 6:28 pm TECHNIQUE: CTA of the chest was performed after the administration of intravenous contrast.  Multiplanar reformatted images are provided for review. MIP images are provided for review. Dose modulation, iterative reconstruction, and/or weight based adjustment of the mA/kV was utilized to reduce the radiation dose to as low as reasonably achievable. COMPARISON: None.  HISTORY: ORDERING SYSTEM PROVIDED HISTORY: possible syncope, tachycardia TECHNOLOGIST PROVIDED HISTORY: Reason for exam:->possible syncope, tachycardia Decision Support Exception->Emergency Medical Condition (MA) FINDINGS: Pulmonary Arteries: Pulmonary arteries are adequately opacified for evaluation. No evidence of intraluminal filling defect to suggest pulmonary embolism. Main pulmonary artery is normal in caliber. Mediastinum: Mild atelectasis at the posterior right lower lung. Heart and pericardium demonstrate no acute abnormality. There is no acute abnormality of the thoracic aorta. Lungs/pleura: The lungs are without acute process. No focal consolidation or pulmonary edema. No evidence of pleural effusion or pneumothorax. Upper Abdomen: Cholelithiasis without CT evidence of acute cholecystitis. The remainder of the visualized portions of the upper abdomen is within normal limits. Soft Tissues/Bones: No acute bone or soft tissue abnormality. No evidence of pulmonary embolism or acute pulmonary abnormality. Cholelithiasis without CT evidence of acute cholecystitis.        Discharge Time of 40  minutes    Electronically signed by May Bhatt MD on 2/12/2021 at 3:32 PM

## 2021-02-12 NOTE — PROGRESS NOTES
Infectious Disease Progress Note  2021   Patient Name: Devante Platt : 1945       Reason for visit: F/u sepsis secondary to group G streptococcus bacteremia, left foot abscess. ? History:? Interval history noted. Status post left foot I&D on 2/3/2021. Aspiration of left wrist  1 mL of blood  Feels better,   Physical Exam:  Vital Signs: BP (!) 98/58   Pulse 97   Temp 98.6 °F (37 °C) (Oral)   Resp 17   Ht 5' 10\" (1.778 m)   Wt 170 lb 9.6 oz (77.4 kg)   SpO2 95%   BMI 24.48 kg/m²     Gen: alert and oriented X3, no distress  Skin: no stigmata of endocarditis  Wounds: left foot  dorsum of the left first MTP joint  HEMT: AT/NC Oropharynx pink, moist, and without lesions or exudates; dentition in good state of repair  Eyes: PERRLA, EOMI, conjunctiva pink, sclera anicteric. Neck: Supple. Trachea midline. No LAD. Chest: no distress and CTA. Good air movement. Heart: RRR and no MRG. Abd: soft, non-distended, no tenderness, no hepatomegaly. Normoactive bowel sounds. Ext: left wrist swelling  Catheter Site: without erythema or tenderness  LDA: CVC:  Neuro: Mental status intact. CN 2-12 intact and no focal sensory or motor deficits       Radiologic / Imaging / TESTING  THREE XRAY VIEWS OF THE LEFT FOOT     2021 2:41 pm     COMPARISON:   None. 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 There is mild soft tissue   swelling at the level of the 1st MTP joint as well. Deformity of the 5th   proximal phalanx likely related to prior trauma. Mild osteopenia. No acute   fracture or dislocation. Degenerative changes in the midfoot.   Soft tissue   swelling along the dorsum of the foot is also noted. Vascular   calcifications. Calcaneal spur along the plantar aspect. 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 Degenerative changes are also noted in the midfoot. Otherwise no acute osseous abnormality.         Labs:    Recent Results (from the past 24 hour(s))   CBC auto differential    Collection Time: 02/12/21  6:19 AM   Result Value Ref Range    WBC 8.5 4.0 - 10.5 K/CU MM    RBC 4.21 (L) 4.6 - 6.2 M/CU MM    Hemoglobin 14.4 13.5 - 18.0 GM/DL    Hematocrit 43.7 42 - 52 %    .8 (H) 78 - 100 FL    MCH 34.2 (H) 27 - 31 PG    MCHC 33.0 32.0 - 36.0 %    RDW 12.1 11.7 - 14.9 %    Platelets 084 (H) 628 - 440 K/CU MM    MPV 9.4 7.5 - 11.1 FL    Differential Type AUTOMATED DIFFERENTIAL     Segs Relative 76.4 (H) 36 - 66 %    Lymphocytes % 11.2 (L) 24 - 44 %    Monocytes % 10.4 (H) 0 - 4 %    Eosinophils % 0.9 0 - 3 %    Basophils % 0.5 0 - 1 %    Segs Absolute 6.5 K/CU MM    Lymphocytes Absolute 1.0 K/CU MM    Monocytes Absolute 0.9 K/CU MM    Eosinophils Absolute 0.1 K/CU MM    Basophils Absolute 0.0 K/CU MM    Nucleated RBC % 0.0 %    Total Nucleated RBC 0.0 K/CU MM    Total Immature Neutrophil 0.05 K/CU MM    Immature Neutrophil % 0.6 (H) 0 - 0.43 %   Basic metabolic panel    Collection Time: 02/12/21  6:19 AM   Result Value Ref Range    Sodium 133 (L) 135 - 145 MMOL/L    Potassium 4.5 3.5 - 5.1 MMOL/L    Chloride 98 (L) 99 - 110 mMol/L    CO2 27 21 - 32 MMOL/L    Anion Gap 8 4 - 16    BUN 6 6 - 23 MG/DL    CREATININE 0.6 (L) 0.9 - 1.3 MG/DL    Glucose 91 70 - 99 MG/DL    Calcium 8.2 (L) 8.3 - 10.6 MG/DL    GFR Non-African American >60 >60 mL/min/1.73m2    GFR African American >60 >60 mL/min/1.73m2   C-Reactive Protein    Collection Time: 02/12/21  6:19 AM   Result Value Ref Range    CRP, High Sensitivity 50.5 mg/L     CULTURE results: Invalid input(s): BLOOD CULTURE,  URINE CULTURE, SURGICAL CULTURE    Diagnosis:  Patient Active

## 2021-02-12 NOTE — PROGRESS NOTES
Physical Therapy    Physical Therapy Treatment Note  Name: Ja Long MRN: [id number] :   1945   Date:  2021   Admission Date: 2021 Room:  [address] [place] [address]     Restrictions/Precautions:        General Precautions, Fall Risk, Splint on LUE    Communication with other providers:  RN    Subjective:  Patient states:  Agreeable to tx session. \"as long as I can do whatever it is you want from this chair\"    Pain:   Location, Type, Intensity (0/10 to 10/10): Has pain but does not rate when questioned    Objective:    Observation:  Pt in chair upon arrival.    Treatment, including education/measures:  Heel Slides x 10  SLR x 10  Marching x 10  LAQ x 10  Abduction x 10  Adduction x 10  Glut Sets x 10  Pt required frequent verbal cues for correct form and redirection of task. Amb not attempted this date due to pt declining further intervention. Safety  Patient left safely in the chair, with call light/phone in reach with alarm applied. Assessment / Impression:       Patient's tolerance of treatment:  fair   Adverse Reaction: none  Significant change in status and impact:  none  Barriers to improvement:  Safety, participation     Plan for Next Session:    Will cont to work towards pt's goals per his tolerance    Time in:    Time out:  1403  Timed treatment minutes:  10  Total treatment time:  10    Previously filed items:  Social/Functional History  Lives With: Alone  Type of Home: House  Home Layout: One level  Home Access: (2 steps to enter)  Bathroom Shower/Tub: Tub/Shower unit  Home Equipment: Rolling walker, 4 wheeled walker, Cane(Pt reports at baseline he ambulates with his RW in the community.  Within the house pt ambulates with a cane.)  Receives Help From: (Pt reports the  at his house checks in on pt as needed.)  ADL Assistance: [facility] [address]: Independent  Homemaking Responsibilities: Yes  Ambulation Assistance: Independent  Transfer Assistance: Independent  Active : Yes  Mode of Transportation: Car  Short term goals  Time Frame for Short term goals: 1 week  Short term goal 1: Pt will perform sit><supine Christal  Short term goal 2: Pt will transition sit><stand CGA  Short term goal 3: Pt will ambulte 20ft with LRAD CGA  Short term goal 4: Pt will ascend/descend 2 steps, single rail Christal       Electronically signed by:    Jack Ramos, VIVIANA  2/12/2021, 2:49 PM

## 2021-02-12 NOTE — PROGRESS NOTES
L wrist feels better  Dressing changed, stiches intact  Dec in swelling and erythema  Improved finger mvt  Cx Gm + cocci  Abx per ID  Follow up in ortho clinic 1 week from Thursday feb 25  Elevate

## 2021-02-13 LAB
SARS-COV-2, NAAT: NOT DETECTED
SOURCE: NORMAL

## 2021-02-14 LAB
CULTURE: ABNORMAL
Lab: ABNORMAL
Lab: ABNORMAL
SPECIMEN: ABNORMAL
SPECIMEN: ABNORMAL

## 2021-02-15 ENCOUNTER — HOSPITAL ENCOUNTER (OUTPATIENT)
Age: 76
Setting detail: SPECIMEN
Discharge: HOME | End: 2021-02-15

## 2021-02-15 LAB
ALBUMIN SERPL-MCNC: 2.9 GM/DL (ref 3.4–5)
ALP BLD-CCNC: 86 IU/L (ref 40–128)
ALT SERPL-CCNC: 13 U/L (ref 10–40)
ANION GAP SERPL CALCULATED.3IONS-SCNC: 9 MMOL/L (ref 4–16)
AST SERPL-CCNC: 14 IU/L (ref 15–37)
BASOPHILS ABSOLUTE: 0.1 K/CU MM
BASOPHILS RELATIVE PERCENT: 1.2 % (ref 0–1)
BILIRUB SERPL-MCNC: 0.7 MG/DL (ref 0–1)
BUN BLDV-MCNC: 9 MG/DL (ref 6–23)
C-REACTIVE PROTEIN, HIGH SENSITIVITY: 41.5 MG/L
CALCIUM SERPL-MCNC: 8.2 MG/DL (ref 8.3–10.6)
CHLORIDE BLD-SCNC: 96 MMOL/L (ref 99–110)
CO2: 25 MMOL/L (ref 21–32)
CREAT SERPL-MCNC: 0.6 MG/DL (ref 0.9–1.3)
DIFFERENTIAL TYPE: ABNORMAL
EOSINOPHILS ABSOLUTE: 0.1 K/CU MM
EOSINOPHILS RELATIVE PERCENT: 1.5 % (ref 0–3)
ERYTHROCYTE SEDIMENTATION RATE: 50 MM/HR (ref 0–20)
GFR AFRICAN AMERICAN: >60 ML/MIN/1.73M2
GFR NON-AFRICAN AMERICAN: >60 ML/MIN/1.73M2
GLUCOSE BLD-MCNC: 105 MG/DL (ref 70–99)
HCT VFR BLD CALC: 44.2 % (ref 42–52)
HEMOGLOBIN: 14.8 GM/DL (ref 13.5–18)
IMMATURE NEUTROPHIL %: 0.6 % (ref 0–0.43)
LYMPHOCYTES ABSOLUTE: 1.2 K/CU MM
LYMPHOCYTES RELATIVE PERCENT: 17.9 % (ref 24–44)
MCH RBC QN AUTO: 34.1 PG (ref 27–31)
MCHC RBC AUTO-ENTMCNC: 33.5 % (ref 32–36)
MCV RBC AUTO: 101.8 FL (ref 78–100)
MONOCYTES ABSOLUTE: 0.7 K/CU MM
MONOCYTES RELATIVE PERCENT: 10.6 % (ref 0–4)
NUCLEATED RBC %: 0 %
PDW BLD-RTO: 11.9 % (ref 11.7–14.9)
PLATELET # BLD: 581 K/CU MM (ref 140–440)
PMV BLD AUTO: 9.6 FL (ref 7.5–11.1)
POTASSIUM SERPL-SCNC: 4.6 MMOL/L (ref 3.5–5.1)
RBC # BLD: 4.34 M/CU MM (ref 4.6–6.2)
SEGMENTED NEUTROPHILS ABSOLUTE COUNT: 4.5 K/CU MM
SEGMENTED NEUTROPHILS RELATIVE PERCENT: 68.2 % (ref 36–66)
SODIUM BLD-SCNC: 130 MMOL/L (ref 135–145)
TOTAL IMMATURE NEUTOROPHIL: 0.04 K/CU MM
TOTAL NUCLEATED RBC: 0 K/CU MM
TOTAL PROTEIN: 6.2 GM/DL (ref 6.4–8.2)
WBC # BLD: 6.6 K/CU MM (ref 4–10.5)

## 2021-02-15 PROCEDURE — 85652 RBC SED RATE AUTOMATED: CPT

## 2021-02-15 PROCEDURE — 80053 COMPREHEN METABOLIC PANEL: CPT

## 2021-02-15 PROCEDURE — 86140 C-REACTIVE PROTEIN: CPT

## 2021-02-15 PROCEDURE — 85025 COMPLETE CBC W/AUTO DIFF WBC: CPT

## 2021-02-15 PROCEDURE — 36415 COLL VENOUS BLD VENIPUNCTURE: CPT

## 2021-02-18 NOTE — OP NOTE
Operative Note      Patient: Pradeep Boone  YOB: 1945  MRN: [id number]    Date of Procedure: 2/9/2021    Pre-Op Diagnosis: left wrist infection    Post-Op Diagnosis: same, EPL insufficiency        Procedure(s):  LEFT WRIST IRRIGATION AND DEBRIDEMENT, EXAM UNDER FLUOROSCOPY    Surgeon(s):  Gaby Ro MD    Assistant:   None    Anesthesia: General    Estimated Blood Loss (mL): 12AR    Complications: none    Specimens:   ID Type Source Tests Collected by Time Destination   1 : left wrist fliud  Joint/Joint Fluid Joint Fluid CSF CELL COUNT WITH DIFFERENTIAL (Canceled), GRAM STAIN (Canceled), CULTURE, BODY FLUID Gaby Ro MD 2/9/2021 1445    2 : wrist  Tissue Tissue CULTURE, TISSUE Gaby Ro MD 2/9/2021 1504        Implants:none      Drains: none    Findings: fluid in joint    Detailed Description of Procedure: The patient was brought to the OR suite positioned supine on the OR table with and adjacent hand table. A well padded tourniquet was placed and the left arm was prepped and draped in the usual sterile fashion. A time out was done to verify correct operative site and the patient was on scheduled abx. The wrist was examined under fluoroscopy on both AP and Lat views as well as in real time motion. Remnants of the proximal pole of the saphoid and lunate were noted with chronic SLAC wrist changes noted. The capitate had migrated into the lunate facet of the distal radius. No complete subluxation was noted. A 19 ga needle was used to aspirate the joint and the fluid was sent for culture, and cell count. We got 1-2 cc of blood tinged yellow fluid. A 4-5 cm incision was made after exsanguination and tourniquet elevation to 250. A standard dorsal approach was made between extensor compartment 3/4. After incision only the skin, the soft tissues were spread with fine tip scissors. The distal stump of a ruptured EPL tendon was noted.  The 3-4 interval was developed and a longitudinal capsulotomy was carried out. The joint was distracted and irrigated with sterile saline using an angio cath with sterile saline. Cultures were taken. The tourniquet was deflated and the skin was re approximated with 3.0 nylon suture. Sterile technique was maintained. Sterile bandages were applied and the patient was placed in a volar fiberglass splint. He tolerated the procedure well, ebl was 25 cc. Pt underwent reversal of anesthesia and was transferred to pacu in good condition.     Electronically signed by Brien Drummond MD on 2/18/2021 at 7:46 AM

## 2021-02-22 ENCOUNTER — HOSPITAL ENCOUNTER (OUTPATIENT)
Age: 76
Setting detail: SPECIMEN
Discharge: HOME | End: 2021-02-22

## 2021-02-22 LAB
ALBUMIN SERPL-MCNC: 3.2 GM/DL (ref 3.4–5)
ALP BLD-CCNC: 108 IU/L (ref 40–128)
ALT SERPL-CCNC: 8 U/L (ref 10–40)
ANION GAP SERPL CALCULATED.3IONS-SCNC: 11 MMOL/L (ref 4–16)
AST SERPL-CCNC: 19 IU/L (ref 15–37)
BASOPHILS ABSOLUTE: 0.1 K/CU MM
BASOPHILS RELATIVE PERCENT: 1 % (ref 0–1)
BILIRUB SERPL-MCNC: 0.6 MG/DL (ref 0–1)
BUN BLDV-MCNC: 15 MG/DL (ref 6–23)
C-REACTIVE PROTEIN, HIGH SENSITIVITY: 16 MG/L
CALCIUM SERPL-MCNC: 8.6 MG/DL (ref 8.3–10.6)
CHLORIDE BLD-SCNC: 99 MMOL/L (ref 99–110)
CO2: 27 MMOL/L (ref 21–32)
CREAT SERPL-MCNC: 0.7 MG/DL (ref 0.9–1.3)
DIFFERENTIAL TYPE: ABNORMAL
EOSINOPHILS ABSOLUTE: 0.2 K/CU MM
EOSINOPHILS RELATIVE PERCENT: 1.7 % (ref 0–3)
ERYTHROCYTE SEDIMENTATION RATE: 55 MM/HR (ref 0–20)
GFR AFRICAN AMERICAN: >60 ML/MIN/1.73M2
GFR NON-AFRICAN AMERICAN: >60 ML/MIN/1.73M2
GLUCOSE BLD-MCNC: 91 MG/DL (ref 70–99)
HCT VFR BLD CALC: 45.9 % (ref 42–52)
HEMOGLOBIN: 14.9 GM/DL (ref 13.5–18)
IMMATURE NEUTROPHIL %: 0.4 % (ref 0–0.43)
LYMPHOCYTES ABSOLUTE: 1.2 K/CU MM
LYMPHOCYTES RELATIVE PERCENT: 11.7 % (ref 24–44)
MCH RBC QN AUTO: 33.6 PG (ref 27–31)
MCHC RBC AUTO-ENTMCNC: 32.5 % (ref 32–36)
MCV RBC AUTO: 103.6 FL (ref 78–100)
MONOCYTES ABSOLUTE: 0.7 K/CU MM
MONOCYTES RELATIVE PERCENT: 7.2 % (ref 0–4)
NUCLEATED RBC %: 0 %
PDW BLD-RTO: 12 % (ref 11.7–14.9)
PLATELET # BLD: 355 K/CU MM (ref 140–440)
PMV BLD AUTO: 9.9 FL (ref 7.5–11.1)
POTASSIUM SERPL-SCNC: 4.4 MMOL/L (ref 3.5–5.1)
RBC # BLD: 4.43 M/CU MM (ref 4.6–6.2)
SEGMENTED NEUTROPHILS ABSOLUTE COUNT: 7.9 K/CU MM
SEGMENTED NEUTROPHILS RELATIVE PERCENT: 78 % (ref 36–66)
SODIUM BLD-SCNC: 137 MMOL/L (ref 135–145)
TOTAL IMMATURE NEUTOROPHIL: 0.04 K/CU MM
TOTAL NUCLEATED RBC: 0 K/CU MM
TOTAL PROTEIN: 6.7 GM/DL (ref 6.4–8.2)
WBC # BLD: 10.2 K/CU MM (ref 4–10.5)

## 2021-02-22 PROCEDURE — 85025 COMPLETE CBC W/AUTO DIFF WBC: CPT

## 2021-02-22 PROCEDURE — 86140 C-REACTIVE PROTEIN: CPT

## 2021-02-22 PROCEDURE — 80053 COMPREHEN METABOLIC PANEL: CPT

## 2021-02-22 PROCEDURE — 36415 COLL VENOUS BLD VENIPUNCTURE: CPT

## 2021-02-22 PROCEDURE — 85652 RBC SED RATE AUTOMATED: CPT

## 2021-02-24 NOTE — PROGRESS NOTES
ORTHOPEDIC PROGRESS NOTE    2021    Patient name: Joanna Rice  : 1945      SUBJECTIVE     Chief Complaint   Patient presents with    Post-Op Check     Left wrist I&D DOS 2021     The patient was seen and examined. DOS/DOI: 2021  Procedure/Injury: I&D left wrist by Dr. Dinh Rosales    Complex history of left wrist pain and previous surgery with advanced degenerative changes, SLAC wrist. Two weeks post operative from I&D left wrist with exam under fluoroscopy for left wrist infection vs EPL traumatic rupture. Cultures obtained in OR and results confirm BETA STREP GROUP G. Per brief op note noted proximal migration of capitate with fragmented scaphoid proximal pole. Also had foot I&D, with Group G bacteremia followed by ID, Dr. Neo Barker. Per discharge information, the plan is Ancef x 6 weeks with weekly lab monitoring. He has not followed up with ID since hospital discharge on 2021.     Patient reports he is doing well in regards to the wrist    OBJECTIVE     GEN: A&O, NAD  MS: Swelling into the hand is much improved from inpatient evaluation; dorsal incision is intact and healed with suture; no drainage; the patient does have stiffness with hand digit range of motion with flexor tendinitis    X-rays: None    ASSESSMENT     77 y.o. male, 2 weeks post-surgery/injury    PLAN     - Activity: Weight bearing as tolerated left arm  - Brace: Velcro splint as needed for stability and comfort  - Encourage therapy at facility with hand and wrist range of motion  - Follow-up recommended with infectious disease; no orthopedic follow-up needed with satisfactory exam today      Electronically signed by:Leidy Parks PA-C, 2021 11:35 AM

## 2021-02-25 ENCOUNTER — OFFICE VISIT (OUTPATIENT)
Dept: ORTHOPEDIC SURGERY | Age: 76
End: 2021-02-25

## 2021-02-25 VITALS
HEART RATE: 74 BPM | OXYGEN SATURATION: 98 % | WEIGHT: 170 LBS | BODY MASS INDEX: 24.34 KG/M2 | HEIGHT: 70 IN | RESPIRATION RATE: 16 BRPM

## 2021-02-25 DIAGNOSIS — M19.131 SLAC (SCAPHOLUNATE ADVANCED COLLAPSE) OF WRIST, RIGHT: Primary | ICD-10-CM

## 2021-02-25 PROCEDURE — 99024 POSTOP FOLLOW-UP VISIT: CPT | Performed by: PHYSICIAN ASSISTANT

## 2021-02-25 NOTE — PATIENT INSTRUCTIONS
- Activity: Weight bearing as tolerated left arm  - Brace: Velcro splint as needed for stability and comfort  - Encourage therapy at facility with hand and wrist range of motion  - Follow-up recommended with infectious disease; no orthopedic follow-up needed with satisfactory exam today

## 2021-03-01 ENCOUNTER — HOSPITAL ENCOUNTER (OUTPATIENT)
Age: 76
Setting detail: SPECIMEN
Discharge: HOME | End: 2021-03-01

## 2021-03-01 LAB
ALBUMIN SERPL-MCNC: 3.3 GM/DL (ref 3.4–5)
ALP BLD-CCNC: 101 IU/L (ref 40–128)
ALT SERPL-CCNC: 5 U/L (ref 10–40)
ANION GAP SERPL CALCULATED.3IONS-SCNC: 11 MMOL/L (ref 4–16)
AST SERPL-CCNC: 19 IU/L (ref 15–37)
BASOPHILS ABSOLUTE: 0.1 K/CU MM
BASOPHILS RELATIVE PERCENT: 1.2 % (ref 0–1)
BILIRUB SERPL-MCNC: 0.8 MG/DL (ref 0–1)
BUN BLDV-MCNC: 16 MG/DL (ref 6–23)
C-REACTIVE PROTEIN, HIGH SENSITIVITY: 22.2 MG/L
CALCIUM SERPL-MCNC: 8.7 MG/DL (ref 8.3–10.6)
CHLORIDE BLD-SCNC: 100 MMOL/L (ref 99–110)
CO2: 26 MMOL/L (ref 21–32)
CREAT SERPL-MCNC: 0.6 MG/DL (ref 0.9–1.3)
CULTURE: NORMAL
DIFFERENTIAL TYPE: ABNORMAL
EOSINOPHILS ABSOLUTE: 0.3 K/CU MM
EOSINOPHILS RELATIVE PERCENT: 4.3 % (ref 0–3)
ERYTHROCYTE SEDIMENTATION RATE: 35 MM/HR (ref 0–20)
GFR AFRICAN AMERICAN: >60 ML/MIN/1.73M2
GFR NON-AFRICAN AMERICAN: >60 ML/MIN/1.73M2
GLUCOSE BLD-MCNC: 95 MG/DL (ref 70–99)
GRAM SMEAR: NORMAL
GRAM SMEAR: NORMAL
HCT VFR BLD CALC: 43.9 % (ref 42–52)
HEMOGLOBIN: 14.3 GM/DL (ref 13.5–18)
IMMATURE NEUTROPHIL %: 0.4 % (ref 0–0.43)
LYMPHOCYTES ABSOLUTE: 1.2 K/CU MM
LYMPHOCYTES RELATIVE PERCENT: 15.3 % (ref 24–44)
Lab: NORMAL
MCH RBC QN AUTO: 33.2 PG (ref 27–31)
MCHC RBC AUTO-ENTMCNC: 32.6 % (ref 32–36)
MCV RBC AUTO: 101.9 FL (ref 78–100)
MONOCYTES ABSOLUTE: 0.7 K/CU MM
MONOCYTES RELATIVE PERCENT: 9.9 % (ref 0–4)
NUCLEATED RBC %: 0 %
PDW BLD-RTO: 12 % (ref 11.7–14.9)
PLATELET # BLD: 261 K/CU MM (ref 140–440)
PMV BLD AUTO: 10.5 FL (ref 7.5–11.1)
POTASSIUM SERPL-SCNC: 4.5 MMOL/L (ref 3.5–5.1)
RBC # BLD: 4.31 M/CU MM (ref 4.6–6.2)
SEGMENTED NEUTROPHILS ABSOLUTE COUNT: 5.2 K/CU MM
SEGMENTED NEUTROPHILS RELATIVE PERCENT: 68.9 % (ref 36–66)
SODIUM BLD-SCNC: 137 MMOL/L (ref 135–145)
SPECIMEN: NORMAL
TOTAL IMMATURE NEUTOROPHIL: 0.03 K/CU MM
TOTAL NUCLEATED RBC: 0 K/CU MM
TOTAL PROTEIN: 6.4 GM/DL (ref 6.4–8.2)
WBC # BLD: 7.5 K/CU MM (ref 4–10.5)

## 2021-03-01 PROCEDURE — 85652 RBC SED RATE AUTOMATED: CPT

## 2021-03-01 PROCEDURE — 86140 C-REACTIVE PROTEIN: CPT

## 2021-03-01 PROCEDURE — 85025 COMPLETE CBC W/AUTO DIFF WBC: CPT

## 2021-03-01 PROCEDURE — 80053 COMPREHEN METABOLIC PANEL: CPT

## 2021-03-01 PROCEDURE — 36415 COLL VENOUS BLD VENIPUNCTURE: CPT

## 2021-03-04 ENCOUNTER — HOSPITAL ENCOUNTER (OUTPATIENT)
Age: 76
Setting detail: SPECIMEN
Discharge: HOME | End: 2021-03-04
Payer: MEDICARE

## 2021-03-04 ENCOUNTER — OFFICE VISIT (OUTPATIENT)
Dept: INFECTIOUS DISEASES | Age: 76
End: 2021-03-04
Payer: MEDICARE

## 2021-03-04 VITALS
DIASTOLIC BLOOD PRESSURE: 87 MMHG | TEMPERATURE: 96.9 F | SYSTOLIC BLOOD PRESSURE: 122 MMHG | HEART RATE: 121 BPM | RESPIRATION RATE: 18 BRPM

## 2021-03-04 DIAGNOSIS — M00.232 STREPTOCOCCAL ARTHRITIS OF LEFT WRIST (HCC): Primary | ICD-10-CM

## 2021-03-04 DIAGNOSIS — Z79.2 RECEIVING INTRAVENOUS ANTIBIOTIC TREATMENT AS OUTPATIENT: ICD-10-CM

## 2021-03-04 DIAGNOSIS — L02.612 FOOT ABSCESS, LEFT: ICD-10-CM

## 2021-03-04 DIAGNOSIS — B95.4 BACTERIAL INFECTION DUE TO STREPTOCOCCUS, GROUP G: ICD-10-CM

## 2021-03-04 PROBLEM — R78.81 BACTEREMIA DUE TO GROUP B STREPTOCOCCUS: Status: RESOLVED | Noted: 2021-02-05 | Resolved: 2021-03-04

## 2021-03-04 PROBLEM — B95.1 BACTEREMIA DUE TO GROUP B STREPTOCOCCUS: Status: RESOLVED | Noted: 2021-02-05 | Resolved: 2021-03-04

## 2021-03-04 PROCEDURE — 99214 OFFICE O/P EST MOD 30 MIN: CPT | Performed by: INTERNAL MEDICINE

## 2021-03-04 PROCEDURE — 87070 CULTURE OTHR SPECIMN AEROBIC: CPT

## 2021-03-04 PROCEDURE — 87075 CULTR BACTERIA EXCEPT BLOOD: CPT

## 2021-03-04 RX ORDER — DOXYCYCLINE HYCLATE 100 MG
100 TABLET ORAL 2 TIMES DAILY
Qty: 28 TABLET | Refills: 0 | Status: SHIPPED | OUTPATIENT
Start: 2021-03-04 | End: 2021-03-18

## 2021-03-04 NOTE — PROGRESS NOTES
3/15/2021         Referring Physician: No ref. provider found  Primary Care Physician: No primary care provider on file. Impression/Plan:   Diagnosis Orders   1. Streptococcal arthritis of left wrist (HCC)  doxycycline hyclate (VIBRA-TABS) 100 MG tablet   2. Receiving intravenous antibiotic treatment as outpatient     3. Bacterial infection due to Streptococcus, group G     4. Foot abscess, left  XR FOOT LEFT (MIN 3 VIEWS)       Discussion:  Streptococcal arthritis of left wrist (HCC)  Concerned about stitch abscesses, wound cx, start doxycycline. Return in about 1 week (around 3/11/2021). History: Pradeep Boone is a 77 y.o.  male presenting today for follow-up. Medical history of alcohol abuse, initially seen in the hospital on 2/4/2021 for sepsis secondary to group G streptococcus bacteremia (erroneous entry GBS in hospital notes). Eventually diagnosed with left wrist septic arthritis and left foot abscess. He underwent an I&D of the left hallux abscess and an I&D under fluoroscopy of the left wrist on 2/9/2021. Was discharged to complete a 6-week course of cefazolin on 3/22/2021. Tolerating abx, denies n/v/d/f. Review of Systems   All other systems reviewed and are negative.       No Known Allergies    Patient Active Problem List   Diagnosis    Rhabdomyolysis    Foot abscess, left    Sepsis (HCC)    Pain and swelling of left wrist    Slac (scapholunate advanced collapse) of wrist, right    Streptococcal arthritis of left wrist (HCC)    Receiving intravenous antibiotic treatment as outpatient    Bacterial infection due to Streptococcus, group G       Current Outpatient Medications   Medication Sig Dispense Refill    doxycycline hyclate (VIBRA-TABS) 100 MG tablet Take 1 tablet by mouth 2 times daily for 14 days 28 tablet 0    sodium chloride, PF, 0.9 % injection Infuse 10 mLs intravenously 2 times daily 1 vial 5    folic acid (FOLVITE) 1 MG tablet Take 1 tablet by mouth hours No anaerobes isolated   02/09/2021 Final Report No anaerobes isolated  Final   02/09/2021 (A)  Final    BETA STREP GROUP G Rare growth Susceptibility testing of penicillin and other beta lactams is not necessary for beta hemolytic Streptococci since resistant strains have not been identified.  (CLSI M100)       Imaging Studies:         Electronicallysigned by Saurabh Wolf MD on 3/4/21 at 5:34 AM EST

## 2021-03-08 ENCOUNTER — HOSPITAL ENCOUNTER (OUTPATIENT)
Age: 76
Setting detail: SPECIMEN
Discharge: HOME | End: 2021-03-08

## 2021-03-08 LAB
ALBUMIN SERPL-MCNC: 3.2 GM/DL (ref 3.4–5)
ALP BLD-CCNC: 119 IU/L (ref 40–128)
ALT SERPL-CCNC: <5 U/L (ref 10–40)
ANION GAP SERPL CALCULATED.3IONS-SCNC: 8 MMOL/L (ref 4–16)
AST SERPL-CCNC: 17 IU/L (ref 15–37)
BASOPHILS ABSOLUTE: 0.1 K/CU MM
BASOPHILS RELATIVE PERCENT: 0.9 % (ref 0–1)
BILIRUB SERPL-MCNC: 0.7 MG/DL (ref 0–1)
BUN BLDV-MCNC: 12 MG/DL (ref 6–23)
C-REACTIVE PROTEIN, HIGH SENSITIVITY: 50.8 MG/L
CALCIUM SERPL-MCNC: 8.9 MG/DL (ref 8.3–10.6)
CHLORIDE BLD-SCNC: 96 MMOL/L (ref 99–110)
CO2: 29 MMOL/L (ref 21–32)
CREAT SERPL-MCNC: 0.6 MG/DL (ref 0.9–1.3)
DIFFERENTIAL TYPE: ABNORMAL
EOSINOPHILS ABSOLUTE: 0.2 K/CU MM
EOSINOPHILS RELATIVE PERCENT: 3 % (ref 0–3)
ERYTHROCYTE SEDIMENTATION RATE: 75 MM/HR (ref 0–20)
GFR AFRICAN AMERICAN: >60 ML/MIN/1.73M2
GFR NON-AFRICAN AMERICAN: >60 ML/MIN/1.73M2
GLUCOSE BLD-MCNC: 88 MG/DL (ref 70–99)
HCT VFR BLD CALC: 42.2 % (ref 42–52)
HEMOGLOBIN: 14 GM/DL (ref 13.5–18)
IMMATURE NEUTROPHIL %: 0.4 % (ref 0–0.43)
LYMPHOCYTES ABSOLUTE: 1.3 K/CU MM
LYMPHOCYTES RELATIVE PERCENT: 18 % (ref 24–44)
MCH RBC QN AUTO: 33.6 PG (ref 27–31)
MCHC RBC AUTO-ENTMCNC: 33.2 % (ref 32–36)
MCV RBC AUTO: 101.2 FL (ref 78–100)
MONOCYTES ABSOLUTE: 0.8 K/CU MM
MONOCYTES RELATIVE PERCENT: 10.9 % (ref 0–4)
NUCLEATED RBC %: 0 %
PDW BLD-RTO: 12.1 % (ref 11.7–14.9)
PLATELET # BLD: 275 K/CU MM (ref 140–440)
PMV BLD AUTO: 10.3 FL (ref 7.5–11.1)
POTASSIUM SERPL-SCNC: 4.7 MMOL/L (ref 3.5–5.1)
RBC # BLD: 4.17 M/CU MM (ref 4.6–6.2)
SEGMENTED NEUTROPHILS ABSOLUTE COUNT: 4.7 K/CU MM
SEGMENTED NEUTROPHILS RELATIVE PERCENT: 66.8 % (ref 36–66)
SODIUM BLD-SCNC: 133 MMOL/L (ref 135–145)
TOTAL IMMATURE NEUTOROPHIL: 0.03 K/CU MM
TOTAL NUCLEATED RBC: 0 K/CU MM
TOTAL PROTEIN: 6.4 GM/DL (ref 6.4–8.2)
WBC # BLD: 7 K/CU MM (ref 4–10.5)

## 2021-03-08 PROCEDURE — 85025 COMPLETE CBC W/AUTO DIFF WBC: CPT

## 2021-03-08 PROCEDURE — 86140 C-REACTIVE PROTEIN: CPT

## 2021-03-08 PROCEDURE — 36415 COLL VENOUS BLD VENIPUNCTURE: CPT

## 2021-03-08 PROCEDURE — 85652 RBC SED RATE AUTOMATED: CPT

## 2021-03-08 PROCEDURE — 80053 COMPREHEN METABOLIC PANEL: CPT

## 2021-03-10 LAB
CULTURE: NORMAL
Lab: NORMAL
SPECIMEN: NORMAL

## 2021-03-11 ENCOUNTER — HOSPITAL ENCOUNTER (OUTPATIENT)
Age: 76
Setting detail: SPECIMEN
Discharge: HOME | End: 2021-03-11

## 2021-03-11 LAB
ALBUMIN SERPL-MCNC: 2.8 GM/DL (ref 3.4–5)
ALP BLD-CCNC: 109 IU/L (ref 40–129)
ALT SERPL-CCNC: <5 U/L (ref 10–40)
ANION GAP SERPL CALCULATED.3IONS-SCNC: 7 MMOL/L (ref 4–16)
AST SERPL-CCNC: 12 IU/L (ref 15–37)
BILIRUB SERPL-MCNC: 0.5 MG/DL (ref 0–1)
BUN BLDV-MCNC: 12 MG/DL (ref 6–23)
CALCIUM SERPL-MCNC: 8.8 MG/DL (ref 8.3–10.6)
CHLORIDE BLD-SCNC: 97 MMOL/L (ref 99–110)
CO2: 30 MMOL/L (ref 21–32)
CREAT SERPL-MCNC: 0.6 MG/DL (ref 0.9–1.3)
GFR AFRICAN AMERICAN: >60 ML/MIN/1.73M2
GFR NON-AFRICAN AMERICAN: >60 ML/MIN/1.73M2
GLUCOSE BLD-MCNC: 83 MG/DL (ref 70–99)
HCT VFR BLD CALC: 41.5 % (ref 42–52)
HEMOGLOBIN: 13.5 GM/DL (ref 13.5–18)
MCH RBC QN AUTO: 33.2 PG (ref 27–31)
MCHC RBC AUTO-ENTMCNC: 32.5 % (ref 32–36)
MCV RBC AUTO: 102 FL (ref 78–100)
PDW BLD-RTO: 12.2 % (ref 11.7–14.9)
PLATELET # BLD: 274 K/CU MM (ref 140–440)
PMV BLD AUTO: 10.2 FL (ref 7.5–11.1)
POTASSIUM SERPL-SCNC: 4.2 MMOL/L (ref 3.5–5.1)
RBC # BLD: 4.07 M/CU MM (ref 4.6–6.2)
SODIUM BLD-SCNC: 134 MMOL/L (ref 135–145)
TOTAL PROTEIN: 5.9 GM/DL (ref 6.4–8.2)
WBC # BLD: 6.5 K/CU MM (ref 4–10.5)

## 2021-03-11 PROCEDURE — 80053 COMPREHEN METABOLIC PANEL: CPT

## 2021-03-11 PROCEDURE — 85027 COMPLETE CBC AUTOMATED: CPT

## 2021-03-11 PROCEDURE — 36415 COLL VENOUS BLD VENIPUNCTURE: CPT

## 2021-03-15 ENCOUNTER — HOSPITAL ENCOUNTER (OUTPATIENT)
Age: 76
Setting detail: SPECIMEN
Discharge: HOME | End: 2021-03-15
Payer: MEDICARE

## 2021-03-15 LAB
ALBUMIN SERPL-MCNC: 2.9 GM/DL (ref 3.4–5)
ALP BLD-CCNC: 109 IU/L (ref 40–129)
ALT SERPL-CCNC: <5 U/L (ref 10–40)
ANION GAP SERPL CALCULATED.3IONS-SCNC: 9 MMOL/L (ref 4–16)
AST SERPL-CCNC: 13 IU/L (ref 15–37)
BASOPHILS ABSOLUTE: 0.1 K/CU MM
BASOPHILS RELATIVE PERCENT: 1.3 % (ref 0–1)
BILIRUB SERPL-MCNC: 0.6 MG/DL (ref 0–1)
BUN BLDV-MCNC: 9 MG/DL (ref 6–23)
C-REACTIVE PROTEIN, HIGH SENSITIVITY: 56.8 MG/L
CALCIUM SERPL-MCNC: 8.9 MG/DL (ref 8.3–10.6)
CHLORIDE BLD-SCNC: 95 MMOL/L (ref 99–110)
CO2: 29 MMOL/L (ref 21–32)
CREAT SERPL-MCNC: 0.6 MG/DL (ref 0.9–1.3)
DIFFERENTIAL TYPE: ABNORMAL
EOSINOPHILS ABSOLUTE: 0.2 K/CU MM
EOSINOPHILS RELATIVE PERCENT: 3.2 % (ref 0–3)
ERYTHROCYTE SEDIMENTATION RATE: 75 MM/HR (ref 0–20)
GFR AFRICAN AMERICAN: >60 ML/MIN/1.73M2
GFR NON-AFRICAN AMERICAN: >60 ML/MIN/1.73M2
GLUCOSE BLD-MCNC: 86 MG/DL (ref 70–99)
HCT VFR BLD CALC: 41.7 % (ref 42–52)
HEMOGLOBIN: 13.3 GM/DL (ref 13.5–18)
IMMATURE NEUTROPHIL %: 0.3 % (ref 0–0.43)
LYMPHOCYTES ABSOLUTE: 1.2 K/CU MM
LYMPHOCYTES RELATIVE PERCENT: 18.1 % (ref 24–44)
MCH RBC QN AUTO: 32.5 PG (ref 27–31)
MCHC RBC AUTO-ENTMCNC: 31.9 % (ref 32–36)
MCV RBC AUTO: 102 FL (ref 78–100)
MONOCYTES ABSOLUTE: 0.6 K/CU MM
MONOCYTES RELATIVE PERCENT: 9.6 % (ref 0–4)
NUCLEATED RBC %: 0 %
PDW BLD-RTO: 12.2 % (ref 11.7–14.9)
PLATELET # BLD: 284 K/CU MM (ref 140–440)
PMV BLD AUTO: 10.4 FL (ref 7.5–11.1)
POTASSIUM SERPL-SCNC: 4.6 MMOL/L (ref 3.5–5.1)
RBC # BLD: 4.09 M/CU MM (ref 4.6–6.2)
SEGMENTED NEUTROPHILS ABSOLUTE COUNT: 4.3 K/CU MM
SEGMENTED NEUTROPHILS RELATIVE PERCENT: 67.5 % (ref 36–66)
SODIUM BLD-SCNC: 133 MMOL/L (ref 135–145)
TOTAL IMMATURE NEUTOROPHIL: 0.02 K/CU MM
TOTAL NUCLEATED RBC: 0 K/CU MM
TOTAL PROTEIN: 6.1 GM/DL (ref 6.4–8.2)
WBC # BLD: 6.3 K/CU MM (ref 4–10.5)

## 2021-03-15 PROCEDURE — 85652 RBC SED RATE AUTOMATED: CPT

## 2021-03-15 PROCEDURE — 36415 COLL VENOUS BLD VENIPUNCTURE: CPT

## 2021-03-15 PROCEDURE — 86140 C-REACTIVE PROTEIN: CPT

## 2021-03-15 PROCEDURE — 80053 COMPREHEN METABOLIC PANEL: CPT

## 2021-03-15 PROCEDURE — 85025 COMPLETE CBC W/AUTO DIFF WBC: CPT

## 2021-03-22 ENCOUNTER — HOSPITAL ENCOUNTER (OUTPATIENT)
Age: 76
Setting detail: SPECIMEN
Discharge: HOME | End: 2021-03-22

## 2021-03-22 LAB
ALBUMIN SERPL-MCNC: 2.9 GM/DL (ref 3.4–5)
ALP BLD-CCNC: 100 IU/L (ref 40–129)
ALT SERPL-CCNC: <5 U/L (ref 10–40)
ANION GAP SERPL CALCULATED.3IONS-SCNC: 10 MMOL/L (ref 4–16)
AST SERPL-CCNC: 10 IU/L (ref 15–37)
BASOPHILS ABSOLUTE: 0.1 K/CU MM
BASOPHILS RELATIVE PERCENT: 1.1 % (ref 0–1)
BILIRUB SERPL-MCNC: 0.8 MG/DL (ref 0–1)
BUN BLDV-MCNC: 9 MG/DL (ref 6–23)
C-REACTIVE PROTEIN, HIGH SENSITIVITY: 58.2 MG/L
CALCIUM SERPL-MCNC: 8.6 MG/DL (ref 8.3–10.6)
CHLORIDE BLD-SCNC: 96 MMOL/L (ref 99–110)
CO2: 28 MMOL/L (ref 21–32)
CREAT SERPL-MCNC: 0.6 MG/DL (ref 0.9–1.3)
DIFFERENTIAL TYPE: ABNORMAL
EOSINOPHILS ABSOLUTE: 0.2 K/CU MM
EOSINOPHILS RELATIVE PERCENT: 2.7 % (ref 0–3)
ERYTHROCYTE SEDIMENTATION RATE: 70 MM/HR (ref 0–20)
GFR AFRICAN AMERICAN: >60 ML/MIN/1.73M2
GFR NON-AFRICAN AMERICAN: >60 ML/MIN/1.73M2
GLUCOSE BLD-MCNC: 87 MG/DL (ref 70–99)
HCT VFR BLD CALC: 42.2 % (ref 42–52)
HEMOGLOBIN: 13.4 GM/DL (ref 13.5–18)
IMMATURE NEUTROPHIL %: 0.3 % (ref 0–0.43)
LYMPHOCYTES ABSOLUTE: 1 K/CU MM
LYMPHOCYTES RELATIVE PERCENT: 13.1 % (ref 24–44)
MCH RBC QN AUTO: 32.4 PG (ref 27–31)
MCHC RBC AUTO-ENTMCNC: 31.8 % (ref 32–36)
MCV RBC AUTO: 102.2 FL (ref 78–100)
MONOCYTES ABSOLUTE: 0.8 K/CU MM
MONOCYTES RELATIVE PERCENT: 10.5 % (ref 0–4)
NUCLEATED RBC %: 0 %
PDW BLD-RTO: 12.3 % (ref 11.7–14.9)
PLATELET # BLD: 287 K/CU MM (ref 140–440)
PMV BLD AUTO: 10.5 FL (ref 7.5–11.1)
POTASSIUM SERPL-SCNC: 4.4 MMOL/L (ref 3.5–5.1)
RBC # BLD: 4.13 M/CU MM (ref 4.6–6.2)
SEGMENTED NEUTROPHILS ABSOLUTE COUNT: 5.4 K/CU MM
SEGMENTED NEUTROPHILS RELATIVE PERCENT: 72.3 % (ref 36–66)
SODIUM BLD-SCNC: 134 MMOL/L (ref 135–145)
TOTAL IMMATURE NEUTOROPHIL: 0.02 K/CU MM
TOTAL NUCLEATED RBC: 0 K/CU MM
TOTAL PROTEIN: 5.9 GM/DL (ref 6.4–8.2)
WBC # BLD: 7.4 K/CU MM (ref 4–10.5)

## 2021-03-22 PROCEDURE — 85025 COMPLETE CBC W/AUTO DIFF WBC: CPT

## 2021-03-22 PROCEDURE — 86140 C-REACTIVE PROTEIN: CPT

## 2021-03-22 PROCEDURE — 80053 COMPREHEN METABOLIC PANEL: CPT

## 2021-03-22 PROCEDURE — 36415 COLL VENOUS BLD VENIPUNCTURE: CPT

## 2021-03-22 PROCEDURE — 85652 RBC SED RATE AUTOMATED: CPT

## 2021-03-24 ENCOUNTER — OFFICE VISIT (OUTPATIENT)
Dept: INFECTIOUS DISEASES | Age: 76
End: 2021-03-24
Payer: MEDICARE

## 2021-03-24 VITALS
SYSTOLIC BLOOD PRESSURE: 89 MMHG | DIASTOLIC BLOOD PRESSURE: 58 MMHG | HEART RATE: 98 BPM | RESPIRATION RATE: 16 BRPM | TEMPERATURE: 97 F

## 2021-03-24 DIAGNOSIS — M00.232 STREPTOCOCCAL ARTHRITIS OF LEFT WRIST (HCC): Primary | ICD-10-CM

## 2021-03-24 DIAGNOSIS — B95.4 BACTERIAL INFECTION DUE TO STREPTOCOCCUS, GROUP G: ICD-10-CM

## 2021-03-24 PROCEDURE — 99214 OFFICE O/P EST MOD 30 MIN: CPT | Performed by: INTERNAL MEDICINE

## 2021-03-24 RX ORDER — CEFUROXIME AXETIL 500 MG/1
500 TABLET ORAL 2 TIMES DAILY
Qty: 84 TABLET | Refills: 0 | Status: SHIPPED | OUTPATIENT
Start: 2021-03-24 | End: 2021-05-05

## 2021-03-24 NOTE — PROGRESS NOTES
sodium chloride, PF, 0.9 % injection Infuse 10 mLs intravenously 2 times daily 1 vial 5    folic acid (FOLVITE) 1 MG tablet Take 1 tablet by mouth daily 30 tablet 3    Multiple Vitamins-Minerals (THERAPEUTIC MULTIVITAMIN-MINERALS) tablet Take 1 tablet by mouth daily 30 tablet 0    thiamine 100 MG tablet Take 1 tablet by mouth daily 30 tablet 3    sodium chloride, PF, 0.9 % injection Infuse 10 mLs intravenously 2 times daily 1 vial 5     No current facility-administered medications for this visit.         Past Medical History:   Diagnosis Date    Alcohol abuse     Fall in elderly patient     Smoking history     Tobacco abuse        Past Surgical History:   Procedure Laterality Date    FOOT DEBRIDEMENT Left 2/5/2021    FOOT DEBRIDEMENT INCISION AND DRAINAGE performed by Citlali Coker MD at [address] [place] [address] Left 2/9/2021    LEFT WRIST IRRIGATION AND DEBRIDEMENT, EXAM UNDER FLUOROSCOPY performed by Xiomara Block MD at [address] [place] [address] S History     Socioeconomic History    Marital status: Single     Spouse name: Not on file    Number of children: Not on file    Years of education: Not on file    Highest education level: Not on file   Occupational History    Not on file   Social Needs    Financial resource strain: Not on file    Food insecurity     Worry: Not on file     Inability: Not on file    Transportation needs     Medical: Not on file     Non-medical: Not on file   Tobacco Use    Smoking status: Former Smoker     Packs/day: 0.00     Types: Cigarettes    Smokeless tobacco: Never Used   Substance and Sexual Activity    Alcohol use: Not on file    Drug use: Not on file    Sexual activity: Not on file   Lifestyle    Physical activity     Days per week: Not on file     Minutes per session: Not on file    Stress: Not on file   Relationships    Social connections     Talks on phone: Not on file     Gets together: Not on file     Attends [employer] service: Not on file     Active member of club or organization: Not on file     Attends meetings of clubs or organizations: Not on file     Relationship status: Not on file    Intimate partner violence     Fear of current or ex partner: Not on file     Emotionally abused: Not on file     Physically abused: Not on file     Forced sexual activity: Not on file   Other Topics Concern    Not on file   Social History Narrative    Not on file       No family history on file. Vital Signs:  Vitals:    03/24/21 1116   BP: (!) 89/58   Pulse: 98   Resp: 16   Temp: 97 °F (36.1 °C)   TempSrc: Infrared        Wt Readings from Last 3 Encounters:   02/25/21 170 lb (77.1 kg)   02/11/21 170 lb 9.6 oz (77.4 kg)        Physical Exam:   Gen: alert and NAD  HEENT: sclera clear, pupils equal and reactive, extra ocular muscles intact, oropharynx clear, mucus membranes moist, tympanic membranes clear bilaterally, no cervical lymphadenopathy noted and neck supple  Neck: supple, no significant adenopathy  Chest: clear to auscultation, no wheezes, rales or rhonchi, symmetric air entry  Heart: regular rate and rhythm, no murmurs  ABD: abdomen is soft without significant tenderness, masses, organomegaly or guarding. EXT:peripheral pulses normal, no pedal edema, no clubbing or cyanosis  NEURO: alert, oriented, normal speech, no focal findings or movement disorder noted  Skin: well hydrated, no lesions, surgical site examined  Wounds: Left wrist deformity, left foot dorsal ulcer over 1st metatarsal healing, no tenderness.    Labs:   WBC   Date Value Ref Range Status   03/22/2021 7.4 4.0 - 10.5 K/CU MM Final   03/15/2021 6.3 4.0 - 10.5 K/CU MM Final   03/11/2021 6.5 4.0 - 10.5 K/CU MM Final     CREATININE   Date Value Ref Range Status   03/22/2021 0.6 (L) 0.9 - 1.3 MG/DL Final   03/15/2021 0.6 (L) 0.9 - 1.3 MG/DL Final   03/11/2021 0.6 (L) 0.9 - 1.3 MG/DL Final       Cultures:  Culture   Date Value Ref Range Status   03/04/2021   Final    Final Report No growth 60 to 72 hours No anaerobes isolated   02/09/2021 Final Report No anaerobes isolated  Final   02/09/2021 (A)  Final    BETA STREP GROUP G Rare growth Susceptibility testing of penicillin and other beta lactams is not necessary for beta hemolytic Streptococci since resistant strains have not been identified.  (CLSI M100)       Imaging Studies:

## 2021-03-24 NOTE — ASSESSMENT & PLAN NOTE
Left wrist swelling is down, but there is some warmth. Arthritis vs. Persistent infection. Owing to background arthritis, will extend abx by 6 weeks (cumulative: 12 weeks), see in 2 weeks with labs.

## 2021-03-29 ENCOUNTER — HOSPITAL ENCOUNTER (OUTPATIENT)
Age: 76
Setting detail: SPECIMEN
Discharge: HOME | End: 2021-03-29
Payer: MEDICARE

## 2021-03-29 LAB
ALBUMIN SERPL-MCNC: 3 GM/DL (ref 3.4–5)
ALP BLD-CCNC: 83 IU/L (ref 40–128)
ALT SERPL-CCNC: <5 U/L (ref 10–40)
ANION GAP SERPL CALCULATED.3IONS-SCNC: 8 MMOL/L (ref 4–16)
AST SERPL-CCNC: 11 IU/L (ref 15–37)
BASOPHILS ABSOLUTE: 0.1 K/CU MM
BASOPHILS RELATIVE PERCENT: 1.6 % (ref 0–1)
BILIRUB SERPL-MCNC: 0.5 MG/DL (ref 0–1)
BUN BLDV-MCNC: 13 MG/DL (ref 6–23)
C-REACTIVE PROTEIN, HIGH SENSITIVITY: 13.9 MG/L
CALCIUM SERPL-MCNC: 8.5 MG/DL (ref 8.3–10.6)
CHLORIDE BLD-SCNC: 98 MMOL/L (ref 99–110)
CO2: 29 MMOL/L (ref 21–32)
CREAT SERPL-MCNC: 0.5 MG/DL (ref 0.9–1.3)
DIFFERENTIAL TYPE: ABNORMAL
EOSINOPHILS ABSOLUTE: 0.2 K/CU MM
EOSINOPHILS RELATIVE PERCENT: 3.9 % (ref 0–3)
ERYTHROCYTE SEDIMENTATION RATE: 35 MM/HR (ref 0–20)
GFR AFRICAN AMERICAN: >60 ML/MIN/1.73M2
GFR NON-AFRICAN AMERICAN: >60 ML/MIN/1.73M2
GLUCOSE BLD-MCNC: 80 MG/DL (ref 70–99)
HCT VFR BLD CALC: 38.2 % (ref 42–52)
HEMOGLOBIN: 12.4 GM/DL (ref 13.5–18)
IMMATURE NEUTROPHIL %: 0.5 % (ref 0–0.43)
LYMPHOCYTES ABSOLUTE: 1.6 K/CU MM
LYMPHOCYTES RELATIVE PERCENT: 25.7 % (ref 24–44)
MCH RBC QN AUTO: 33 PG (ref 27–31)
MCHC RBC AUTO-ENTMCNC: 32.5 % (ref 32–36)
MCV RBC AUTO: 101.6 FL (ref 78–100)
MONOCYTES ABSOLUTE: 0.6 K/CU MM
MONOCYTES RELATIVE PERCENT: 9.5 % (ref 0–4)
NUCLEATED RBC %: 0 %
PDW BLD-RTO: 12.6 % (ref 11.7–14.9)
PLATELET # BLD: 302 K/CU MM (ref 140–440)
PMV BLD AUTO: 10.6 FL (ref 7.5–11.1)
POTASSIUM SERPL-SCNC: 4.3 MMOL/L (ref 3.5–5.1)
RBC # BLD: 3.76 M/CU MM (ref 4.6–6.2)
SEGMENTED NEUTROPHILS ABSOLUTE COUNT: 3.7 K/CU MM
SEGMENTED NEUTROPHILS RELATIVE PERCENT: 58.8 % (ref 36–66)
SODIUM BLD-SCNC: 135 MMOL/L (ref 135–145)
TOTAL IMMATURE NEUTOROPHIL: 0.03 K/CU MM
TOTAL NUCLEATED RBC: 0 K/CU MM
TOTAL PROTEIN: 5.8 GM/DL (ref 6.4–8.2)
WBC # BLD: 6.2 K/CU MM (ref 4–10.5)

## 2021-03-29 PROCEDURE — 85025 COMPLETE CBC W/AUTO DIFF WBC: CPT

## 2021-03-29 PROCEDURE — 85652 RBC SED RATE AUTOMATED: CPT

## 2021-03-29 PROCEDURE — 80053 COMPREHEN METABOLIC PANEL: CPT

## 2021-03-29 PROCEDURE — 36415 COLL VENOUS BLD VENIPUNCTURE: CPT

## 2021-03-29 PROCEDURE — 86140 C-REACTIVE PROTEIN: CPT

## 2021-04-05 ENCOUNTER — HOSPITAL ENCOUNTER (OUTPATIENT)
Age: 76
Setting detail: SPECIMEN
Discharge: HOME | End: 2021-04-05
Payer: MEDICARE

## 2021-04-05 LAB
ALBUMIN SERPL-MCNC: 3.2 GM/DL (ref 3.4–5)
ALP BLD-CCNC: 106 IU/L (ref 40–128)
ALT SERPL-CCNC: 20 U/L (ref 10–40)
ANION GAP SERPL CALCULATED.3IONS-SCNC: 9 MMOL/L (ref 4–16)
AST SERPL-CCNC: 20 IU/L (ref 15–37)
BASOPHILS ABSOLUTE: 0.1 K/CU MM
BASOPHILS RELATIVE PERCENT: 1 % (ref 0–1)
BILIRUB SERPL-MCNC: 0.6 MG/DL (ref 0–1)
BUN BLDV-MCNC: 10 MG/DL (ref 6–23)
C-REACTIVE PROTEIN, HIGH SENSITIVITY: 6 MG/L
CALCIUM SERPL-MCNC: 9 MG/DL (ref 8.3–10.6)
CHLORIDE BLD-SCNC: 100 MMOL/L (ref 99–110)
CO2: 29 MMOL/L (ref 21–32)
CREAT SERPL-MCNC: 0.6 MG/DL (ref 0.9–1.3)
DIFFERENTIAL TYPE: ABNORMAL
EOSINOPHILS ABSOLUTE: 0.3 K/CU MM
EOSINOPHILS RELATIVE PERCENT: 3.2 % (ref 0–3)
ERYTHROCYTE SEDIMENTATION RATE: 44 MM/HR (ref 0–20)
GFR AFRICAN AMERICAN: >60 ML/MIN/1.73M2
GFR NON-AFRICAN AMERICAN: >60 ML/MIN/1.73M2
GLUCOSE BLD-MCNC: 75 MG/DL (ref 70–99)
HCT VFR BLD CALC: 40.7 % (ref 42–52)
HEMOGLOBIN: 12.9 GM/DL (ref 13.5–18)
IMMATURE NEUTROPHIL %: 0.3 % (ref 0–0.43)
LYMPHOCYTES ABSOLUTE: 1.5 K/CU MM
LYMPHOCYTES RELATIVE PERCENT: 19.7 % (ref 24–44)
MCH RBC QN AUTO: 32.7 PG (ref 27–31)
MCHC RBC AUTO-ENTMCNC: 31.7 % (ref 32–36)
MCV RBC AUTO: 103 FL (ref 78–100)
MONOCYTES ABSOLUTE: 0.6 K/CU MM
MONOCYTES RELATIVE PERCENT: 7.9 % (ref 0–4)
NUCLEATED RBC %: 0 %
PDW BLD-RTO: 12.8 % (ref 11.7–14.9)
PLATELET # BLD: 283 K/CU MM (ref 140–440)
PMV BLD AUTO: 10.9 FL (ref 7.5–11.1)
POTASSIUM SERPL-SCNC: 4.3 MMOL/L (ref 3.5–5.1)
RBC # BLD: 3.95 M/CU MM (ref 4.6–6.2)
SEGMENTED NEUTROPHILS ABSOLUTE COUNT: 5.3 K/CU MM
SEGMENTED NEUTROPHILS RELATIVE PERCENT: 67.9 % (ref 36–66)
SODIUM BLD-SCNC: 138 MMOL/L (ref 135–145)
TOTAL IMMATURE NEUTOROPHIL: 0.02 K/CU MM
TOTAL NUCLEATED RBC: 0 K/CU MM
TOTAL PROTEIN: 6.1 GM/DL (ref 6.4–8.2)
WBC # BLD: 7.7 K/CU MM (ref 4–10.5)

## 2021-04-05 PROCEDURE — 36415 COLL VENOUS BLD VENIPUNCTURE: CPT

## 2021-04-05 PROCEDURE — 80053 COMPREHEN METABOLIC PANEL: CPT

## 2021-04-05 PROCEDURE — 85652 RBC SED RATE AUTOMATED: CPT

## 2021-04-05 PROCEDURE — 86140 C-REACTIVE PROTEIN: CPT

## 2021-04-05 PROCEDURE — 85025 COMPLETE CBC W/AUTO DIFF WBC: CPT

## 2021-08-02 ENCOUNTER — HOSPITAL ENCOUNTER (OUTPATIENT)
Age: 76
Setting detail: SPECIMEN
Discharge: HOME | End: 2021-08-02
Payer: MEDICARE

## 2021-08-02 LAB
ALBUMIN SERPL-MCNC: 3.3 GM/DL (ref 3.4–5)
ALP BLD-CCNC: 90 IU/L (ref 40–128)
ALT SERPL-CCNC: 7 U/L (ref 10–40)
ANION GAP SERPL CALCULATED.3IONS-SCNC: 10 MMOL/L (ref 4–16)
AST SERPL-CCNC: 10 IU/L (ref 15–37)
BASOPHILS ABSOLUTE: 0.1 K/CU MM
BASOPHILS RELATIVE PERCENT: 1.1 % (ref 0–1)
BILIRUB SERPL-MCNC: 0.5 MG/DL (ref 0–1)
BUN BLDV-MCNC: 15 MG/DL (ref 6–23)
CALCIUM SERPL-MCNC: 9.2 MG/DL (ref 8.3–10.6)
CHLORIDE BLD-SCNC: 101 MMOL/L (ref 99–110)
CO2: 28 MMOL/L (ref 21–32)
CREAT SERPL-MCNC: 0.5 MG/DL (ref 0.9–1.3)
DIFFERENTIAL TYPE: ABNORMAL
EOSINOPHILS ABSOLUTE: 0.3 K/CU MM
EOSINOPHILS RELATIVE PERCENT: 4.5 % (ref 0–3)
GFR AFRICAN AMERICAN: >60 ML/MIN/1.73M2
GFR NON-AFRICAN AMERICAN: >60 ML/MIN/1.73M2
GLUCOSE BLD-MCNC: 71 MG/DL (ref 70–99)
HCT VFR BLD CALC: 37.8 % (ref 42–52)
HEMOGLOBIN: 11.7 GM/DL (ref 13.5–18)
IMMATURE NEUTROPHIL %: 0.2 % (ref 0–0.43)
LYMPHOCYTES ABSOLUTE: 1.7 K/CU MM
LYMPHOCYTES RELATIVE PERCENT: 26.8 % (ref 24–44)
MCH RBC QN AUTO: 30.6 PG (ref 27–31)
MCHC RBC AUTO-ENTMCNC: 31 % (ref 32–36)
MCV RBC AUTO: 99 FL (ref 78–100)
MONOCYTES ABSOLUTE: 0.6 K/CU MM
MONOCYTES RELATIVE PERCENT: 9.9 % (ref 0–4)
NUCLEATED RBC %: 0 %
PDW BLD-RTO: 13.4 % (ref 11.7–14.9)
PLATELET # BLD: 239 K/CU MM (ref 140–440)
PMV BLD AUTO: 11 FL (ref 7.5–11.1)
POTASSIUM SERPL-SCNC: 4.2 MMOL/L (ref 3.5–5.1)
RBC # BLD: 3.82 M/CU MM (ref 4.6–6.2)
SEGMENTED NEUTROPHILS ABSOLUTE COUNT: 3.7 K/CU MM
SEGMENTED NEUTROPHILS RELATIVE PERCENT: 57.5 % (ref 36–66)
SODIUM BLD-SCNC: 139 MMOL/L (ref 135–145)
TOTAL IMMATURE NEUTOROPHIL: 0.01 K/CU MM
TOTAL NUCLEATED RBC: 0 K/CU MM
TOTAL PROTEIN: 5.9 GM/DL (ref 6.4–8.2)
WBC # BLD: 6.5 K/CU MM (ref 4–10.5)

## 2021-08-02 PROCEDURE — 80053 COMPREHEN METABOLIC PANEL: CPT

## 2021-08-02 PROCEDURE — 85025 COMPLETE CBC W/AUTO DIFF WBC: CPT

## 2021-08-02 PROCEDURE — 36415 COLL VENOUS BLD VENIPUNCTURE: CPT

## 2021-08-16 ENCOUNTER — HOSPITAL ENCOUNTER (OUTPATIENT)
Age: 76
Setting detail: SPECIMEN
Discharge: HOME | End: 2021-08-16
Payer: MEDICARE

## 2021-08-16 LAB
ALBUMIN SERPL-MCNC: 3.8 GM/DL (ref 3.4–5)
ALP BLD-CCNC: 107 IU/L (ref 40–128)
ALT SERPL-CCNC: 8 U/L (ref 10–40)
ANION GAP SERPL CALCULATED.3IONS-SCNC: 10 MMOL/L (ref 4–16)
AST SERPL-CCNC: 12 IU/L (ref 15–37)
BILIRUB SERPL-MCNC: 0.6 MG/DL (ref 0–1)
BUN BLDV-MCNC: 11 MG/DL (ref 6–23)
CALCIUM SERPL-MCNC: 9.4 MG/DL (ref 8.3–10.6)
CHLORIDE BLD-SCNC: 97 MMOL/L (ref 99–110)
CO2: 28 MMOL/L (ref 21–32)
CREAT SERPL-MCNC: 0.5 MG/DL (ref 0.9–1.3)
GFR AFRICAN AMERICAN: >60 ML/MIN/1.73M2
GFR NON-AFRICAN AMERICAN: >60 ML/MIN/1.73M2
GLUCOSE BLD-MCNC: 66 MG/DL (ref 70–99)
HCT VFR BLD CALC: 42 % (ref 42–52)
HEMOGLOBIN: 13.1 GM/DL (ref 13.5–18)
MCH RBC QN AUTO: 30.8 PG (ref 27–31)
MCHC RBC AUTO-ENTMCNC: 31.2 % (ref 32–36)
MCV RBC AUTO: 98.8 FL (ref 78–100)
PDW BLD-RTO: 13.6 % (ref 11.7–14.9)
PLATELET # BLD: 251 K/CU MM (ref 140–440)
PMV BLD AUTO: 10.6 FL (ref 7.5–11.1)
POTASSIUM SERPL-SCNC: 4.8 MMOL/L (ref 3.5–5.1)
RBC # BLD: 4.25 M/CU MM (ref 4.6–6.2)
SODIUM BLD-SCNC: 135 MMOL/L (ref 135–145)
TOTAL PROTEIN: 6.3 GM/DL (ref 6.4–8.2)
WBC # BLD: 6.6 K/CU MM (ref 4–10.5)

## 2021-08-16 PROCEDURE — 80053 COMPREHEN METABOLIC PANEL: CPT

## 2021-08-16 PROCEDURE — 85027 COMPLETE CBC AUTOMATED: CPT

## 2021-08-16 PROCEDURE — 36415 COLL VENOUS BLD VENIPUNCTURE: CPT

## 2022-03-25 NOTE — PROGRESS NOTES
1354 updated dr Priscilla Pederson and dr Vanita Barth to put note in d/t medical necessity for emergency signed consent. Progress Note  PULMONARY    Admit Date: 3/22/2022   03/25/2022      SUBJECTIVE:     3/24- wife and daughter at bedside. Pt calm now but per their report he had a rough night with agitation, confusion and had to be given medicine to sedate him. He c/o pain R arm where IV potassium is infusing. resp status worsened now req 10L HFNC. The family is meeting w/ palliative at 1  3/25- pt had initial improvement in O2 reqt then worsened again requiring 9L. Overnight dose of solumedrol had to be held due to blood glucose in the 500s. Pt feeling good with no complaints. Wife at bedside      OBJECTIVE:     Vitals (Most recent):  Vitals:    03/25/22 0809   BP:    Pulse: 79   Resp: 18   Temp:        Vitals (24 hour range):  Temp:  [96.9 °F (36.1 °C)-99.2 °F (37.3 °C)]   Pulse:  [56-96]   Resp:  [17-24]   BP: (118-167)/(61-77)   SpO2:  [90 %-98 %]       Intake/Output Summary (Last 24 hours) at 3/25/2022 0930  Last data filed at 3/25/2022 0558  Gross per 24 hour   Intake 1787.36 ml   Output 1080 ml   Net 707.36 ml          Physical Exam:  The patient's neuro status (alertness,orientation,cognitive function,motor skills,), pharyngeal exam (oral lesions, hygiene, abn dentition,), Neck (jvd,mass,thyroid,nodes in neck and above/below clavicle),RESPIRATORY(symmetry,effort,fremitus,percussion,auscultation),  Cor(rhythm,heart tones including gallops,perfusion,edema)ABD(distention,hepatic&splenomegaly,tenderness,masses), Skin(rash,cyanosis),Psyc(affect,judgement,).  Exam negative except for these pertinent findings:    Alert, pleasant, verbalizes, no distress  Hard of hearing  HR regular w/ systolic murmur  Bilateral fine rales  Abdomen soft nontender  Chronic discoloration bilat legs, no edema    Radiographs reviewed: view by direct vision   CXR 3/25- slight improved infiltrates on right, same on left  CXR 3/24- unchanged    TTE 3/23-   · The estimated ejection fraction is 65%.  · Grade I left ventricular diastolic dysfunction.  · Atrial  fibrillation not observed.  · The left ventricle is normal in size with  · Normal right ventricular size with normal right ventricular systolic function.  · There is mild aortic valve stenosis.  · Aortic valve area is 1.98 cm2; peak velocity is 2.48 m/s; mean gradient is 13 mmHg.  · Mild mitral regurgitation.  · Mild tricuspid regurgitation.  · There is mild pulmonary hypertension.  · Normal central venous pressure (3 mmHg).  · The estimated PA systolic pressure is 50 mmHg.      Labs     No results for input(s): WBC, HGB, HCT, PLT, BAND, METAMYELOCYT, MYELOPCT, HGBA1C in the last 24 hours.  Recent Labs   Lab 03/24/22  2144   *   No results for input(s): PH, PCO2, PO2, HCO3 in the last 24 hours.  Microbiology Results (last 7 days)     Procedure Component Value Units Date/Time    Blood Culture #1 [[id number]] Collected: 03/22/22 1454    Order Status: Completed Specimen: Blood from Antecubital, Right Updated: 03/24/22 2312     Blood Culture, Routine No Growth to date      No Growth to date      No Growth to date    Blood Culture #2 [[id number]] Collected: 03/22/22 1454    Order Status: Completed Specimen: Blood from Peripheral, Left Arm Updated: 03/24/22 2312     Blood Culture, Routine No Growth to date      No Growth to date      No Growth to date    Narrative:      drawn by nurys    Influenza A & B by Molecular [[id number]] Collected: 03/22/22 1407    Order Status: Completed Specimen: Nasopharyngeal Swab Updated: 03/22/22 1501     Influenza A, Molecular Negative     Influenza B, Molecular Negative     Flu A & B Source Nasal swab          Impression:  Active Hospital Problems    Diagnosis  POA    *Acute on chronic respiratory failure [J96.20]  Yes    Goals of care, counseling/discussion [Z71.89]  Not Applicable    Dementia without behavioral disturbance [F03.90]  Yes    Community acquired pneumonia, bilateral [J18.9]  Yes    Acute exacerbation of idiopathic pulmonary fibrosis [J84.112]  Yes    Interstitial  lung disease [J84.9]  Yes    Stasis dermatitis of both legs [I87.2]  Yes    Hypertension associated with diabetes [E11.59, I15.2]  Yes    Hepatic cirrhosis due to chronic hepatitis C infection [B18.2, K74.60]  Yes    Chronic low back pain [M54.50, G89.29]  Yes    GERD (gastroesophageal reflux disease) [K21.9]  Yes    Type 2 diabetes mellitus with complication, with long-term current use of insulin [E11.8, Z79.4]  Not Applicable    PVD (peripheral vascular disease) [I73.9]  Yes     Decreased pulses. No claudication      Chronic pain of left knee [M25.562, G89.29]  Yes     Pain contract with Dr. Pereira Feb 2014.         Resolved Hospital Problems   No resolved problems to display.               Plan:         - continue supplemental O2 to keep sats 89-92%  - continue empiric antibiotics to cover for pneumonia  - PH likely due to lung disease and diastolic dysfunction, no advanced PH therapy recommended at this time  - decrease solumedrol to 40mg q6h given adverse effect hyperglycemia  - glucose checks, increase frequency, cover w/ insulin  - d/w hospitalist    Maribell Campo MD  Pulmonary & Critical Care Medicine                                    .

## 2022-08-08 ENCOUNTER — HOSPITAL ENCOUNTER (OUTPATIENT)
Age: 77
Setting detail: SPECIMEN
Discharge: HOME | End: 2022-08-08
Payer: COMMERCIAL

## 2022-08-08 LAB
ALBUMIN SERPL-MCNC: 3.4 GM/DL (ref 3.4–5)
ALP BLD-CCNC: 86 IU/L (ref 40–128)
ALT SERPL-CCNC: 8 U/L (ref 10–40)
ANION GAP SERPL CALCULATED.3IONS-SCNC: 8 MMOL/L (ref 4–16)
AST SERPL-CCNC: 10 IU/L (ref 15–37)
BILIRUB SERPL-MCNC: 0.6 MG/DL (ref 0–1)
BUN BLDV-MCNC: 16 MG/DL (ref 6–23)
CALCIUM SERPL-MCNC: 8.7 MG/DL (ref 8.3–10.6)
CHLORIDE BLD-SCNC: 103 MMOL/L (ref 99–110)
CO2: 27 MMOL/L (ref 21–32)
CREAT SERPL-MCNC: 0.7 MG/DL (ref 0.9–1.3)
GFR AFRICAN AMERICAN: >60 ML/MIN/1.73M2
GFR NON-AFRICAN AMERICAN: >60 ML/MIN/1.73M2
GLUCOSE BLD-MCNC: 80 MG/DL (ref 70–99)
HCT VFR BLD CALC: 41.3 % (ref 42–52)
HEMOGLOBIN: 13.4 GM/DL (ref 13.5–18)
MCH RBC QN AUTO: 31.8 PG (ref 27–31)
MCHC RBC AUTO-ENTMCNC: 32.4 % (ref 32–36)
MCV RBC AUTO: 97.9 FL (ref 78–100)
PDW BLD-RTO: 12.8 % (ref 11.7–14.9)
PLATELET # BLD: 212 K/CU MM (ref 140–440)
PMV BLD AUTO: 10.8 FL (ref 7.5–11.1)
POTASSIUM SERPL-SCNC: 4.4 MMOL/L (ref 3.5–5.1)
RBC # BLD: 4.22 M/CU MM (ref 4.6–6.2)
SODIUM BLD-SCNC: 138 MMOL/L (ref 135–145)
TOTAL PROTEIN: 5.4 GM/DL (ref 6.4–8.2)
WBC # BLD: 6.8 K/CU MM (ref 4–10.5)

## 2022-08-08 PROCEDURE — 85027 COMPLETE CBC AUTOMATED: CPT

## 2022-08-08 PROCEDURE — 80053 COMPREHEN METABOLIC PANEL: CPT

## 2022-08-08 PROCEDURE — 36415 COLL VENOUS BLD VENIPUNCTURE: CPT

## 2023-07-13 ENCOUNTER — HOSPITAL ENCOUNTER (OUTPATIENT)
Age: 78
Setting detail: SPECIMEN
Discharge: HOME | End: 2023-07-13
Payer: COMMERCIAL

## 2023-07-13 LAB
ALBUMIN SERPL-MCNC: 3.3 GM/DL (ref 3.4–5)
ALP BLD-CCNC: 100 IU/L (ref 40–128)
ALT SERPL-CCNC: 9 U/L (ref 10–40)
ANION GAP SERPL CALCULATED.3IONS-SCNC: 11 MMOL/L (ref 4–16)
AST SERPL-CCNC: 15 IU/L (ref 15–37)
BILIRUB SERPL-MCNC: 0.5 MG/DL (ref 0–1)
BUN SERPL-MCNC: 14 MG/DL (ref 6–23)
CALCIUM SERPL-MCNC: 8.7 MG/DL (ref 8.3–10.6)
CHLORIDE BLD-SCNC: 105 MMOL/L (ref 99–110)
CO2: 25 MMOL/L (ref 21–32)
CREAT SERPL-MCNC: 0.7 MG/DL (ref 0.9–1.3)
GFR SERPL CREATININE-BSD FRML MDRD: >60 ML/MIN/1.73M2
GLUCOSE SERPL-MCNC: 82 MG/DL (ref 70–99)
HCT VFR BLD CALC: 38.9 % (ref 42–52)
HEMOGLOBIN: 12 GM/DL (ref 13.5–18)
MCH RBC QN AUTO: 29.3 PG (ref 27–31)
MCHC RBC AUTO-ENTMCNC: 30.8 % (ref 32–36)
MCV RBC AUTO: 95.1 FL (ref 78–100)
PDW BLD-RTO: 13.3 % (ref 11.7–14.9)
PLATELET # BLD: 353 K/CU MM (ref 140–440)
PMV BLD AUTO: 10.7 FL (ref 7.5–11.1)
POTASSIUM SERPL-SCNC: 3.5 MMOL/L (ref 3.5–5.1)
RBC # BLD: 4.09 M/CU MM (ref 4.6–6.2)
SODIUM BLD-SCNC: 141 MMOL/L (ref 135–145)
TOTAL PROTEIN: 6 GM/DL (ref 6.4–8.2)
WBC # BLD: 11.8 K/CU MM (ref 4–10.5)

## 2023-07-13 PROCEDURE — 85027 COMPLETE CBC AUTOMATED: CPT

## 2023-07-13 PROCEDURE — 80053 COMPREHEN METABOLIC PANEL: CPT

## 2023-07-13 PROCEDURE — 36415 COLL VENOUS BLD VENIPUNCTURE: CPT

## 2023-07-21 ENCOUNTER — HOSPITAL ENCOUNTER (OUTPATIENT)
Age: 78
Setting detail: SPECIMEN
Discharge: HOME | End: 2023-07-21
Payer: COMMERCIAL

## 2023-07-21 LAB
HCT VFR BLD CALC: 36.7 % (ref 42–52)
HEMOGLOBIN: 11.6 GM/DL (ref 13.5–18)
MCH RBC QN AUTO: 29.6 PG (ref 27–31)
MCHC RBC AUTO-ENTMCNC: 31.6 % (ref 32–36)
MCV RBC AUTO: 93.6 FL (ref 78–100)
PDW BLD-RTO: 13.2 % (ref 11.7–14.9)
PLATELET # BLD: 317 K/CU MM (ref 140–440)
PMV BLD AUTO: 10.3 FL (ref 7.5–11.1)
RBC # BLD: 3.92 M/CU MM (ref 4.6–6.2)
WBC # BLD: 9.7 K/CU MM (ref 4–10.5)

## 2023-07-21 PROCEDURE — 36415 COLL VENOUS BLD VENIPUNCTURE: CPT

## 2023-07-21 PROCEDURE — 85027 COMPLETE CBC AUTOMATED: CPT

## 2023-10-17 NOTE — CARE COORDINATION
CM had VM from [facility]. Pt accepted and can go when medically ready. Pt will need updated PT note and rapid covid before d/c.      PS sent to Dr. Kenji Cobos to update. Whiteboard note sent to PT. CM following. Plan of care initiated

## 2024-03-21 ENCOUNTER — HOSPITAL ENCOUNTER (OUTPATIENT)
Age: 79
Setting detail: SPECIMEN
Discharge: HOME | End: 2024-03-21
Payer: COMMERCIAL

## 2024-03-21 ENCOUNTER — HOSPITAL ENCOUNTER (INPATIENT)
Age: 79
LOS: 13 days | Discharge: OTHER INSTITUTION | DRG: 640 | End: 2024-04-03
Attending: STUDENT IN AN ORGANIZED HEALTH CARE EDUCATION/TRAINING PROGRAM
Payer: COMMERCIAL

## 2024-03-21 DIAGNOSIS — E87.6 HYPOKALEMIA: Primary | ICD-10-CM

## 2024-03-21 LAB
ALBUMIN SERPL-MCNC: 3.4 GM/DL (ref 3.4–5)
ALBUMIN SERPL-MCNC: 3.6 GM/DL (ref 3.4–5)
ALP BLD-CCNC: 111 IU/L (ref 40–128)
ALP BLD-CCNC: 112 IU/L (ref 40–129)
ALT SERPL-CCNC: 10 U/L (ref 10–40)
ALT SERPL-CCNC: 11 U/L (ref 10–40)
ANION GAP SERPL CALCULATED.3IONS-SCNC: 13 MMOL/L (ref 7–16)
ANION GAP SERPL CALCULATED.3IONS-SCNC: 18 MMOL/L (ref 7–16)
AST SERPL-CCNC: 18 IU/L (ref 15–37)
AST SERPL-CCNC: 21 IU/L (ref 15–37)
BASOPHILS ABSOLUTE: 0.1 K/CU MM
BASOPHILS RELATIVE PERCENT: 0.6 % (ref 0–1)
BILIRUB SERPL-MCNC: 0.9 MG/DL (ref 0–1)
BILIRUB SERPL-MCNC: 1 MG/DL (ref 0–1)
BUN SERPL-MCNC: 23 MG/DL (ref 6–23)
BUN SERPL-MCNC: 25 MG/DL (ref 6–23)
CALCIUM SERPL-MCNC: 8.9 MG/DL (ref 8.3–10.6)
CALCIUM SERPL-MCNC: 8.9 MG/DL (ref 8.3–10.6)
CHLORIDE BLD-SCNC: 88 MMOL/L (ref 99–110)
CHLORIDE BLD-SCNC: 92 MMOL/L (ref 99–110)
CO2: 32 MMOL/L (ref 21–32)
CO2: 33 MMOL/L (ref 21–32)
CREAT SERPL-MCNC: 1 MG/DL (ref 0.9–1.3)
CREAT SERPL-MCNC: 1.2 MG/DL (ref 0.9–1.3)
DIFFERENTIAL TYPE: ABNORMAL
EKG ATRIAL RATE: 78 BPM
EKG DIAGNOSIS: NORMAL
EKG P AXIS: 98 DEGREES
EKG P-R INTERVAL: 202 MS
EKG Q-T INTERVAL: 446 MS
EKG QRS DURATION: 118 MS
EKG QTC CALCULATION (BAZETT): 508 MS
EKG R AXIS: -45 DEGREES
EKG T AXIS: 92 DEGREES
EKG VENTRICULAR RATE: 78 BPM
EOSINOPHILS ABSOLUTE: 0.2 K/CU MM
EOSINOPHILS RELATIVE PERCENT: 1.6 % (ref 0–3)
GFR SERPL CREATININE-BSD FRML MDRD: >60 ML/MIN/1.73M2
GFR SERPL CREATININE-BSD FRML MDRD: >60 ML/MIN/1.73M2
GLUCOSE SERPL-MCNC: 101 MG/DL (ref 70–99)
GLUCOSE SERPL-MCNC: 121 MG/DL (ref 70–99)
HCT VFR BLD CALC: 38.3 % (ref 42–52)
HCT VFR BLD CALC: 41.3 % (ref 42–52)
HEMOGLOBIN: 12.8 GM/DL (ref 13.5–18)
HEMOGLOBIN: 13.9 GM/DL (ref 13.5–18)
IMMATURE NEUTROPHIL %: 0.3 % (ref 0–0.43)
LYMPHOCYTES ABSOLUTE: 1.8 K/CU MM
LYMPHOCYTES RELATIVE PERCENT: 18.8 % (ref 24–44)
MAGNESIUM: 1.8 MG/DL (ref 1.8–2.4)
MAGNESIUM: 1.9 MG/DL (ref 1.8–2.4)
MCH RBC QN AUTO: 30.5 PG (ref 27–31)
MCH RBC QN AUTO: 30.6 PG (ref 27–31)
MCHC RBC AUTO-ENTMCNC: 33.4 % (ref 32–36)
MCHC RBC AUTO-ENTMCNC: 33.7 % (ref 32–36)
MCV RBC AUTO: 90.8 FL (ref 78–100)
MCV RBC AUTO: 91.6 FL (ref 78–100)
MONOCYTES ABSOLUTE: 0.9 K/CU MM
MONOCYTES RELATIVE PERCENT: 9.8 % (ref 0–4)
NUCLEATED RBC %: 0 %
PDW BLD-RTO: 15.9 % (ref 11.7–14.9)
PDW BLD-RTO: 16 % (ref 11.7–14.9)
PLATELET # BLD: 377 K/CU MM (ref 140–440)
PLATELET # BLD: 379 K/CU MM (ref 140–440)
PMV BLD AUTO: 10.3 FL (ref 7.5–11.1)
PMV BLD AUTO: 10.9 FL (ref 7.5–11.1)
POTASSIUM SERPL-SCNC: 1.7 MMOL/L (ref 3.5–5.1)
POTASSIUM SERPL-SCNC: 1.9 MMOL/L (ref 3.5–5.1)
POTASSIUM SERPL-SCNC: 2 MMOL/L (ref 3.5–5.1)
PROCALCITONIN SERPL-MCNC: 0.17 NG/ML
RBC # BLD: 4.18 M/CU MM (ref 4.6–6.2)
RBC # BLD: 4.55 M/CU MM (ref 4.6–6.2)
SEGMENTED NEUTROPHILS ABSOLUTE COUNT: 6.5 K/CU MM
SEGMENTED NEUTROPHILS RELATIVE PERCENT: 68.9 % (ref 36–66)
SODIUM BLD-SCNC: 134 MMOL/L (ref 135–145)
SODIUM BLD-SCNC: 142 MMOL/L (ref 135–145)
TOTAL IMMATURE NEUTOROPHIL: 0.03 K/CU MM
TOTAL NUCLEATED RBC: 0 K/CU MM
TOTAL PROTEIN: 6.3 GM/DL (ref 6.4–8.2)
TOTAL PROTEIN: 7.2 GM/DL (ref 6.4–8.2)
WBC # BLD: 9.5 K/CU MM (ref 4–10.5)
WBC # BLD: 9.6 K/CU MM (ref 4–10.5)

## 2024-03-21 PROCEDURE — 6360000002 HC RX W HCPCS: Performed by: STUDENT IN AN ORGANIZED HEALTH CARE EDUCATION/TRAINING PROGRAM

## 2024-03-21 PROCEDURE — 93005 ELECTROCARDIOGRAM TRACING: CPT | Performed by: PHYSICIAN ASSISTANT

## 2024-03-21 PROCEDURE — 6360000002 HC RX W HCPCS: Performed by: PHYSICIAN ASSISTANT

## 2024-03-21 PROCEDURE — 84145 PROCALCITONIN (PCT): CPT

## 2024-03-21 PROCEDURE — 2060000000 HC ICU INTERMEDIATE R&B

## 2024-03-21 PROCEDURE — 6370000000 HC RX 637 (ALT 250 FOR IP): Performed by: PHYSICIAN ASSISTANT

## 2024-03-21 PROCEDURE — 96365 THER/PROPH/DIAG IV INF INIT: CPT

## 2024-03-21 PROCEDURE — 83735 ASSAY OF MAGNESIUM: CPT

## 2024-03-21 PROCEDURE — 36415 COLL VENOUS BLD VENIPUNCTURE: CPT

## 2024-03-21 PROCEDURE — 93010 ELECTROCARDIOGRAM REPORT: CPT | Performed by: INTERNAL MEDICINE

## 2024-03-21 PROCEDURE — 99285 EMERGENCY DEPT VISIT HI MDM: CPT

## 2024-03-21 PROCEDURE — 84132 ASSAY OF SERUM POTASSIUM: CPT

## 2024-03-21 PROCEDURE — 96366 THER/PROPH/DIAG IV INF ADDON: CPT

## 2024-03-21 PROCEDURE — 6370000000 HC RX 637 (ALT 250 FOR IP): Performed by: STUDENT IN AN ORGANIZED HEALTH CARE EDUCATION/TRAINING PROGRAM

## 2024-03-21 PROCEDURE — 85027 COMPLETE CBC AUTOMATED: CPT

## 2024-03-21 PROCEDURE — 80053 COMPREHEN METABOLIC PANEL: CPT

## 2024-03-21 PROCEDURE — 85025 COMPLETE CBC W/AUTO DIFF WBC: CPT

## 2024-03-21 RX ORDER — SODIUM CHLORIDE 0.9 % (FLUSH) 0.9 %
5-40 SYRINGE (ML) INJECTION EVERY 12 HOURS SCHEDULED
Status: DISCONTINUED | OUTPATIENT
Start: 2024-03-21 | End: 2024-04-03 | Stop reason: HOSPADM

## 2024-03-21 RX ORDER — M-VIT,TX,IRON,MINS/CALC/FOLIC 27MG-0.4MG
1 TABLET ORAL DAILY
Status: DISCONTINUED | OUTPATIENT
Start: 2024-03-22 | End: 2024-04-03 | Stop reason: HOSPADM

## 2024-03-21 RX ORDER — POLYETHYLENE GLYCOL 3350 17 G/17G
17 POWDER, FOR SOLUTION ORAL DAILY PRN
Status: DISCONTINUED | OUTPATIENT
Start: 2024-03-21 | End: 2024-04-03 | Stop reason: HOSPADM

## 2024-03-21 RX ORDER — TRAMADOL HYDROCHLORIDE 50 MG/1
50 TABLET ORAL EVERY 6 HOURS PRN
Status: DISCONTINUED | OUTPATIENT
Start: 2024-03-21 | End: 2024-04-03 | Stop reason: HOSPADM

## 2024-03-21 RX ORDER — MAGNESIUM SULFATE IN WATER 40 MG/ML
2000 INJECTION, SOLUTION INTRAVENOUS PRN
Status: DISCONTINUED | OUTPATIENT
Start: 2024-03-21 | End: 2024-04-03 | Stop reason: HOSPADM

## 2024-03-21 RX ORDER — ENOXAPARIN SODIUM 100 MG/ML
40 INJECTION SUBCUTANEOUS DAILY
Status: DISCONTINUED | OUTPATIENT
Start: 2024-03-22 | End: 2024-04-03 | Stop reason: HOSPADM

## 2024-03-21 RX ORDER — POTASSIUM CHLORIDE 7.45 MG/ML
10 INJECTION INTRAVENOUS PRN
Status: DISCONTINUED | OUTPATIENT
Start: 2024-03-21 | End: 2024-04-03 | Stop reason: HOSPADM

## 2024-03-21 RX ORDER — POTASSIUM CHLORIDE 7.45 MG/ML
10 INJECTION INTRAVENOUS
Status: DISPENSED | OUTPATIENT
Start: 2024-03-21 | End: 2024-03-22

## 2024-03-21 RX ORDER — ONDANSETRON 4 MG/1
4 TABLET, ORALLY DISINTEGRATING ORAL EVERY 8 HOURS PRN
Status: DISCONTINUED | OUTPATIENT
Start: 2024-03-21 | End: 2024-04-03 | Stop reason: HOSPADM

## 2024-03-21 RX ORDER — POTASSIUM CHLORIDE 20 MEQ/1
20 TABLET, EXTENDED RELEASE ORAL ONCE
Status: COMPLETED | OUTPATIENT
Start: 2024-03-21 | End: 2024-03-21

## 2024-03-21 RX ORDER — POTASSIUM CHLORIDE 7.45 MG/ML
10 INJECTION INTRAVENOUS ONCE
Status: COMPLETED | OUTPATIENT
Start: 2024-03-21 | End: 2024-03-21

## 2024-03-21 RX ORDER — ACETAMINOPHEN 325 MG/1
650 TABLET ORAL EVERY 6 HOURS PRN
Status: DISCONTINUED | OUTPATIENT
Start: 2024-03-21 | End: 2024-04-03 | Stop reason: HOSPADM

## 2024-03-21 RX ORDER — LANOLIN ALCOHOL/MO/W.PET/CERES
100 CREAM (GRAM) TOPICAL DAILY
Status: DISCONTINUED | OUTPATIENT
Start: 2024-03-22 | End: 2024-04-03 | Stop reason: HOSPADM

## 2024-03-21 RX ORDER — FOLIC ACID 1 MG/1
1 TABLET ORAL DAILY
Status: DISCONTINUED | OUTPATIENT
Start: 2024-03-22 | End: 2024-04-03 | Stop reason: HOSPADM

## 2024-03-21 RX ORDER — SODIUM CHLORIDE 9 MG/ML
INJECTION, SOLUTION INTRAVENOUS PRN
Status: DISCONTINUED | OUTPATIENT
Start: 2024-03-21 | End: 2024-04-03 | Stop reason: HOSPADM

## 2024-03-21 RX ORDER — ONDANSETRON 2 MG/ML
4 INJECTION INTRAMUSCULAR; INTRAVENOUS EVERY 6 HOURS PRN
Status: DISCONTINUED | OUTPATIENT
Start: 2024-03-21 | End: 2024-04-03 | Stop reason: HOSPADM

## 2024-03-21 RX ORDER — ACETAMINOPHEN 650 MG/1
650 SUPPOSITORY RECTAL EVERY 6 HOURS PRN
Status: DISCONTINUED | OUTPATIENT
Start: 2024-03-21 | End: 2024-04-03 | Stop reason: HOSPADM

## 2024-03-21 RX ORDER — POTASSIUM CHLORIDE 20 MEQ/1
40 TABLET, EXTENDED RELEASE ORAL PRN
Status: DISCONTINUED | OUTPATIENT
Start: 2024-03-21 | End: 2024-04-03 | Stop reason: HOSPADM

## 2024-03-21 RX ORDER — SODIUM CHLORIDE 0.9 % (FLUSH) 0.9 %
5-40 SYRINGE (ML) INJECTION PRN
Status: DISCONTINUED | OUTPATIENT
Start: 2024-03-21 | End: 2024-04-03 | Stop reason: HOSPADM

## 2024-03-21 RX ORDER — POTASSIUM CHLORIDE 20 MEQ/1
40 TABLET, EXTENDED RELEASE ORAL ONCE
Status: DISCONTINUED | OUTPATIENT
Start: 2024-03-21 | End: 2024-03-22

## 2024-03-21 RX ADMIN — POTASSIUM CHLORIDE 20 MEQ: 1500 TABLET, EXTENDED RELEASE ORAL at 17:44

## 2024-03-21 RX ADMIN — POTASSIUM CHLORIDE 10 MEQ: 7.46 INJECTION, SOLUTION INTRAVENOUS at 22:55

## 2024-03-21 RX ADMIN — POTASSIUM CHLORIDE 10 MEQ: 7.46 INJECTION, SOLUTION INTRAVENOUS at 20:18

## 2024-03-21 RX ADMIN — POTASSIUM CHLORIDE 10 MEQ: 7.46 INJECTION, SOLUTION INTRAVENOUS at 21:41

## 2024-03-21 RX ADMIN — POTASSIUM CHLORIDE 10 MEQ: 7.46 INJECTION, SOLUTION INTRAVENOUS at 17:51

## 2024-03-21 RX ADMIN — POTASSIUM CHLORIDE 10 MEQ: 7.46 INJECTION, SOLUTION INTRAVENOUS at 23:56

## 2024-03-21 RX ADMIN — POTASSIUM CHLORIDE 20 MEQ: 1500 TABLET, EXTENDED RELEASE ORAL at 23:06

## 2024-03-21 NOTE — ED NOTES
details: potassium  Blood Product Administration: no  If Yes, please provide details: NA    Recommendation    Incomplete orders NA  Additional Comments: NA   If any further questions, please call Sending RN at [phone]    Electronically signed by: Electronically signed by Yuliana Wilder RN on 3/21/2024 at 7:11 PM

## 2024-03-21 NOTE — H&P
the 24 hours ending 03/21/24 1905   Vitals:   Vitals:    03/21/24 1630 03/21/24 1700 03/21/24 1730 03/21/24 1800   BP: 117/74 104/71 110/62 (!) 100/57   Pulse: 82 83 63 61   Resp: 18 19 16 18   Temp: 98.6 °F (37 °C) 98.7 °F (37.1 °C)  98.6 °F (37 °C)   TempSrc: Oral Oral  Oral   SpO2: 97% 96% (!) 78% 96%       Medications Prior to Admission     Prior to Admission medications    Medication Sig Start Date End Date Taking? Authorizing Provider   sodium chloride, PF, 0.9 % injection Infuse 10 mLs intravenously 2 times daily 2/12/21   Kenyon Reyna MD   folic acid (FOLVITE) 1 MG tablet Take 1 tablet by mouth daily 2/13/21   Kenyon Reyna MD   Multiple Vitamins-Minerals (THERAPEUTIC MULTIVITAMIN-MINERALS) tablet Take 1 tablet by mouth daily 2/13/21   Kenyon Reyna MD   thiamine 100 MG tablet Take 1 tablet by mouth daily 2/13/21   Kenyon Reyna MD   sodium chloride, PF, 0.9 % injection Infuse 10 mLs intravenously 2 times daily 2/12/21   Kenyon Reyna MD       Physical Exam:    General: NAD  Eyes: EOMI  ENT: neck supple  Cardiovascular: Regular rate.  Respiratory: Clear to auscultation, some rhonchi, no wheezing b/l  Gastrointestinal: Soft, non tender  Genitourinary: no suprapubic tenderness  Musculoskeletal: +2 pitting edema in the LLE, Kerlex bandage on RLE for chronic wound. Deformities in the hands b/l w/ ulnar deviation   Skin: warm, dry  Neuro: Alert.  Psych: Mood appropriate.       Past Medical History:   PMHx   Past Medical History:   Diagnosis Date    Alcohol abuse     Fall in elderly patient     Smoking history     Tobacco abuse      PSHX:  has a past surgical history that includes Foot Debridement (Left, 2/5/2021) and Wrist fracture surgery (Left, 2/9/2021).  Allergies: No Known Allergies  Fam HX:  family history is not on file.  Soc HX:   Social History     Socioeconomic History    Marital status: Single     Spouse name: None    Number of children: None    Years of education: None

## 2024-03-21 NOTE — ED PROVIDER NOTES
[facility] ICU STEPDOWN  EMERGENCY DEPARTMENT ENCOUNTER        Pt Name: John Paul Burgos  MRN: [id number]  Birthdate 1945  Date of evaluation: 3/21/2024  Provider: Fredo Mesa PA-C  PCP: No primary care provider on file.      SCOT. I have evaluated this patient.        CHIEF COMPLAINT      Chief Complaint   Patient presents with    Abnormal Lab     Potassium 1.9       HISTORY OF PRESENT ILLNESS:     History from : Patient    Limitations to history : None    John Paul Burgos is a 78 y.o. male who presents complaint of reported abnormal blood work.  Patient comes from [facility] nursing facility outpatient blood work that was 1.9.  Patient otherwise denies any other concerns.  Discussed with him, I asked him about the bandage on his left leg, he mentions that he got this dressing placed earlier today, seems wound care, but denies any concerns with the today.  Denies any leg swelling, fever, chest pain, palpitations, shortness of breath, confusion    Nursing Notes were all reviewed and agreed with or any disagreements were addressed in the HPI.    REVIEW OF SYSTEMS :     Review of Systems   All other systems reviewed and are negative.      Pertinent positives and negatives are stated within HPI    PAST HISTORY   has a past medical history of Alcohol abuse, Fall in elderly patient, Smoking history, and Tobacco abuse.    Past Surgical History:   Procedure Laterality Date    FOOT DEBRIDEMENT Left 2/5/2021    FOOT DEBRIDEMENT INCISION AND DRAINAGE performed by Lionel Leggett II, MD at [facility] OR    WRIST FRACTURE SURGERY Left 2/9/2021    LEFT WRIST IRRIGATION AND DEBRIDEMENT, EXAM UNDER FLUOROSCOPY performed by Miguel A Palma MD at [facility] OR       History reviewed. No pertinent family history.    Social History     Tobacco Use    Smoking status: Former     Types: Cigarettes    Smokeless tobacco: Never   Substance Use Topics    Alcohol use: Not Currently       Patient has no known allergies.    The patient’s home

## 2024-03-22 LAB
ALBUMIN SERPL-MCNC: 3.2 GM/DL (ref 3.4–5)
ALP BLD-CCNC: 98 IU/L (ref 40–128)
ALT SERPL-CCNC: 11 U/L (ref 10–40)
ANION GAP SERPL CALCULATED.3IONS-SCNC: 11 MMOL/L (ref 7–16)
AST SERPL-CCNC: 17 IU/L (ref 15–37)
BASOPHILS ABSOLUTE: 0.1 K/CU MM
BASOPHILS RELATIVE PERCENT: 0.7 % (ref 0–1)
BILIRUB SERPL-MCNC: 1 MG/DL (ref 0–1)
BUN SERPL-MCNC: 21 MG/DL (ref 6–23)
C DIFF AG + TOXIN: NORMAL
CALCIUM SERPL-MCNC: 8.5 MG/DL (ref 8.3–10.6)
CHLORIDE BLD-SCNC: 95 MMOL/L (ref 99–110)
CO2: 32 MMOL/L (ref 21–32)
CREAT SERPL-MCNC: 1.1 MG/DL (ref 0.9–1.3)
DIFFERENTIAL TYPE: ABNORMAL
EOSINOPHILS ABSOLUTE: 0.2 K/CU MM
EOSINOPHILS RELATIVE PERCENT: 2.7 % (ref 0–3)
GFR SERPL CREATININE-BSD FRML MDRD: >60 ML/MIN/1.73M2
GLUCOSE SERPL-MCNC: 110 MG/DL (ref 70–99)
HCT VFR BLD CALC: 36.7 % (ref 42–52)
HEMOGLOBIN: 12.4 GM/DL (ref 13.5–18)
IMMATURE NEUTROPHIL %: 0.5 % (ref 0–0.43)
LYMPHOCYTES ABSOLUTE: 1.5 K/CU MM
LYMPHOCYTES RELATIVE PERCENT: 16.7 % (ref 24–44)
MAGNESIUM: 1.8 MG/DL (ref 1.8–2.4)
MCH RBC QN AUTO: 31.2 PG (ref 27–31)
MCHC RBC AUTO-ENTMCNC: 33.8 % (ref 32–36)
MCV RBC AUTO: 92.2 FL (ref 78–100)
MONOCYTES ABSOLUTE: 0.9 K/CU MM
MONOCYTES RELATIVE PERCENT: 10.5 % (ref 0–4)
NUCLEATED RBC %: 0 %
PDW BLD-RTO: 16 % (ref 11.7–14.9)
PHOSPHORUS: 1.6 MG/DL (ref 2.5–4.9)
PLATELET # BLD: 333 K/CU MM (ref 140–440)
PMV BLD AUTO: 10.5 FL (ref 7.5–11.1)
POTASSIUM SERPL-SCNC: 2.2 MMOL/L (ref 3.5–5.1)
POTASSIUM SERPL-SCNC: 2.3 MMOL/L (ref 3.5–5.1)
POTASSIUM SERPL-SCNC: 2.3 MMOL/L (ref 3.5–5.1)
POTASSIUM SERPL-SCNC: 2.4 MMOL/L (ref 3.5–5.1)
RBC # BLD: 3.98 M/CU MM (ref 4.6–6.2)
SEGMENTED NEUTROPHILS ABSOLUTE COUNT: 6 K/CU MM
SEGMENTED NEUTROPHILS RELATIVE PERCENT: 68.9 % (ref 36–66)
SODIUM BLD-SCNC: 138 MMOL/L (ref 135–145)
SOURCE: NORMAL
TOTAL IMMATURE NEUTOROPHIL: 0.04 K/CU MM
TOTAL NUCLEATED RBC: 0 K/CU MM
TOTAL PROTEIN: 5.9 GM/DL (ref 6.4–8.2)
WBC # BLD: 8.7 K/CU MM (ref 4–10.5)

## 2024-03-22 PROCEDURE — 80053 COMPREHEN METABOLIC PANEL: CPT

## 2024-03-22 PROCEDURE — 84100 ASSAY OF PHOSPHORUS: CPT

## 2024-03-22 PROCEDURE — 2580000003 HC RX 258: Performed by: STUDENT IN AN ORGANIZED HEALTH CARE EDUCATION/TRAINING PROGRAM

## 2024-03-22 PROCEDURE — 36415 COLL VENOUS BLD VENIPUNCTURE: CPT

## 2024-03-22 PROCEDURE — 6360000002 HC RX W HCPCS: Performed by: STUDENT IN AN ORGANIZED HEALTH CARE EDUCATION/TRAINING PROGRAM

## 2024-03-22 PROCEDURE — 94761 N-INVAS EAR/PLS OXIMETRY MLT: CPT

## 2024-03-22 PROCEDURE — 84132 ASSAY OF SERUM POTASSIUM: CPT

## 2024-03-22 PROCEDURE — 6370000000 HC RX 637 (ALT 250 FOR IP): Performed by: STUDENT IN AN ORGANIZED HEALTH CARE EDUCATION/TRAINING PROGRAM

## 2024-03-22 PROCEDURE — 97161 PT EVAL LOW COMPLEX 20 MIN: CPT

## 2024-03-22 PROCEDURE — 87324 CLOSTRIDIUM AG IA: CPT

## 2024-03-22 PROCEDURE — 87449 NOS EACH ORGANISM AG IA: CPT

## 2024-03-22 PROCEDURE — 97166 OT EVAL MOD COMPLEX 45 MIN: CPT

## 2024-03-22 PROCEDURE — 2500000003 HC RX 250 WO HCPCS: Performed by: STUDENT IN AN ORGANIZED HEALTH CARE EDUCATION/TRAINING PROGRAM

## 2024-03-22 PROCEDURE — 85025 COMPLETE CBC W/AUTO DIFF WBC: CPT

## 2024-03-22 PROCEDURE — 97530 THERAPEUTIC ACTIVITIES: CPT

## 2024-03-22 PROCEDURE — 83735 ASSAY OF MAGNESIUM: CPT

## 2024-03-22 PROCEDURE — 2060000000 HC ICU INTERMEDIATE R&B

## 2024-03-22 RX ORDER — TRAMADOL HYDROCHLORIDE 50 MG/1
50 TABLET ORAL EVERY 6 HOURS PRN
Status: ON HOLD | COMMUNITY
End: 2024-04-03 | Stop reason: HOSPADM

## 2024-03-22 RX ORDER — POTASSIUM CHLORIDE 7.45 MG/ML
10 INJECTION INTRAVENOUS
Status: DISCONTINUED | OUTPATIENT
Start: 2024-03-23 | End: 2024-03-23

## 2024-03-22 RX ORDER — POTASSIUM CHLORIDE 20 MEQ/1
40 TABLET, EXTENDED RELEASE ORAL 2 TIMES DAILY WITH MEALS
Status: DISCONTINUED | OUTPATIENT
Start: 2024-03-22 | End: 2024-03-22

## 2024-03-22 RX ORDER — MAGNESIUM SULFATE 1 G/100ML
1000 INJECTION INTRAVENOUS ONCE
Status: DISCONTINUED | OUTPATIENT
Start: 2024-03-22 | End: 2024-03-25

## 2024-03-22 RX ORDER — POTASSIUM CHLORIDE 7.45 MG/ML
10 INJECTION INTRAVENOUS
Status: DISPENSED | OUTPATIENT
Start: 2024-03-22 | End: 2024-03-22

## 2024-03-22 RX ORDER — POTASSIUM CHLORIDE 7.45 MG/ML
10 INJECTION INTRAVENOUS
Status: DISPENSED | OUTPATIENT
Start: 2024-03-23 | End: 2024-03-23

## 2024-03-22 RX ADMIN — POTASSIUM CHLORIDE 10 MEQ: 7.46 INJECTION, SOLUTION INTRAVENOUS at 17:27

## 2024-03-22 RX ADMIN — POTASSIUM CHLORIDE 40 MEQ: 1500 TABLET, EXTENDED RELEASE ORAL at 15:31

## 2024-03-22 RX ADMIN — SODIUM PHOSPHATE, MONOBASIC, MONOHYDRATE AND SODIUM PHOSPHATE, DIBASIC, ANHYDROUS 30 MMOL: 142; 276 INJECTION, SOLUTION INTRAVENOUS at 11:42

## 2024-03-22 RX ADMIN — FOLIC ACID 1 MG: 1 TABLET ORAL at 08:30

## 2024-03-22 RX ADMIN — POTASSIUM CHLORIDE 10 MEQ: 7.46 INJECTION, SOLUTION INTRAVENOUS at 16:27

## 2024-03-22 RX ADMIN — POTASSIUM CHLORIDE 10 MEQ: 7.46 INJECTION, SOLUTION INTRAVENOUS at 19:39

## 2024-03-22 RX ADMIN — POTASSIUM CHLORIDE 10 MEQ: 7.46 INJECTION, SOLUTION INTRAVENOUS at 15:26

## 2024-03-22 RX ADMIN — POTASSIUM CHLORIDE 10 MEQ: 7.46 INJECTION, SOLUTION INTRAVENOUS at 18:36

## 2024-03-22 RX ADMIN — POTASSIUM CHLORIDE 10 MEQ: 7.46 INJECTION, SOLUTION INTRAVENOUS at 04:36

## 2024-03-22 RX ADMIN — POTASSIUM CHLORIDE 10 MEQ: 7.46 INJECTION, SOLUTION INTRAVENOUS at 00:55

## 2024-03-22 RX ADMIN — SODIUM CHLORIDE, PRESERVATIVE FREE 10 ML: 5 INJECTION INTRAVENOUS at 08:31

## 2024-03-22 RX ADMIN — MAGNESIUM SULFATE HEPTAHYDRATE 2000 MG: 40 INJECTION, SOLUTION INTRAVENOUS at 13:45

## 2024-03-22 RX ADMIN — POTASSIUM CHLORIDE 10 MEQ: 7.46 INJECTION, SOLUTION INTRAVENOUS at 05:50

## 2024-03-22 RX ADMIN — MAGNESIUM SULFATE HEPTAHYDRATE 2000 MG: 40 INJECTION, SOLUTION INTRAVENOUS at 10:42

## 2024-03-22 RX ADMIN — POTASSIUM CHLORIDE 10 MEQ: 7.46 INJECTION, SOLUTION INTRAVENOUS at 03:22

## 2024-03-22 RX ADMIN — POTASSIUM CHLORIDE 10 MEQ: 7.46 INJECTION, SOLUTION INTRAVENOUS at 06:58

## 2024-03-22 RX ADMIN — Medication 1 TABLET: at 08:30

## 2024-03-22 RX ADMIN — POTASSIUM CHLORIDE 10 MEQ: 7.46 INJECTION, SOLUTION INTRAVENOUS at 14:12

## 2024-03-22 RX ADMIN — Medication 100 MG: at 08:30

## 2024-03-22 RX ADMIN — POTASSIUM CHLORIDE 10 MEQ: 7.46 INJECTION, SOLUTION INTRAVENOUS at 08:36

## 2024-03-22 RX ADMIN — POTASSIUM CHLORIDE 10 MEQ: 7.46 INJECTION, SOLUTION INTRAVENOUS at 02:01

## 2024-03-22 ASSESSMENT — LIFESTYLE VARIABLES
HOW MANY STANDARD DRINKS CONTAINING ALCOHOL DO YOU HAVE ON A TYPICAL DAY: PATIENT DOES NOT DRINK
HOW OFTEN DO YOU HAVE A DRINK CONTAINING ALCOHOL: NEVER

## 2024-03-22 NOTE — CARE COORDINATION
Followed up with pt and dgt in room. Pt confirmed he is from [facility] LT and would like to return. Pt is agreeable to SNF at [facility] as well.      Discharging RN: If pt is discharge over the weekend please complete the following...    1) Alert weekend CM. The pt is going to be returning to LTC, facility admissions contact needs to be alerted of PT/OT evals rec'd SNF and they can start precert while pt is there.  2) Call report [phone]  3) Fax AVS, JANA, and any written scripts to [phone]  4) Set up transport with [facility] [phone]  5) Inform pt and family        The pt does not require a PAS or precert to return to LTC.

## 2024-03-22 NOTE — CARE COORDINATION
Chart reviewed. Spoke with Jose Maria. Confirms pt is from [facility]. They are able to accept pt to return as SNF, but do not need to wait for precert to be approved prior to accepting back. Attempted to speak with pt in room. Unavailable with patient care. Will attempt follow up again.     Discharge back to [facility] LTC/SNF.      03/22/24 8890   Service Assessment   Patient Orientation Alert and Oriented   Cognition Alert   History Provided By Medical Record;Other (see comment)  (Jose Maria)   Primary Caregiver Other (Comment)  (LTC staff)   Support Systems Children  (LTC Staff)   PCP Verified by CM Yes   Prior Functional Level Assistance with the following:   Current Functional Level Assistance with the following:   Can patient return to prior living arrangement Yes   Ability to make needs known: Good   Family able to assist with home care needs: Yes   Would you like for me to discuss the discharge plan with any other family members/significant others, and if so, who? No   Financial Resources Medicaid;Medicare   Community Resources None   Condition of Participation: Discharge Planning   The Plan for Transition of Care is related to the following treatment goals: Return to LTC/SNF   The Patient and/or Patient Representative was provided with a Choice of Provider? Patient   The Patient and/Or Patient Representative agree with the Discharge Plan? Yes   Freedom of Choice list was provided with basic dialogue that supports the patient's individualized plan of care/goals, treatment preferences, and shares the quality data associated with the providers?  Yes

## 2024-03-23 LAB
ANION GAP SERPL CALCULATED.3IONS-SCNC: 11 MMOL/L (ref 7–16)
ANION GAP SERPL CALCULATED.3IONS-SCNC: 14 MMOL/L (ref 7–16)
ANION GAP SERPL CALCULATED.3IONS-SCNC: 8 MMOL/L (ref 7–16)
BASE EXCESS MIXED: 9.5 (ref 0–3)
BASOPHILS ABSOLUTE: 0.1 K/CU MM
BASOPHILS RELATIVE PERCENT: 1 % (ref 0–1)
BUN SERPL-MCNC: 10 MG/DL (ref 6–23)
CALCIUM SERPL-MCNC: 8.1 MG/DL (ref 8.3–10.6)
CALCIUM SERPL-MCNC: 8.5 MG/DL (ref 8.3–10.6)
CALCIUM SERPL-MCNC: 8.5 MG/DL (ref 8.3–10.6)
CHLORIDE BLD-SCNC: 100 MMOL/L (ref 99–110)
CHLORIDE BLD-SCNC: 98 MMOL/L (ref 99–110)
CHLORIDE BLD-SCNC: 98 MMOL/L (ref 99–110)
CHLORIDE URINE RANDOM: 93 MMOL/L (ref 43–210)
CO2: 29 MMOL/L (ref 21–32)
CO2: 31 MMOL/L (ref 21–32)
CO2: 32 MMOL/L (ref 21–32)
COMMENT: ABNORMAL
CREAT SERPL-MCNC: 0.7 MG/DL (ref 0.9–1.3)
CREAT SERPL-MCNC: 0.7 MG/DL (ref 0.9–1.3)
CREAT SERPL-MCNC: 0.8 MG/DL (ref 0.9–1.3)
CREAT UR-MCNC: 26.8 MG/DL (ref 39–259)
CREAT UR-MCNC: 27.7 MG/DL (ref 39–259)
DIFFERENTIAL TYPE: ABNORMAL
EKG ATRIAL RATE: 65 BPM
EKG DIAGNOSIS: NORMAL
EKG Q-T INTERVAL: 448 MS
EKG QRS DURATION: 86 MS
EKG QTC CALCULATION (BAZETT): 465 MS
EKG R AXIS: -31 DEGREES
EKG T AXIS: 1 DEGREES
EKG VENTRICULAR RATE: 65 BPM
EOSINOPHILS ABSOLUTE: 0.2 K/CU MM
EOSINOPHILS RELATIVE PERCENT: 2.2 % (ref 0–3)
GFR SERPL CREATININE-BSD FRML MDRD: >60 ML/MIN/1.73M2
GLUCOSE SERPL-MCNC: 106 MG/DL (ref 70–99)
GLUCOSE SERPL-MCNC: 129 MG/DL (ref 70–99)
GLUCOSE SERPL-MCNC: 138 MG/DL (ref 70–99)
HCO3 VENOUS: 33.5 MMOL/L (ref 22–29)
HCT VFR BLD CALC: 35.7 % (ref 42–52)
HEMOGLOBIN: 12.4 GM/DL (ref 13.5–18)
IMMATURE NEUTROPHIL %: 0.5 % (ref 0–0.43)
LYMPHOCYTES ABSOLUTE: 1.3 K/CU MM
LYMPHOCYTES RELATIVE PERCENT: 15.1 % (ref 24–44)
MAGNESIUM: 2.1 MG/DL (ref 1.8–2.4)
MCH RBC QN AUTO: 31.4 PG (ref 27–31)
MCHC RBC AUTO-ENTMCNC: 34.7 % (ref 32–36)
MCV RBC AUTO: 90.4 FL (ref 78–100)
MONOCYTES ABSOLUTE: 0.9 K/CU MM
MONOCYTES RELATIVE PERCENT: 10.2 % (ref 0–4)
NUCLEATED RBC %: 0 %
O2 SAT, VEN: 91.5 % (ref 50–70)
OSMOLALITY UR: 248 MOS/L (ref 292–1090)
OSMOLALITY UR: 291 MOS/L (ref 280–300)
PCO2, VEN: 42 MMHG (ref 41–51)
PDW BLD-RTO: 15.8 % (ref 11.7–14.9)
PH VENOUS: 7.51 (ref 7.32–7.43)
PHOSPHORUS: 2.2 MG/DL (ref 2.5–4.9)
PLATELET # BLD: 331 K/CU MM (ref 140–440)
PMV BLD AUTO: 10.4 FL (ref 7.5–11.1)
PO2, VEN: 74 MMHG (ref 28–48)
POTASSIUM SERPL-SCNC: 2.7 MMOL/L (ref 3.5–5.1)
POTASSIUM SERPL-SCNC: 2.9 MMOL/L (ref 3.5–5.1)
POTASSIUM SERPL-SCNC: 3.3 MMOL/L (ref 3.5–5.1)
POTASSIUM, UR: 6.2 MMOL/L (ref 22–119)
POTASSIUM, UR: 9.4 MMOL/L (ref 22–119)
PREALBUMIN: 8 MG/DL (ref 20–40)
RBC # BLD: 3.95 M/CU MM (ref 4.6–6.2)
SEGMENTED NEUTROPHILS ABSOLUTE COUNT: 6.3 K/CU MM
SEGMENTED NEUTROPHILS RELATIVE PERCENT: 71 % (ref 36–66)
SODIUM BLD-SCNC: 138 MMOL/L (ref 135–145)
SODIUM BLD-SCNC: 140 MMOL/L (ref 135–145)
SODIUM BLD-SCNC: 143 MMOL/L (ref 135–145)
SODIUM URINE: 74 MMOL/L (ref 35–167)
TOTAL IMMATURE NEUTOROPHIL: 0.04 K/CU MM
TOTAL NUCLEATED RBC: 0 K/CU MM
WBC # BLD: 8.8 K/CU MM (ref 4–10.5)

## 2024-03-23 PROCEDURE — 6370000000 HC RX 637 (ALT 250 FOR IP): Performed by: STUDENT IN AN ORGANIZED HEALTH CARE EDUCATION/TRAINING PROGRAM

## 2024-03-23 PROCEDURE — 93005 ELECTROCARDIOGRAM TRACING: CPT | Performed by: STUDENT IN AN ORGANIZED HEALTH CARE EDUCATION/TRAINING PROGRAM

## 2024-03-23 PROCEDURE — 85025 COMPLETE CBC W/AUTO DIFF WBC: CPT

## 2024-03-23 PROCEDURE — 80048 BASIC METABOLIC PNL TOTAL CA: CPT

## 2024-03-23 PROCEDURE — 6360000002 HC RX W HCPCS: Performed by: STUDENT IN AN ORGANIZED HEALTH CARE EDUCATION/TRAINING PROGRAM

## 2024-03-23 PROCEDURE — 82570 ASSAY OF URINE CREATININE: CPT

## 2024-03-23 PROCEDURE — 6370000000 HC RX 637 (ALT 250 FOR IP): Performed by: INTERNAL MEDICINE

## 2024-03-23 PROCEDURE — 83935 ASSAY OF URINE OSMOLALITY: CPT

## 2024-03-23 PROCEDURE — 84300 ASSAY OF URINE SODIUM: CPT

## 2024-03-23 PROCEDURE — 84134 ASSAY OF PREALBUMIN: CPT

## 2024-03-23 PROCEDURE — 82436 ASSAY OF URINE CHLORIDE: CPT

## 2024-03-23 PROCEDURE — 94761 N-INVAS EAR/PLS OXIMETRY MLT: CPT

## 2024-03-23 PROCEDURE — 6370000000 HC RX 637 (ALT 250 FOR IP): Performed by: FAMILY MEDICINE

## 2024-03-23 PROCEDURE — 83930 ASSAY OF BLOOD OSMOLALITY: CPT

## 2024-03-23 PROCEDURE — 84100 ASSAY OF PHOSPHORUS: CPT

## 2024-03-23 PROCEDURE — 84244 ASSAY OF RENIN: CPT

## 2024-03-23 PROCEDURE — 2060000000 HC ICU INTERMEDIATE R&B

## 2024-03-23 PROCEDURE — 83735 ASSAY OF MAGNESIUM: CPT

## 2024-03-23 PROCEDURE — 82805 BLOOD GASES W/O2 SATURATION: CPT

## 2024-03-23 PROCEDURE — 36415 COLL VENOUS BLD VENIPUNCTURE: CPT

## 2024-03-23 PROCEDURE — 93010 ELECTROCARDIOGRAM REPORT: CPT | Performed by: INTERNAL MEDICINE

## 2024-03-23 PROCEDURE — 2580000003 HC RX 258: Performed by: STUDENT IN AN ORGANIZED HEALTH CARE EDUCATION/TRAINING PROGRAM

## 2024-03-23 PROCEDURE — 82088 ASSAY OF ALDOSTERONE: CPT

## 2024-03-23 PROCEDURE — 84133 ASSAY OF URINE POTASSIUM: CPT

## 2024-03-23 RX ORDER — DIPHENOXYLATE HYDROCHLORIDE AND ATROPINE SULFATE 2.5; .025 MG/1; MG/1
1 TABLET ORAL 4 TIMES DAILY PRN
Status: DISCONTINUED | OUTPATIENT
Start: 2024-03-23 | End: 2024-03-27

## 2024-03-23 RX ORDER — DIPHENOXYLATE HCL/ATROPINE 2.5-.025/5
5 LIQUID (ML) ORAL 4 TIMES DAILY PRN
Status: DISCONTINUED | OUTPATIENT
Start: 2024-03-23 | End: 2024-03-23

## 2024-03-23 RX ORDER — SPIRONOLACTONE 50 MG/1
25 TABLET, FILM COATED ORAL 3 TIMES DAILY
Status: DISCONTINUED | OUTPATIENT
Start: 2024-03-23 | End: 2024-03-23

## 2024-03-23 RX ORDER — SPIRONOLACTONE 25 MG/1
12.5 TABLET ORAL 3 TIMES DAILY
Status: DISPENSED | OUTPATIENT
Start: 2024-03-23 | End: 2024-03-25

## 2024-03-23 RX ADMIN — POTASSIUM CHLORIDE 10 MEQ: 7.46 INJECTION, SOLUTION INTRAVENOUS at 06:02

## 2024-03-23 RX ADMIN — POTASSIUM CHLORIDE 10 MEQ: 7.46 INJECTION, SOLUTION INTRAVENOUS at 04:44

## 2024-03-23 RX ADMIN — DIBASIC SODIUM PHOSPHATE, MONOBASIC POTASSIUM PHOSPHATE AND MONOBASIC SODIUM PHOSPHATE 1 TABLET: 852; 155; 130 TABLET ORAL at 15:54

## 2024-03-23 RX ADMIN — SODIUM CHLORIDE, PRESERVATIVE FREE 10 ML: 5 INJECTION INTRAVENOUS at 20:10

## 2024-03-23 RX ADMIN — POTASSIUM CHLORIDE 10 MEQ: 7.46 INJECTION, SOLUTION INTRAVENOUS at 21:58

## 2024-03-23 RX ADMIN — Medication 5 ML: at 12:13

## 2024-03-23 RX ADMIN — Medication 1 TABLET: at 09:37

## 2024-03-23 RX ADMIN — FOLIC ACID 1 MG: 1 TABLET ORAL at 09:37

## 2024-03-23 RX ADMIN — SPIRONOLACTONE 12.5 MG: 25 TABLET ORAL at 20:09

## 2024-03-23 RX ADMIN — Medication 100 MG: at 09:37

## 2024-03-23 RX ADMIN — POTASSIUM CHLORIDE 10 MEQ: 7.46 INJECTION, SOLUTION INTRAVENOUS at 07:02

## 2024-03-23 RX ADMIN — POTASSIUM CHLORIDE 10 MEQ: 7.46 INJECTION, SOLUTION INTRAVENOUS at 03:36

## 2024-03-23 RX ADMIN — POTASSIUM CHLORIDE 10 MEQ: 7.46 INJECTION, SOLUTION INTRAVENOUS at 22:51

## 2024-03-23 RX ADMIN — POTASSIUM CHLORIDE 10 MEQ: 7.46 INJECTION, SOLUTION INTRAVENOUS at 01:17

## 2024-03-23 RX ADMIN — POTASSIUM BICARBONATE 20 MEQ: 782 TABLET, EFFERVESCENT ORAL at 20:08

## 2024-03-23 RX ADMIN — DIPHENOXYLATE HYDROCHLORIDE AND ATROPINE SULFATE 1 TABLET: 2.5; .025 TABLET ORAL at 20:08

## 2024-03-23 RX ADMIN — SPIRONOLACTONE 12.5 MG: 25 TABLET ORAL at 15:53

## 2024-03-23 RX ADMIN — ENOXAPARIN SODIUM 40 MG: 100 INJECTION SUBCUTANEOUS at 09:37

## 2024-03-23 RX ADMIN — POTASSIUM CHLORIDE 40 MEQ: 1500 TABLET, EXTENDED RELEASE ORAL at 17:27

## 2024-03-23 RX ADMIN — POTASSIUM CHLORIDE 10 MEQ: 7.46 INJECTION, SOLUTION INTRAVENOUS at 02:27

## 2024-03-23 RX ADMIN — DIBASIC SODIUM PHOSPHATE, MONOBASIC POTASSIUM PHOSPHATE AND MONOBASIC SODIUM PHOSPHATE 1 TABLET: 852; 155; 130 TABLET ORAL at 20:08

## 2024-03-23 RX ADMIN — POTASSIUM BICARBONATE 20 MEQ: 782 TABLET, EFFERVESCENT ORAL at 09:37

## 2024-03-23 RX ADMIN — POTASSIUM BICARBONATE 20 MEQ: 782 TABLET, EFFERVESCENT ORAL at 00:48

## 2024-03-23 RX ADMIN — SPIRONOLACTONE 12.5 MG: 25 TABLET ORAL at 09:47

## 2024-03-23 RX ADMIN — POTASSIUM CHLORIDE 10 MEQ: 7.46 INJECTION, SOLUTION INTRAVENOUS at 00:07

## 2024-03-23 RX ADMIN — SODIUM CHLORIDE, PRESERVATIVE FREE 10 ML: 5 INJECTION INTRAVENOUS at 09:38

## 2024-03-24 LAB
ANION GAP SERPL CALCULATED.3IONS-SCNC: 11 MMOL/L (ref 7–16)
ANION GAP SERPL CALCULATED.3IONS-SCNC: 8 MMOL/L (ref 7–16)
ASTROVIRUS PCR: NOT DETECTED
BUN SERPL-MCNC: 7 MG/DL (ref 6–23)
BUN SERPL-MCNC: 9 MG/DL (ref 6–23)
C CAYETANENSIS DNA SPEC QL NAA+PROBE: NOT DETECTED
CALCIUM SERPL-MCNC: 7.9 MG/DL (ref 8.3–10.6)
CALCIUM SERPL-MCNC: 8.5 MG/DL (ref 8.3–10.6)
CAMPY SP DNA.DIARRHEA STL QL NAA+PROBE: NOT DETECTED
CHLORIDE BLD-SCNC: 101 MMOL/L (ref 99–110)
CHLORIDE BLD-SCNC: 95 MMOL/L (ref 99–110)
CO2: 31 MMOL/L (ref 21–32)
CO2: 32 MMOL/L (ref 21–32)
CREAT SERPL-MCNC: 0.6 MG/DL (ref 0.9–1.3)
CREAT SERPL-MCNC: 0.7 MG/DL (ref 0.9–1.3)
CRYPTOSP DNA SPEC QL NAA+PROBE: NOT DETECTED
E COLI DNA SPEC QL NAA+PROBE: NOT DETECTED
E COLI ENTEROAGGREGATIVE PCR: NOT DETECTED
E COLI ENTEROPATHOGENIC PCR: NOT DETECTED
E COLI ENTEROTOXIGENIC PCR: NOT DETECTED
E COLI O157H7 DNA SPEC QL NAA+PROBE: NOT DETECTED
E HISTOLYT DNA SPEC QL NAA+PROBE: NOT DETECTED
EC STX1+STX2 + H7 FLIC SPEC NAA+PROBE: NOT DETECTED
G LAMBLIA DNA SPEC QL NAA+PROBE: NOT DETECTED
GFR SERPL CREATININE-BSD FRML MDRD: >60 ML/MIN/1.73M2
GFR SERPL CREATININE-BSD FRML MDRD: >60 ML/MIN/1.73M2
GLUCOSE SERPL-MCNC: 82 MG/DL (ref 70–99)
GLUCOSE SERPL-MCNC: 84 MG/DL (ref 70–99)
HADV DNA SPEC QL NAA+PROBE: NOT DETECTED
MAGNESIUM: 1.7 MG/DL (ref 1.8–2.4)
NOROVIRUS RNA SPEC QL NAA+PROBE: NOT DETECTED
P SHIGELLOIDES DNA STL QL NAA+PROBE: NOT DETECTED
PHOSPHORUS: 2.1 MG/DL (ref 2.5–4.9)
POTASSIUM SERPL-SCNC: 2.9 MMOL/L (ref 3.5–5.1)
POTASSIUM SERPL-SCNC: 3.1 MMOL/L (ref 3.5–5.1)
RV RNA SPEC QL NAA+PROBE: NOT DETECTED
SALMONELLA DNA SPEC QL NAA+PROBE: NOT DETECTED
SAPOVIRUS PCR: NOT DETECTED
SODIUM BLD-SCNC: 137 MMOL/L (ref 135–145)
SODIUM BLD-SCNC: 141 MMOL/L (ref 135–145)
V CHOLERAE DNA SPEC QL NAA+PROBE: NOT DETECTED
VIBRIO DNA SPEC NAA+PROBE: NOT DETECTED
YERSINIA DNA SPEC NAA+PROBE: NOT DETECTED

## 2024-03-24 PROCEDURE — 2580000003 HC RX 258: Performed by: STUDENT IN AN ORGANIZED HEALTH CARE EDUCATION/TRAINING PROGRAM

## 2024-03-24 PROCEDURE — 83735 ASSAY OF MAGNESIUM: CPT

## 2024-03-24 PROCEDURE — 6370000000 HC RX 637 (ALT 250 FOR IP): Performed by: INTERNAL MEDICINE

## 2024-03-24 PROCEDURE — 36415 COLL VENOUS BLD VENIPUNCTURE: CPT

## 2024-03-24 PROCEDURE — 6370000000 HC RX 637 (ALT 250 FOR IP): Performed by: STUDENT IN AN ORGANIZED HEALTH CARE EDUCATION/TRAINING PROGRAM

## 2024-03-24 PROCEDURE — 84100 ASSAY OF PHOSPHORUS: CPT

## 2024-03-24 PROCEDURE — 87507 IADNA-DNA/RNA PROBE TQ 12-25: CPT

## 2024-03-24 PROCEDURE — 94761 N-INVAS EAR/PLS OXIMETRY MLT: CPT

## 2024-03-24 PROCEDURE — 80048 BASIC METABOLIC PNL TOTAL CA: CPT

## 2024-03-24 PROCEDURE — 2060000000 HC ICU INTERMEDIATE R&B

## 2024-03-24 PROCEDURE — 6360000002 HC RX W HCPCS: Performed by: STUDENT IN AN ORGANIZED HEALTH CARE EDUCATION/TRAINING PROGRAM

## 2024-03-24 PROCEDURE — 6370000000 HC RX 637 (ALT 250 FOR IP): Performed by: SPECIALIST

## 2024-03-24 RX ORDER — LANOLIN ALCOHOL/MO/W.PET/CERES
400 CREAM (GRAM) TOPICAL DAILY
Status: DISCONTINUED | OUTPATIENT
Start: 2024-03-24 | End: 2024-04-03 | Stop reason: HOSPADM

## 2024-03-24 RX ORDER — SODIUM CHLORIDE, SODIUM LACTATE, POTASSIUM CHLORIDE, CALCIUM CHLORIDE 600; 310; 30; 20 MG/100ML; MG/100ML; MG/100ML; MG/100ML
INJECTION, SOLUTION INTRAVENOUS CONTINUOUS
Status: DISCONTINUED | OUTPATIENT
Start: 2024-03-24 | End: 2024-03-25

## 2024-03-24 RX ORDER — CHOLESTYRAMINE LIGHT 4 G/5.7G
4 POWDER, FOR SUSPENSION ORAL 2 TIMES DAILY
Status: DISCONTINUED | OUTPATIENT
Start: 2024-03-24 | End: 2024-03-26

## 2024-03-24 RX ADMIN — POTASSIUM CHLORIDE 10 MEQ: 7.46 INJECTION, SOLUTION INTRAVENOUS at 19:47

## 2024-03-24 RX ADMIN — POTASSIUM CHLORIDE 10 MEQ: 7.46 INJECTION, SOLUTION INTRAVENOUS at 20:29

## 2024-03-24 RX ADMIN — POTASSIUM CHLORIDE 10 MEQ: 7.46 INJECTION, SOLUTION INTRAVENOUS at 18:42

## 2024-03-24 RX ADMIN — SODIUM CHLORIDE, PRESERVATIVE FREE 10 ML: 5 INJECTION INTRAVENOUS at 20:21

## 2024-03-24 RX ADMIN — POTASSIUM CHLORIDE 10 MEQ: 7.46 INJECTION, SOLUTION INTRAVENOUS at 02:35

## 2024-03-24 RX ADMIN — DIBASIC SODIUM PHOSPHATE, MONOBASIC POTASSIUM PHOSPHATE AND MONOBASIC SODIUM PHOSPHATE 1 TABLET: 852; 155; 130 TABLET ORAL at 14:10

## 2024-03-24 RX ADMIN — FOLIC ACID 1 MG: 1 TABLET ORAL at 09:32

## 2024-03-24 RX ADMIN — SODIUM CHLORIDE, PRESERVATIVE FREE 10 ML: 5 INJECTION INTRAVENOUS at 09:32

## 2024-03-24 RX ADMIN — DIBASIC SODIUM PHOSPHATE, MONOBASIC POTASSIUM PHOSPHATE AND MONOBASIC SODIUM PHOSPHATE 1 TABLET: 852; 155; 130 TABLET ORAL at 09:38

## 2024-03-24 RX ADMIN — CHOLESTYRAMINE 4 G: 4 POWDER, FOR SUSPENSION ORAL at 20:21

## 2024-03-24 RX ADMIN — Medication 1 TABLET: at 09:32

## 2024-03-24 RX ADMIN — DIPHENOXYLATE HYDROCHLORIDE AND ATROPINE SULFATE 1 TABLET: 2.5; .025 TABLET ORAL at 15:57

## 2024-03-24 RX ADMIN — SODIUM CHLORIDE, POTASSIUM CHLORIDE, SODIUM LACTATE AND CALCIUM CHLORIDE: 600; 310; 30; 20 INJECTION, SOLUTION INTRAVENOUS at 09:46

## 2024-03-24 RX ADMIN — DIPHENOXYLATE HYDROCHLORIDE AND ATROPINE SULFATE 1 TABLET: 2.5; .025 TABLET ORAL at 02:34

## 2024-03-24 RX ADMIN — DIBASIC SODIUM PHOSPHATE, MONOBASIC POTASSIUM PHOSPHATE AND MONOBASIC SODIUM PHOSPHATE 1 TABLET: 852; 155; 130 TABLET ORAL at 20:21

## 2024-03-24 RX ADMIN — POTASSIUM CHLORIDE 10 MEQ: 7.46 INJECTION, SOLUTION INTRAVENOUS at 00:19

## 2024-03-24 RX ADMIN — POTASSIUM BICARBONATE 20 MEQ: 782 TABLET, EFFERVESCENT ORAL at 09:32

## 2024-03-24 RX ADMIN — POTASSIUM CHLORIDE 10 MEQ: 7.46 INJECTION, SOLUTION INTRAVENOUS at 04:34

## 2024-03-24 RX ADMIN — TRAMADOL HYDROCHLORIDE 50 MG: 50 TABLET, COATED ORAL at 20:21

## 2024-03-24 RX ADMIN — CHOLESTYRAMINE 4 G: 4 POWDER, FOR SUSPENSION ORAL at 14:11

## 2024-03-24 RX ADMIN — POTASSIUM CHLORIDE 10 MEQ: 7.46 INJECTION, SOLUTION INTRAVENOUS at 01:32

## 2024-03-24 RX ADMIN — POTASSIUM BICARBONATE 20 MEQ: 782 TABLET, EFFERVESCENT ORAL at 20:20

## 2024-03-24 RX ADMIN — DIPHENOXYLATE HYDROCHLORIDE AND ATROPINE SULFATE 1 TABLET: 2.5; .025 TABLET ORAL at 20:20

## 2024-03-24 RX ADMIN — Medication 100 MG: at 09:32

## 2024-03-24 RX ADMIN — POTASSIUM CHLORIDE 10 MEQ: 7.46 INJECTION, SOLUTION INTRAVENOUS at 23:40

## 2024-03-24 RX ADMIN — POTASSIUM BICARBONATE 20 MEQ: 782 TABLET, EFFERVESCENT ORAL at 14:11

## 2024-03-24 RX ADMIN — ENOXAPARIN SODIUM 40 MG: 100 INJECTION SUBCUTANEOUS at 09:32

## 2024-03-24 RX ADMIN — Medication 400 MG: at 09:32

## 2024-03-24 RX ADMIN — SPIRONOLACTONE 12.5 MG: 25 TABLET ORAL at 14:10

## 2024-03-24 RX ADMIN — SPIRONOLACTONE 12.5 MG: 25 TABLET ORAL at 09:32

## 2024-03-25 LAB
ALDOST SERPL-MCNC: 3.6 NG/DL
ANION GAP SERPL CALCULATED.3IONS-SCNC: 11 MMOL/L (ref 7–16)
ANION GAP SERPL CALCULATED.3IONS-SCNC: 7 MMOL/L (ref 7–16)
ANION GAP SERPL CALCULATED.3IONS-SCNC: 9 MMOL/L (ref 7–16)
APTT: 39.4 SECONDS (ref 25.1–37.1)
BASOPHILS ABSOLUTE: 0.1 K/CU MM
BASOPHILS RELATIVE PERCENT: 0.9 % (ref 0–1)
BUN SERPL-MCNC: 10 MG/DL (ref 6–23)
BUN SERPL-MCNC: 13 MG/DL (ref 6–23)
BUN SERPL-MCNC: 9 MG/DL (ref 6–23)
CALCIUM SERPL-MCNC: 8.1 MG/DL (ref 8.3–10.6)
CALCIUM SERPL-MCNC: 8.3 MG/DL (ref 8.3–10.6)
CALCIUM SERPL-MCNC: 8.4 MG/DL (ref 8.3–10.6)
CHLORIDE BLD-SCNC: 94 MMOL/L (ref 99–110)
CHLORIDE BLD-SCNC: 97 MMOL/L (ref 99–110)
CHLORIDE BLD-SCNC: 98 MMOL/L (ref 99–110)
CO2: 31 MMOL/L (ref 21–32)
CO2: 32 MMOL/L (ref 21–32)
CO2: 33 MMOL/L (ref 21–32)
CREAT SERPL-MCNC: 0.6 MG/DL (ref 0.9–1.3)
CREAT SERPL-MCNC: 0.6 MG/DL (ref 0.9–1.3)
CREAT SERPL-MCNC: 0.7 MG/DL (ref 0.9–1.3)
DIFFERENTIAL TYPE: ABNORMAL
EOSINOPHILS ABSOLUTE: 0.3 K/CU MM
EOSINOPHILS RELATIVE PERCENT: 3.3 % (ref 0–3)
GFR SERPL CREATININE-BSD FRML MDRD: >60 ML/MIN/1.73M2
GFR SERPL CREATININE-BSD FRML MDRD: >90 ML/MIN/1.73M2
GFR SERPL CREATININE-BSD FRML MDRD: >90 ML/MIN/1.73M2
GLUCOSE SERPL-MCNC: 108 MG/DL (ref 70–99)
GLUCOSE SERPL-MCNC: 81 MG/DL (ref 70–99)
GLUCOSE SERPL-MCNC: 91 MG/DL (ref 70–99)
HCT VFR BLD CALC: 33.1 % (ref 42–52)
HEMOGLOBIN: 11.1 GM/DL (ref 13.5–18)
IMMATURE NEUTROPHIL %: 0.9 % (ref 0–0.43)
INR BLD: 1.2 INDEX
LIPASE: 44 IU/L (ref 13–60)
LYMPHOCYTES ABSOLUTE: 1.9 K/CU MM
LYMPHOCYTES RELATIVE PERCENT: 19.3 % (ref 24–44)
MAGNESIUM: 1.7 MG/DL (ref 1.8–2.4)
MAGNESIUM: 1.7 MG/DL (ref 1.8–2.4)
MAGNESIUM: 2 MG/DL (ref 1.8–2.4)
MAGNESIUM: 2.2 MG/DL (ref 1.8–2.4)
MCH RBC QN AUTO: 31.2 PG (ref 27–31)
MCHC RBC AUTO-ENTMCNC: 33.5 % (ref 32–36)
MCV RBC AUTO: 93 FL (ref 78–100)
MONOCYTES ABSOLUTE: 0.9 K/CU MM
MONOCYTES RELATIVE PERCENT: 9.6 % (ref 0–4)
NUCLEATED RBC %: 0 %
PDW BLD-RTO: 15.1 % (ref 11.7–14.9)
PHOSPHORUS: 2.6 MG/DL (ref 2.5–4.9)
PLATELET # BLD: 255 K/CU MM (ref 140–440)
PMV BLD AUTO: 10.5 FL (ref 7.5–11.1)
POTASSIUM SERPL-SCNC: 2.8 MMOL/L (ref 3.5–5.1)
POTASSIUM SERPL-SCNC: 3.3 MMOL/L (ref 3.5–5.1)
POTASSIUM SERPL-SCNC: 3.4 MMOL/L (ref 3.5–5.1)
POTASSIUM SERPL-SCNC: 3.5 MMOL/L (ref 3.5–5.1)
PROTHROMBIN TIME: 15.8 SECONDS (ref 11.7–14.5)
RBC # BLD: 3.56 M/CU MM (ref 4.6–6.2)
SEGMENTED NEUTROPHILS ABSOLUTE COUNT: 6.5 K/CU MM
SEGMENTED NEUTROPHILS RELATIVE PERCENT: 66 % (ref 36–66)
SODIUM BLD-SCNC: 134 MMOL/L (ref 135–145)
SODIUM BLD-SCNC: 138 MMOL/L (ref 135–145)
SODIUM BLD-SCNC: 140 MMOL/L (ref 135–145)
TOTAL IMMATURE NEUTOROPHIL: 0.09 K/CU MM
TOTAL NUCLEATED RBC: 0 K/CU MM
WBC # BLD: 9.8 K/CU MM (ref 4–10.5)

## 2024-03-25 PROCEDURE — 6370000000 HC RX 637 (ALT 250 FOR IP): Performed by: INTERNAL MEDICINE

## 2024-03-25 PROCEDURE — 6360000002 HC RX W HCPCS: Performed by: STUDENT IN AN ORGANIZED HEALTH CARE EDUCATION/TRAINING PROGRAM

## 2024-03-25 PROCEDURE — 36415 COLL VENOUS BLD VENIPUNCTURE: CPT

## 2024-03-25 PROCEDURE — 6370000000 HC RX 637 (ALT 250 FOR IP): Performed by: STUDENT IN AN ORGANIZED HEALTH CARE EDUCATION/TRAINING PROGRAM

## 2024-03-25 PROCEDURE — 83690 ASSAY OF LIPASE: CPT

## 2024-03-25 PROCEDURE — 80048 BASIC METABOLIC PNL TOTAL CA: CPT

## 2024-03-25 PROCEDURE — 84132 ASSAY OF SERUM POTASSIUM: CPT

## 2024-03-25 PROCEDURE — 85730 THROMBOPLASTIN TIME PARTIAL: CPT

## 2024-03-25 PROCEDURE — 2060000000 HC ICU INTERMEDIATE R&B

## 2024-03-25 PROCEDURE — 2580000003 HC RX 258: Performed by: STUDENT IN AN ORGANIZED HEALTH CARE EDUCATION/TRAINING PROGRAM

## 2024-03-25 PROCEDURE — 85025 COMPLETE CBC W/AUTO DIFF WBC: CPT

## 2024-03-25 PROCEDURE — 97530 THERAPEUTIC ACTIVITIES: CPT

## 2024-03-25 PROCEDURE — 85610 PROTHROMBIN TIME: CPT

## 2024-03-25 PROCEDURE — 83735 ASSAY OF MAGNESIUM: CPT

## 2024-03-25 PROCEDURE — 84100 ASSAY OF PHOSPHORUS: CPT

## 2024-03-25 PROCEDURE — 6370000000 HC RX 637 (ALT 250 FOR IP): Performed by: SPECIALIST

## 2024-03-25 PROCEDURE — 94761 N-INVAS EAR/PLS OXIMETRY MLT: CPT

## 2024-03-25 PROCEDURE — 80053 COMPREHEN METABOLIC PANEL: CPT

## 2024-03-25 RX ORDER — SPIRONOLACTONE 25 MG/1
12.5 TABLET ORAL 3 TIMES DAILY
Status: DISCONTINUED | OUTPATIENT
Start: 2024-03-25 | End: 2024-03-29

## 2024-03-25 RX ORDER — MAGNESIUM SULFATE IN WATER 40 MG/ML
2000 INJECTION, SOLUTION INTRAVENOUS ONCE
Status: COMPLETED | OUTPATIENT
Start: 2024-03-25 | End: 2024-03-25

## 2024-03-25 RX ORDER — POTASSIUM CHLORIDE 7.45 MG/ML
10 INJECTION INTRAVENOUS
Status: COMPLETED | OUTPATIENT
Start: 2024-03-25 | End: 2024-03-25

## 2024-03-25 RX ADMIN — POTASSIUM CHLORIDE 10 MEQ: 7.46 INJECTION, SOLUTION INTRAVENOUS at 05:55

## 2024-03-25 RX ADMIN — MAGNESIUM SULFATE HEPTAHYDRATE 2000 MG: 40 INJECTION, SOLUTION INTRAVENOUS at 10:41

## 2024-03-25 RX ADMIN — SODIUM CHLORIDE, PRESERVATIVE FREE 10 ML: 5 INJECTION INTRAVENOUS at 10:48

## 2024-03-25 RX ADMIN — DIBASIC SODIUM PHOSPHATE, MONOBASIC POTASSIUM PHOSPHATE AND MONOBASIC SODIUM PHOSPHATE 1 TABLET: 852; 155; 130 TABLET ORAL at 21:31

## 2024-03-25 RX ADMIN — POTASSIUM CHLORIDE 10 MEQ: 7.46 INJECTION, SOLUTION INTRAVENOUS at 13:43

## 2024-03-25 RX ADMIN — CHOLESTYRAMINE 4 G: 4 POWDER, FOR SUSPENSION ORAL at 10:46

## 2024-03-25 RX ADMIN — POTASSIUM CHLORIDE 10 MEQ: 7.46 INJECTION, SOLUTION INTRAVENOUS at 09:35

## 2024-03-25 RX ADMIN — SODIUM CHLORIDE, POTASSIUM CHLORIDE, SODIUM LACTATE AND CALCIUM CHLORIDE: 600; 310; 30; 20 INJECTION, SOLUTION INTRAVENOUS at 10:26

## 2024-03-25 RX ADMIN — POTASSIUM BICARBONATE 40 MEQ: 782 TABLET, EFFERVESCENT ORAL at 10:48

## 2024-03-25 RX ADMIN — SODIUM CHLORIDE, POTASSIUM CHLORIDE, SODIUM LACTATE AND CALCIUM CHLORIDE: 600; 310; 30; 20 INJECTION, SOLUTION INTRAVENOUS at 09:34

## 2024-03-25 RX ADMIN — POTASSIUM CHLORIDE 10 MEQ: 7.46 INJECTION, SOLUTION INTRAVENOUS at 16:01

## 2024-03-25 RX ADMIN — ENOXAPARIN SODIUM 40 MG: 100 INJECTION SUBCUTANEOUS at 10:47

## 2024-03-25 RX ADMIN — SODIUM CHLORIDE, POTASSIUM CHLORIDE, SODIUM LACTATE AND CALCIUM CHLORIDE: 600; 310; 30; 20 INJECTION, SOLUTION INTRAVENOUS at 13:05

## 2024-03-25 RX ADMIN — SPIRONOLACTONE 12.5 MG: 25 TABLET ORAL at 16:01

## 2024-03-25 RX ADMIN — POTASSIUM CHLORIDE 10 MEQ: 7.46 INJECTION, SOLUTION INTRAVENOUS at 07:01

## 2024-03-25 RX ADMIN — Medication 100 MG: at 10:47

## 2024-03-25 RX ADMIN — POTASSIUM CHLORIDE 10 MEQ: 7.46 INJECTION, SOLUTION INTRAVENOUS at 09:38

## 2024-03-25 RX ADMIN — FOLIC ACID 1 MG: 1 TABLET ORAL at 10:47

## 2024-03-25 RX ADMIN — POTASSIUM CHLORIDE 10 MEQ: 7.46 INJECTION, SOLUTION INTRAVENOUS at 10:30

## 2024-03-25 RX ADMIN — POTASSIUM BICARBONATE 40 MEQ: 782 TABLET, EFFERVESCENT ORAL at 21:30

## 2024-03-25 RX ADMIN — Medication 1 TABLET: at 10:46

## 2024-03-25 RX ADMIN — SPIRONOLACTONE 12.5 MG: 25 TABLET ORAL at 21:31

## 2024-03-25 RX ADMIN — DIBASIC SODIUM PHOSPHATE, MONOBASIC POTASSIUM PHOSPHATE AND MONOBASIC SODIUM PHOSPHATE 1 TABLET: 852; 155; 130 TABLET ORAL at 16:01

## 2024-03-25 RX ADMIN — SPIRONOLACTONE 12.5 MG: 25 TABLET ORAL at 10:46

## 2024-03-25 RX ADMIN — SODIUM CHLORIDE, PRESERVATIVE FREE 10 ML: 5 INJECTION INTRAVENOUS at 21:30

## 2024-03-25 RX ADMIN — POTASSIUM CHLORIDE 10 MEQ: 7.46 INJECTION, SOLUTION INTRAVENOUS at 00:32

## 2024-03-25 RX ADMIN — POTASSIUM CHLORIDE 10 MEQ: 7.46 INJECTION, SOLUTION INTRAVENOUS at 12:06

## 2024-03-25 RX ADMIN — CHOLESTYRAMINE 4 G: 4 POWDER, FOR SUSPENSION ORAL at 21:30

## 2024-03-25 RX ADMIN — DIBASIC SODIUM PHOSPHATE, MONOBASIC POTASSIUM PHOSPHATE AND MONOBASIC SODIUM PHOSPHATE 1 TABLET: 852; 155; 130 TABLET ORAL at 10:47

## 2024-03-26 LAB
ANION GAP SERPL CALCULATED.3IONS-SCNC: 8 MMOL/L (ref 7–16)
BUN SERPL-MCNC: 12 MG/DL (ref 6–23)
CALCIUM SERPL-MCNC: 7.8 MG/DL (ref 8.3–10.6)
CHLORIDE BLD-SCNC: 97 MMOL/L (ref 99–110)
CO2: 33 MMOL/L (ref 21–32)
CREAT SERPL-MCNC: 0.6 MG/DL (ref 0.9–1.3)
GFR SERPL CREATININE-BSD FRML MDRD: >90 ML/MIN/1.73M2
GLUCOSE SERPL-MCNC: 94 MG/DL (ref 70–99)
MAGNESIUM: 1.9 MG/DL (ref 1.8–2.4)
OSMOLALITY UR: 286 MOS/L (ref 292–1090)
PHOSPHORUS: 2.8 MG/DL (ref 2.5–4.9)
POTASSIUM SERPL-SCNC: 3.1 MMOL/L (ref 3.5–5.1)
RENIN PLAS-CCNC: 0.1 NG/ML/HR
SODIUM BLD-SCNC: 138 MMOL/L (ref 135–145)

## 2024-03-26 PROCEDURE — 2060000000 HC ICU INTERMEDIATE R&B

## 2024-03-26 PROCEDURE — 6370000000 HC RX 637 (ALT 250 FOR IP): Performed by: STUDENT IN AN ORGANIZED HEALTH CARE EDUCATION/TRAINING PROGRAM

## 2024-03-26 PROCEDURE — 87449 NOS EACH ORGANISM AG IA: CPT

## 2024-03-26 PROCEDURE — 36415 COLL VENOUS BLD VENIPUNCTURE: CPT

## 2024-03-26 PROCEDURE — 83735 ASSAY OF MAGNESIUM: CPT

## 2024-03-26 PROCEDURE — 6370000000 HC RX 637 (ALT 250 FOR IP): Performed by: INTERNAL MEDICINE

## 2024-03-26 PROCEDURE — 2580000003 HC RX 258: Performed by: STUDENT IN AN ORGANIZED HEALTH CARE EDUCATION/TRAINING PROGRAM

## 2024-03-26 PROCEDURE — 87324 CLOSTRIDIUM AG IA: CPT

## 2024-03-26 PROCEDURE — 6370000000 HC RX 637 (ALT 250 FOR IP): Performed by: SPECIALIST

## 2024-03-26 PROCEDURE — 84100 ASSAY OF PHOSPHORUS: CPT

## 2024-03-26 PROCEDURE — 80048 BASIC METABOLIC PNL TOTAL CA: CPT

## 2024-03-26 PROCEDURE — 6360000002 HC RX W HCPCS: Performed by: STUDENT IN AN ORGANIZED HEALTH CARE EDUCATION/TRAINING PROGRAM

## 2024-03-26 PROCEDURE — 97530 THERAPEUTIC ACTIVITIES: CPT

## 2024-03-26 PROCEDURE — 94761 N-INVAS EAR/PLS OXIMETRY MLT: CPT

## 2024-03-26 RX ORDER — POTASSIUM CHLORIDE 20 MEQ/1
40 TABLET, EXTENDED RELEASE ORAL
Status: DISCONTINUED | OUTPATIENT
Start: 2024-03-26 | End: 2024-03-30

## 2024-03-26 RX ORDER — VANCOMYCIN HYDROCHLORIDE 125 MG/1
125 CAPSULE ORAL 4 TIMES DAILY
Status: DISCONTINUED | OUTPATIENT
Start: 2024-03-26 | End: 2024-03-28

## 2024-03-26 RX ADMIN — DIPHENOXYLATE HYDROCHLORIDE AND ATROPINE SULFATE 1 TABLET: 2.5; .025 TABLET ORAL at 10:00

## 2024-03-26 RX ADMIN — FOLIC ACID 1 MG: 1 TABLET ORAL at 09:49

## 2024-03-26 RX ADMIN — POTASSIUM CHLORIDE 40 MEQ: 1500 TABLET, EXTENDED RELEASE ORAL at 11:53

## 2024-03-26 RX ADMIN — ENOXAPARIN SODIUM 40 MG: 100 INJECTION SUBCUTANEOUS at 10:12

## 2024-03-26 RX ADMIN — VANCOMYCIN HYDROCHLORIDE 125 MG: 125 CAPSULE ORAL at 20:46

## 2024-03-26 RX ADMIN — VANCOMYCIN HYDROCHLORIDE 125 MG: 125 CAPSULE ORAL at 17:30

## 2024-03-26 RX ADMIN — DIPHENOXYLATE HYDROCHLORIDE AND ATROPINE SULFATE 1 TABLET: 2.5; .025 TABLET ORAL at 17:31

## 2024-03-26 RX ADMIN — SODIUM CHLORIDE, PRESERVATIVE FREE 10 ML: 5 INJECTION INTRAVENOUS at 20:46

## 2024-03-26 RX ADMIN — SPIRONOLACTONE 12.5 MG: 25 TABLET ORAL at 20:46

## 2024-03-26 RX ADMIN — POTASSIUM CHLORIDE 40 MEQ: 1500 TABLET, EXTENDED RELEASE ORAL at 06:03

## 2024-03-26 RX ADMIN — Medication 100 MG: at 09:49

## 2024-03-26 RX ADMIN — POTASSIUM CHLORIDE 40 MEQ: 1500 TABLET, EXTENDED RELEASE ORAL at 09:48

## 2024-03-26 RX ADMIN — Medication 1 TABLET: at 09:49

## 2024-03-26 RX ADMIN — POTASSIUM CHLORIDE 40 MEQ: 1500 TABLET, EXTENDED RELEASE ORAL at 17:59

## 2024-03-26 RX ADMIN — SPIRONOLACTONE 12.5 MG: 25 TABLET ORAL at 17:31

## 2024-03-26 RX ADMIN — SODIUM CHLORIDE, PRESERVATIVE FREE 10 ML: 5 INJECTION INTRAVENOUS at 10:13

## 2024-03-26 RX ADMIN — SPIRONOLACTONE 12.5 MG: 25 TABLET ORAL at 10:13

## 2024-03-27 LAB
ANION GAP SERPL CALCULATED.3IONS-SCNC: 7 MMOL/L (ref 7–16)
BUN SERPL-MCNC: 9 MG/DL (ref 6–23)
CALCIUM SERPL-MCNC: 8.1 MG/DL (ref 8.3–10.6)
CHLORIDE BLD-SCNC: 99 MMOL/L (ref 99–110)
CO2: 33 MMOL/L (ref 21–32)
CREAT SERPL-MCNC: 0.6 MG/DL (ref 0.9–1.3)
GFR SERPL CREATININE-BSD FRML MDRD: >90 ML/MIN/1.73M2
GLUCOSE SERPL-MCNC: 95 MG/DL (ref 70–99)
MAGNESIUM: 1.7 MG/DL (ref 1.8–2.4)
MAGNESIUM: 2.1 MG/DL (ref 1.8–2.4)
PHOSPHORUS: 2.6 MG/DL (ref 2.5–4.9)
POTASSIUM SERPL-SCNC: 3.4 MMOL/L (ref 3.5–5.1)
SODIUM BLD-SCNC: 139 MMOL/L (ref 135–145)

## 2024-03-27 PROCEDURE — 36415 COLL VENOUS BLD VENIPUNCTURE: CPT

## 2024-03-27 PROCEDURE — 6360000002 HC RX W HCPCS: Performed by: STUDENT IN AN ORGANIZED HEALTH CARE EDUCATION/TRAINING PROGRAM

## 2024-03-27 PROCEDURE — 2580000003 HC RX 258: Performed by: STUDENT IN AN ORGANIZED HEALTH CARE EDUCATION/TRAINING PROGRAM

## 2024-03-27 PROCEDURE — 80048 BASIC METABOLIC PNL TOTAL CA: CPT

## 2024-03-27 PROCEDURE — 94761 N-INVAS EAR/PLS OXIMETRY MLT: CPT

## 2024-03-27 PROCEDURE — 6370000000 HC RX 637 (ALT 250 FOR IP): Performed by: INTERNAL MEDICINE

## 2024-03-27 PROCEDURE — 6370000000 HC RX 637 (ALT 250 FOR IP): Performed by: STUDENT IN AN ORGANIZED HEALTH CARE EDUCATION/TRAINING PROGRAM

## 2024-03-27 PROCEDURE — 2060000000 HC ICU INTERMEDIATE R&B

## 2024-03-27 PROCEDURE — 83735 ASSAY OF MAGNESIUM: CPT

## 2024-03-27 PROCEDURE — 84100 ASSAY OF PHOSPHORUS: CPT

## 2024-03-27 RX ORDER — LOPERAMIDE HYDROCHLORIDE 2 MG/1
2 CAPSULE ORAL 4 TIMES DAILY PRN
Status: DISCONTINUED | OUTPATIENT
Start: 2024-03-27 | End: 2024-03-31

## 2024-03-27 RX ORDER — MAGNESIUM SULFATE IN WATER 40 MG/ML
2000 INJECTION, SOLUTION INTRAVENOUS ONCE
Status: COMPLETED | OUTPATIENT
Start: 2024-03-27 | End: 2024-03-27

## 2024-03-27 RX ADMIN — Medication 1 TABLET: at 09:33

## 2024-03-27 RX ADMIN — FOLIC ACID 1 MG: 1 TABLET ORAL at 09:33

## 2024-03-27 RX ADMIN — SPIRONOLACTONE 12.5 MG: 25 TABLET ORAL at 20:53

## 2024-03-27 RX ADMIN — DIPHENOXYLATE HYDROCHLORIDE AND ATROPINE SULFATE 1 TABLET: 2.5; .025 TABLET ORAL at 09:33

## 2024-03-27 RX ADMIN — POTASSIUM CHLORIDE 40 MEQ: 1500 TABLET, EXTENDED RELEASE ORAL at 09:33

## 2024-03-27 RX ADMIN — POTASSIUM CHLORIDE 40 MEQ: 1500 TABLET, EXTENDED RELEASE ORAL at 16:34

## 2024-03-27 RX ADMIN — ENOXAPARIN SODIUM 40 MG: 100 INJECTION SUBCUTANEOUS at 09:33

## 2024-03-27 RX ADMIN — VANCOMYCIN HYDROCHLORIDE 125 MG: 125 CAPSULE ORAL at 20:53

## 2024-03-27 RX ADMIN — Medication 100 MG: at 09:33

## 2024-03-27 RX ADMIN — VANCOMYCIN HYDROCHLORIDE 125 MG: 125 CAPSULE ORAL at 12:36

## 2024-03-27 RX ADMIN — POTASSIUM CHLORIDE 40 MEQ: 1500 TABLET, EXTENDED RELEASE ORAL at 12:36

## 2024-03-27 RX ADMIN — SODIUM CHLORIDE, PRESERVATIVE FREE 10 ML: 5 INJECTION INTRAVENOUS at 09:33

## 2024-03-27 RX ADMIN — MAGNESIUM SULFATE HEPTAHYDRATE 2000 MG: 40 INJECTION, SOLUTION INTRAVENOUS at 12:39

## 2024-03-27 RX ADMIN — VANCOMYCIN HYDROCHLORIDE 125 MG: 125 CAPSULE ORAL at 16:35

## 2024-03-27 RX ADMIN — VANCOMYCIN HYDROCHLORIDE 125 MG: 125 CAPSULE ORAL at 09:32

## 2024-03-27 RX ADMIN — SODIUM CHLORIDE, PRESERVATIVE FREE 10 ML: 5 INJECTION INTRAVENOUS at 20:54

## 2024-03-27 RX ADMIN — SPIRONOLACTONE 12.5 MG: 25 TABLET ORAL at 09:33

## 2024-03-27 RX ADMIN — SPIRONOLACTONE 12.5 MG: 25 TABLET ORAL at 16:34

## 2024-03-28 ENCOUNTER — APPOINTMENT (OUTPATIENT)
Dept: CT IMAGING | Age: 79
DRG: 640 | End: 2024-03-28
Payer: COMMERCIAL

## 2024-03-28 LAB
ANION GAP SERPL CALCULATED.3IONS-SCNC: 7 MMOL/L (ref 7–16)
BUN SERPL-MCNC: 8 MG/DL (ref 6–23)
CALCIUM SERPL-MCNC: 8.2 MG/DL (ref 8.3–10.6)
CHLORIDE BLD-SCNC: 99 MMOL/L (ref 99–110)
CO2: 32 MMOL/L (ref 21–32)
CORTISOL - AM: 14.8 UG/DL (ref 6–18.4)
CREAT SERPL-MCNC: 0.7 MG/DL (ref 0.9–1.3)
GFR SERPL CREATININE-BSD FRML MDRD: >90 ML/MIN/1.73M2
GLUCOSE SERPL-MCNC: 99 MG/DL (ref 70–99)
MAGNESIUM: 1.9 MG/DL (ref 1.8–2.4)
PHOSPHORUS: 2.5 MG/DL (ref 2.5–4.9)
POTASSIUM SERPL-SCNC: 3 MMOL/L (ref 3.5–5.1)
SODIUM BLD-SCNC: 138 MMOL/L (ref 135–145)
TSH SERPL DL<=0.005 MIU/L-ACNC: 0.41 UIU/ML (ref 0.27–4.2)

## 2024-03-28 PROCEDURE — 2580000003 HC RX 258: Performed by: STUDENT IN AN ORGANIZED HEALTH CARE EDUCATION/TRAINING PROGRAM

## 2024-03-28 PROCEDURE — 82533 TOTAL CORTISOL: CPT

## 2024-03-28 PROCEDURE — 6360000002 HC RX W HCPCS: Performed by: STUDENT IN AN ORGANIZED HEALTH CARE EDUCATION/TRAINING PROGRAM

## 2024-03-28 PROCEDURE — 6370000000 HC RX 637 (ALT 250 FOR IP): Performed by: STUDENT IN AN ORGANIZED HEALTH CARE EDUCATION/TRAINING PROGRAM

## 2024-03-28 PROCEDURE — 74176 CT ABD & PELVIS W/O CONTRAST: CPT

## 2024-03-28 PROCEDURE — 6370000000 HC RX 637 (ALT 250 FOR IP): Performed by: INTERNAL MEDICINE

## 2024-03-28 PROCEDURE — 94761 N-INVAS EAR/PLS OXIMETRY MLT: CPT

## 2024-03-28 PROCEDURE — 83735 ASSAY OF MAGNESIUM: CPT

## 2024-03-28 PROCEDURE — 80048 BASIC METABOLIC PNL TOTAL CA: CPT

## 2024-03-28 PROCEDURE — 84100 ASSAY OF PHOSPHORUS: CPT

## 2024-03-28 PROCEDURE — 84443 ASSAY THYROID STIM HORMONE: CPT

## 2024-03-28 PROCEDURE — 2060000000 HC ICU INTERMEDIATE R&B

## 2024-03-28 PROCEDURE — 36415 COLL VENOUS BLD VENIPUNCTURE: CPT

## 2024-03-28 PROCEDURE — 83516 IMMUNOASSAY NONANTIBODY: CPT

## 2024-03-28 RX ADMIN — POTASSIUM CHLORIDE 10 MEQ: 7.46 INJECTION, SOLUTION INTRAVENOUS at 12:01

## 2024-03-28 RX ADMIN — SPIRONOLACTONE 12.5 MG: 25 TABLET ORAL at 09:28

## 2024-03-28 RX ADMIN — POTASSIUM CHLORIDE 40 MEQ: 1500 TABLET, EXTENDED RELEASE ORAL at 09:27

## 2024-03-28 RX ADMIN — SODIUM CHLORIDE, PRESERVATIVE FREE 10 ML: 5 INJECTION INTRAVENOUS at 09:26

## 2024-03-28 RX ADMIN — POTASSIUM CHLORIDE 40 MEQ: 1500 TABLET, EXTENDED RELEASE ORAL at 13:45

## 2024-03-28 RX ADMIN — Medication 1 TABLET: at 09:27

## 2024-03-28 RX ADMIN — POTASSIUM CHLORIDE 10 MEQ: 7.46 INJECTION, SOLUTION INTRAVENOUS at 09:34

## 2024-03-28 RX ADMIN — POTASSIUM CHLORIDE 10 MEQ: 7.46 INJECTION, SOLUTION INTRAVENOUS at 04:28

## 2024-03-28 RX ADMIN — SPIRONOLACTONE 12.5 MG: 25 TABLET ORAL at 20:19

## 2024-03-28 RX ADMIN — Medication 100 MG: at 09:27

## 2024-03-28 RX ADMIN — SODIUM CHLORIDE, PRESERVATIVE FREE 10 ML: 5 INJECTION INTRAVENOUS at 20:19

## 2024-03-28 RX ADMIN — POTASSIUM CHLORIDE 10 MEQ: 7.46 INJECTION, SOLUTION INTRAVENOUS at 10:41

## 2024-03-28 RX ADMIN — SPIRONOLACTONE 12.5 MG: 25 TABLET ORAL at 16:25

## 2024-03-28 RX ADMIN — FOLIC ACID 1 MG: 1 TABLET ORAL at 09:27

## 2024-03-28 RX ADMIN — POTASSIUM CHLORIDE 10 MEQ: 7.46 INJECTION, SOLUTION INTRAVENOUS at 06:29

## 2024-03-28 RX ADMIN — POTASSIUM CHLORIDE 10 MEQ: 7.46 INJECTION, SOLUTION INTRAVENOUS at 05:17

## 2024-03-28 RX ADMIN — POTASSIUM CHLORIDE 40 MEQ: 1500 TABLET, EXTENDED RELEASE ORAL at 16:25

## 2024-03-28 RX ADMIN — VANCOMYCIN HYDROCHLORIDE 125 MG: 125 CAPSULE ORAL at 09:26

## 2024-03-28 RX ADMIN — ENOXAPARIN SODIUM 40 MG: 100 INJECTION SUBCUTANEOUS at 09:27

## 2024-03-28 NOTE — CARE COORDINATION
CM reviewed chart and saw pt at bedside.  Pt's daughter, Wallace at bedside she states that he John Paul has not walked in years.  CM sent WB to PT.  Pt had CT today, disimpaction and enemas needed per Dr. Calderon note.  Pt's RN Romario states he attempted disimpaction and enema with no results and was unable to palpate stool.  CM sent perfect serve to Dr. Stewart.  Plan con't to remain return to [facility].

## 2024-03-29 PROBLEM — E43 SEVERE MALNUTRITION (HCC): Chronic | Status: ACTIVE | Noted: 2024-03-29

## 2024-03-29 LAB
ANION GAP SERPL CALCULATED.3IONS-SCNC: 7 MMOL/L (ref 7–16)
ANION GAP SERPL CALCULATED.3IONS-SCNC: 7 MMOL/L (ref 7–16)
APTT: 41.6 SECONDS (ref 25.1–37.1)
BASOPHILS ABSOLUTE: 0.1 K/CU MM
BASOPHILS RELATIVE PERCENT: 0.9 % (ref 0–1)
BUN SERPL-MCNC: 10 MG/DL (ref 6–23)
BUN SERPL-MCNC: 12 MG/DL (ref 6–23)
CALCIUM SERPL-MCNC: 8.6 MG/DL (ref 8.3–10.6)
CALCIUM SERPL-MCNC: 8.7 MG/DL (ref 8.3–10.6)
CHLORIDE BLD-SCNC: 95 MMOL/L (ref 99–110)
CHLORIDE BLD-SCNC: 97 MMOL/L (ref 99–110)
CO2: 30 MMOL/L (ref 21–32)
CO2: 31 MMOL/L (ref 21–32)
CREAT SERPL-MCNC: 0.6 MG/DL (ref 0.9–1.3)
CREAT SERPL-MCNC: 0.6 MG/DL (ref 0.9–1.3)
DIFFERENTIAL TYPE: ABNORMAL
EOSINOPHILS ABSOLUTE: 0.2 K/CU MM
EOSINOPHILS RELATIVE PERCENT: 2.2 % (ref 0–3)
GFR SERPL CREATININE-BSD FRML MDRD: >90 ML/MIN/1.73M2
GFR SERPL CREATININE-BSD FRML MDRD: >90 ML/MIN/1.73M2
GLUCOSE SERPL-MCNC: 103 MG/DL (ref 70–99)
GLUCOSE SERPL-MCNC: 90 MG/DL (ref 70–99)
HCT VFR BLD CALC: 33.9 % (ref 42–52)
HEMOGLOBIN: 11.1 GM/DL (ref 13.5–18)
IMMATURE NEUTROPHIL %: 0.6 % (ref 0–0.43)
INR BLD: 1.2 INDEX
LIPASE: 65 IU/L (ref 13–60)
LYMPHOCYTES ABSOLUTE: 1.5 K/CU MM
LYMPHOCYTES RELATIVE PERCENT: 17.4 % (ref 24–44)
MAGNESIUM: 1.7 MG/DL (ref 1.8–2.4)
MCH RBC QN AUTO: 31.3 PG (ref 27–31)
MCHC RBC AUTO-ENTMCNC: 32.7 % (ref 32–36)
MCV RBC AUTO: 95.5 FL (ref 78–100)
MONOCYTES ABSOLUTE: 0.9 K/CU MM
MONOCYTES RELATIVE PERCENT: 10.2 % (ref 0–4)
NUCLEATED RBC %: 0 %
PDW BLD-RTO: 14 % (ref 11.7–14.9)
PHOSPHORUS: 2.5 MG/DL (ref 2.5–4.9)
PLATELET # BLD: 312 K/CU MM (ref 140–440)
PMV BLD AUTO: 10.4 FL (ref 7.5–11.1)
POTASSIUM SERPL-SCNC: 3.5 MMOL/L (ref 3.5–5.1)
POTASSIUM SERPL-SCNC: 4.2 MMOL/L (ref 3.5–5.1)
PROTHROMBIN TIME: 15.3 SECONDS (ref 11.7–14.5)
RBC # BLD: 3.55 M/CU MM (ref 4.6–6.2)
SEGMENTED NEUTROPHILS ABSOLUTE COUNT: 5.9 K/CU MM
SEGMENTED NEUTROPHILS RELATIVE PERCENT: 68.7 % (ref 36–66)
SODIUM BLD-SCNC: 132 MMOL/L (ref 135–145)
SODIUM BLD-SCNC: 135 MMOL/L (ref 135–145)
TOTAL IMMATURE NEUTOROPHIL: 0.05 K/CU MM
TOTAL NUCLEATED RBC: 0 K/CU MM
WBC # BLD: 8.6 K/CU MM (ref 4–10.5)

## 2024-03-29 PROCEDURE — 2060000000 HC ICU INTERMEDIATE R&B

## 2024-03-29 PROCEDURE — 2580000003 HC RX 258: Performed by: STUDENT IN AN ORGANIZED HEALTH CARE EDUCATION/TRAINING PROGRAM

## 2024-03-29 PROCEDURE — 80053 COMPREHEN METABOLIC PANEL: CPT

## 2024-03-29 PROCEDURE — 6360000002 HC RX W HCPCS: Performed by: STUDENT IN AN ORGANIZED HEALTH CARE EDUCATION/TRAINING PROGRAM

## 2024-03-29 PROCEDURE — 6370000000 HC RX 637 (ALT 250 FOR IP): Performed by: STUDENT IN AN ORGANIZED HEALTH CARE EDUCATION/TRAINING PROGRAM

## 2024-03-29 PROCEDURE — 83735 ASSAY OF MAGNESIUM: CPT

## 2024-03-29 PROCEDURE — 36415 COLL VENOUS BLD VENIPUNCTURE: CPT

## 2024-03-29 PROCEDURE — 83690 ASSAY OF LIPASE: CPT

## 2024-03-29 PROCEDURE — 94761 N-INVAS EAR/PLS OXIMETRY MLT: CPT

## 2024-03-29 PROCEDURE — 6370000000 HC RX 637 (ALT 250 FOR IP): Performed by: INTERNAL MEDICINE

## 2024-03-29 PROCEDURE — 97530 THERAPEUTIC ACTIVITIES: CPT

## 2024-03-29 PROCEDURE — 85610 PROTHROMBIN TIME: CPT

## 2024-03-29 PROCEDURE — 84100 ASSAY OF PHOSPHORUS: CPT

## 2024-03-29 PROCEDURE — 97112 NEUROMUSCULAR REEDUCATION: CPT

## 2024-03-29 PROCEDURE — 80048 BASIC METABOLIC PNL TOTAL CA: CPT

## 2024-03-29 PROCEDURE — 85730 THROMBOPLASTIN TIME PARTIAL: CPT

## 2024-03-29 PROCEDURE — 85025 COMPLETE CBC W/AUTO DIFF WBC: CPT

## 2024-03-29 RX ORDER — CARBOXYMETHYLCELLULOSE SODIUM 10 MG/ML
2 GEL OPHTHALMIC 2 TIMES DAILY
Status: DISCONTINUED | OUTPATIENT
Start: 2024-03-29 | End: 2024-04-03 | Stop reason: HOSPADM

## 2024-03-29 RX ORDER — MAGNESIUM SULFATE IN WATER 40 MG/ML
2000 INJECTION, SOLUTION INTRAVENOUS ONCE
Status: COMPLETED | OUTPATIENT
Start: 2024-03-29 | End: 2024-03-29

## 2024-03-29 RX ORDER — SPIRONOLACTONE 25 MG/1
12.5 TABLET ORAL 3 TIMES DAILY
Status: DISCONTINUED | OUTPATIENT
Start: 2024-03-29 | End: 2024-04-02

## 2024-03-29 RX ADMIN — SPIRONOLACTONE 12.5 MG: 25 TABLET ORAL at 07:57

## 2024-03-29 RX ADMIN — SPIRONOLACTONE 12.5 MG: 25 TABLET ORAL at 14:25

## 2024-03-29 RX ADMIN — Medication 100 MG: at 07:56

## 2024-03-29 RX ADMIN — POTASSIUM CHLORIDE 40 MEQ: 1500 TABLET, EXTENDED RELEASE ORAL at 17:10

## 2024-03-29 RX ADMIN — FOLIC ACID 1 MG: 1 TABLET ORAL at 07:56

## 2024-03-29 RX ADMIN — SPIRONOLACTONE 12.5 MG: 25 TABLET ORAL at 20:39

## 2024-03-29 RX ADMIN — MAGNESIUM SULFATE HEPTAHYDRATE 2000 MG: 40 INJECTION, SOLUTION INTRAVENOUS at 09:39

## 2024-03-29 RX ADMIN — CARBOXYMETHYLCELLULOSE SODIUM 2 DROP: 10 GEL OPHTHALMIC at 20:13

## 2024-03-29 RX ADMIN — POTASSIUM CHLORIDE 40 MEQ: 1500 TABLET, EXTENDED RELEASE ORAL at 07:56

## 2024-03-29 RX ADMIN — SODIUM CHLORIDE, PRESERVATIVE FREE 10 ML: 5 INJECTION INTRAVENOUS at 20:40

## 2024-03-29 RX ADMIN — Medication 1 TABLET: at 07:56

## 2024-03-29 RX ADMIN — POTASSIUM CHLORIDE 40 MEQ: 1500 TABLET, EXTENDED RELEASE ORAL at 13:17

## 2024-03-29 RX ADMIN — SODIUM CHLORIDE, PRESERVATIVE FREE 10 ML: 5 INJECTION INTRAVENOUS at 07:57

## 2024-03-29 RX ADMIN — ENOXAPARIN SODIUM 40 MG: 100 INJECTION SUBCUTANEOUS at 07:56

## 2024-03-29 NOTE — DISCHARGE INSTR - DIET
Good nutrition is important when healing from an illness, injury, or surgery.  Follow any nutrition recommendations given to you during your hospital stay.   If you were given an oral nutrition supplement while in the hospital, continue to take this supplement at home.  You can take it with meals, in-between meals, and/or before bedtime. These supplements can be purchased at most local grocery stores, pharmacies, and chain super-stores.   If you have any questions about your diet or nutrition, call the hospital and ask for the dietitian.    Due to malnutrition diagnosis, after discharge, RD recommends patient to drink high calorie, high protein oral nutrition supplements of greater than 200 calories per serving with at least or greater than 10 gm protein per serving, at least 2-3 times per day, to promote increased po intakes and weight gain. Coupons and High Calorie, High Protein Nutrition Therapy handouts provided.     Suggestions to follow at home to improve appetite:   Aim for small, frequent eating sessions. (I.e. 5-8 times per day of smaller portions)   Schedule eating times (I.e. every 2-4 hours would have a snack)   Enhance the eating environment (I.e. Eating with family and friends, watching favorite movie, or listening to favorite music with meal)   Identify problem smells taste, textures, and temperatures  Choose foods that are easy to chew and swallow   Avoid drinking fluids during eating session. Save drinks and sips between meals.     There is no need to apply all of these strategies at once.     Many patients benefit from adding 1 or 2 suggestions at a time to see how they affect their ability to eat, and then making an informed decision on how to proceed.     Please read Nutrition handout below:     High Calorie, High Protein Nutrition Therapy from Nutrition Care Manual     A high-calorie, high-protein diet has been recommended to you. Your registered dietitian nutritionist (RDN) may have recommended

## 2024-03-30 ENCOUNTER — APPOINTMENT (OUTPATIENT)
Dept: GENERAL RADIOLOGY | Age: 79
DRG: 640 | End: 2024-03-30
Payer: COMMERCIAL

## 2024-03-30 LAB
ANION GAP SERPL CALCULATED.3IONS-SCNC: 8 MMOL/L (ref 7–16)
BUN SERPL-MCNC: 10 MG/DL (ref 6–23)
CALCIUM SERPL-MCNC: 8.3 MG/DL (ref 8.3–10.6)
CHLORIDE BLD-SCNC: 101 MMOL/L (ref 99–110)
CHLORIDE URINE RANDOM: 128 MMOL/L (ref 43–210)
CO2: 28 MMOL/L (ref 21–32)
CREAT SERPL-MCNC: 0.6 MG/DL (ref 0.9–1.3)
GFR SERPL CREATININE-BSD FRML MDRD: >90 ML/MIN/1.73M2
GLUCOSE SERPL-MCNC: 86 MG/DL (ref 70–99)
MAGNESIUM: 2.2 MG/DL (ref 1.8–2.4)
PHOSPHORUS: 2.8 MG/DL (ref 2.5–4.9)
POTASSIUM SERPL-SCNC: 4.5 MMOL/L (ref 3.5–5.1)
POTASSIUM, UR: 11.9 MMOL/L (ref 22–119)
SODIUM BLD-SCNC: 137 MMOL/L (ref 135–145)
SODIUM URINE: 117 MMOL/L (ref 35–167)
TTG IGA SER IA-ACNC: <1.02 FLU (ref 0–4.99)

## 2024-03-30 PROCEDURE — 97530 THERAPEUTIC ACTIVITIES: CPT

## 2024-03-30 PROCEDURE — 36415 COLL VENOUS BLD VENIPUNCTURE: CPT

## 2024-03-30 PROCEDURE — 2580000003 HC RX 258: Performed by: STUDENT IN AN ORGANIZED HEALTH CARE EDUCATION/TRAINING PROGRAM

## 2024-03-30 PROCEDURE — 80048 BASIC METABOLIC PNL TOTAL CA: CPT

## 2024-03-30 PROCEDURE — 6360000002 HC RX W HCPCS: Performed by: STUDENT IN AN ORGANIZED HEALTH CARE EDUCATION/TRAINING PROGRAM

## 2024-03-30 PROCEDURE — 84300 ASSAY OF URINE SODIUM: CPT

## 2024-03-30 PROCEDURE — 97112 NEUROMUSCULAR REEDUCATION: CPT

## 2024-03-30 PROCEDURE — 94761 N-INVAS EAR/PLS OXIMETRY MLT: CPT

## 2024-03-30 PROCEDURE — 83735 ASSAY OF MAGNESIUM: CPT

## 2024-03-30 PROCEDURE — 82436 ASSAY OF URINE CHLORIDE: CPT

## 2024-03-30 PROCEDURE — 6370000000 HC RX 637 (ALT 250 FOR IP): Performed by: STUDENT IN AN ORGANIZED HEALTH CARE EDUCATION/TRAINING PROGRAM

## 2024-03-30 PROCEDURE — 2060000000 HC ICU INTERMEDIATE R&B

## 2024-03-30 PROCEDURE — 6370000000 HC RX 637 (ALT 250 FOR IP): Performed by: INTERNAL MEDICINE

## 2024-03-30 PROCEDURE — 74018 RADEX ABDOMEN 1 VIEW: CPT

## 2024-03-30 PROCEDURE — 84133 ASSAY OF URINE POTASSIUM: CPT

## 2024-03-30 PROCEDURE — 84100 ASSAY OF PHOSPHORUS: CPT

## 2024-03-30 RX ORDER — POTASSIUM CHLORIDE 20 MEQ/1
40 TABLET, EXTENDED RELEASE ORAL 2 TIMES DAILY WITH MEALS
Status: DISCONTINUED | OUTPATIENT
Start: 2024-03-30 | End: 2024-03-30

## 2024-03-30 RX ORDER — MINERAL OIL 100 G/100G
1 OIL RECTAL ONCE
Status: DISCONTINUED | OUTPATIENT
Start: 2024-03-30 | End: 2024-04-02

## 2024-03-30 RX ADMIN — FOLIC ACID 1 MG: 1 TABLET ORAL at 09:30

## 2024-03-30 RX ADMIN — POTASSIUM BICARBONATE 40 MEQ: 782 TABLET, EFFERVESCENT ORAL at 20:06

## 2024-03-30 RX ADMIN — Medication 1 TABLET: at 09:30

## 2024-03-30 RX ADMIN — Medication 100 MG: at 09:30

## 2024-03-30 RX ADMIN — SPIRONOLACTONE 12.5 MG: 25 TABLET ORAL at 17:49

## 2024-03-30 RX ADMIN — LOPERAMIDE HYDROCHLORIDE 2 MG: 2 CAPSULE ORAL at 22:29

## 2024-03-30 RX ADMIN — SODIUM CHLORIDE, PRESERVATIVE FREE 10 ML: 5 INJECTION INTRAVENOUS at 20:08

## 2024-03-30 RX ADMIN — POTASSIUM BICARBONATE 40 MEQ: 782 TABLET, EFFERVESCENT ORAL at 02:15

## 2024-03-30 RX ADMIN — CARBOXYMETHYLCELLULOSE SODIUM 2 DROP: 10 GEL OPHTHALMIC at 20:05

## 2024-03-30 RX ADMIN — SODIUM CHLORIDE, PRESERVATIVE FREE 10 ML: 5 INJECTION INTRAVENOUS at 09:31

## 2024-03-30 RX ADMIN — SPIRONOLACTONE 12.5 MG: 25 TABLET ORAL at 20:07

## 2024-03-30 RX ADMIN — SPIRONOLACTONE 12.5 MG: 25 TABLET ORAL at 09:30

## 2024-03-30 RX ADMIN — CARBOXYMETHYLCELLULOSE SODIUM 2 DROP: 10 GEL OPHTHALMIC at 09:31

## 2024-03-30 RX ADMIN — ENOXAPARIN SODIUM 40 MG: 100 INJECTION SUBCUTANEOUS at 09:30

## 2024-03-30 NOTE — CARE COORDINATION
Need updated OT notes for facility to start precert for SNF. WB placed yesterday. OT not signed on. WB placed this morning.

## 2024-03-31 ENCOUNTER — APPOINTMENT (OUTPATIENT)
Dept: GENERAL RADIOLOGY | Age: 79
DRG: 640 | End: 2024-03-31
Payer: COMMERCIAL

## 2024-03-31 LAB
ANION GAP SERPL CALCULATED.3IONS-SCNC: 9 MMOL/L (ref 7–16)
BUN SERPL-MCNC: 15 MG/DL (ref 6–23)
CALCIUM SERPL-MCNC: 8.2 MG/DL (ref 8.3–10.6)
CHLORIDE BLD-SCNC: 97 MMOL/L (ref 99–110)
CO2: 29 MMOL/L (ref 21–32)
CREAT SERPL-MCNC: 0.6 MG/DL (ref 0.9–1.3)
GFR SERPL CREATININE-BSD FRML MDRD: >90 ML/MIN/1.73M2
GLUCOSE SERPL-MCNC: 99 MG/DL (ref 70–99)
MAGNESIUM: 1.9 MG/DL (ref 1.8–2.4)
PHOSPHORUS: 2.8 MG/DL (ref 2.5–4.9)
POTASSIUM SERPL-SCNC: 3.7 MMOL/L (ref 3.5–5.1)
SODIUM BLD-SCNC: 135 MMOL/L (ref 135–145)

## 2024-03-31 PROCEDURE — 2580000003 HC RX 258: Performed by: STUDENT IN AN ORGANIZED HEALTH CARE EDUCATION/TRAINING PROGRAM

## 2024-03-31 PROCEDURE — 80048 BASIC METABOLIC PNL TOTAL CA: CPT

## 2024-03-31 PROCEDURE — 6370000000 HC RX 637 (ALT 250 FOR IP): Performed by: STUDENT IN AN ORGANIZED HEALTH CARE EDUCATION/TRAINING PROGRAM

## 2024-03-31 PROCEDURE — 6370000000 HC RX 637 (ALT 250 FOR IP): Performed by: INTERNAL MEDICINE

## 2024-03-31 PROCEDURE — 2060000000 HC ICU INTERMEDIATE R&B

## 2024-03-31 PROCEDURE — 84100 ASSAY OF PHOSPHORUS: CPT

## 2024-03-31 PROCEDURE — 6360000002 HC RX W HCPCS: Performed by: STUDENT IN AN ORGANIZED HEALTH CARE EDUCATION/TRAINING PROGRAM

## 2024-03-31 PROCEDURE — 83735 ASSAY OF MAGNESIUM: CPT

## 2024-03-31 PROCEDURE — 74018 RADEX ABDOMEN 1 VIEW: CPT

## 2024-03-31 PROCEDURE — 94761 N-INVAS EAR/PLS OXIMETRY MLT: CPT

## 2024-03-31 PROCEDURE — 36415 COLL VENOUS BLD VENIPUNCTURE: CPT

## 2024-03-31 RX ADMIN — POTASSIUM BICARBONATE 40 MEQ: 782 TABLET, EFFERVESCENT ORAL at 11:34

## 2024-03-31 RX ADMIN — Medication 100 MG: at 11:34

## 2024-03-31 RX ADMIN — CARBOXYMETHYLCELLULOSE SODIUM 2 DROP: 10 GEL OPHTHALMIC at 20:44

## 2024-03-31 RX ADMIN — Medication 1 TABLET: at 11:34

## 2024-03-31 RX ADMIN — CARBOXYMETHYLCELLULOSE SODIUM 2 DROP: 10 GEL OPHTHALMIC at 11:36

## 2024-03-31 RX ADMIN — SODIUM CHLORIDE, PRESERVATIVE FREE 10 ML: 5 INJECTION INTRAVENOUS at 20:45

## 2024-03-31 RX ADMIN — POTASSIUM BICARBONATE 40 MEQ: 782 TABLET, EFFERVESCENT ORAL at 20:44

## 2024-03-31 RX ADMIN — SODIUM CHLORIDE, PRESERVATIVE FREE 10 ML: 5 INJECTION INTRAVENOUS at 11:35

## 2024-03-31 RX ADMIN — SPIRONOLACTONE 12.5 MG: 25 TABLET ORAL at 20:44

## 2024-03-31 RX ADMIN — SPIRONOLACTONE 12.5 MG: 25 TABLET ORAL at 15:31

## 2024-03-31 RX ADMIN — ENOXAPARIN SODIUM 40 MG: 100 INJECTION SUBCUTANEOUS at 11:35

## 2024-03-31 RX ADMIN — SPIRONOLACTONE 12.5 MG: 25 TABLET ORAL at 11:42

## 2024-03-31 RX ADMIN — FOLIC ACID 1 MG: 1 TABLET ORAL at 11:34

## 2024-04-01 ENCOUNTER — APPOINTMENT (OUTPATIENT)
Dept: GENERAL RADIOLOGY | Age: 79
DRG: 640 | End: 2024-04-01
Payer: COMMERCIAL

## 2024-04-01 LAB
ALBUMIN SERPL-MCNC: 2.9 GM/DL (ref 3.4–5)
ALP BLD-CCNC: 75 IU/L (ref 40–128)
ALT SERPL-CCNC: 9 U/L (ref 10–40)
ANION GAP SERPL CALCULATED.3IONS-SCNC: 8 MMOL/L (ref 7–16)
AST SERPL-CCNC: 11 IU/L (ref 15–37)
BASOPHILS ABSOLUTE: 0.1 K/CU MM
BASOPHILS RELATIVE PERCENT: 0.8 % (ref 0–1)
BILIRUB SERPL-MCNC: 0.4 MG/DL (ref 0–1)
BUN SERPL-MCNC: 13 MG/DL (ref 6–23)
CALCIUM SERPL-MCNC: 8.4 MG/DL (ref 8.3–10.6)
CHLORIDE BLD-SCNC: 96 MMOL/L (ref 99–110)
CO2: 32 MMOL/L (ref 21–32)
CREAT SERPL-MCNC: 0.7 MG/DL (ref 0.9–1.3)
DIFFERENTIAL TYPE: ABNORMAL
EOSINOPHILS ABSOLUTE: 0.1 K/CU MM
EOSINOPHILS RELATIVE PERCENT: 1.3 % (ref 0–3)
GFR SERPL CREATININE-BSD FRML MDRD: >90 ML/MIN/1.73M2
GLUCOSE SERPL-MCNC: 133 MG/DL (ref 70–99)
HCT VFR BLD CALC: 32.8 % (ref 42–52)
HEMOGLOBIN: 10.5 GM/DL (ref 13.5–18)
IMMATURE NEUTROPHIL %: 0.3 % (ref 0–0.43)
LACTATE: 1.5 MMOL/L (ref 0.5–1.9)
LYMPHOCYTES ABSOLUTE: 1 K/CU MM
LYMPHOCYTES RELATIVE PERCENT: 11.2 % (ref 24–44)
MAGNESIUM: 1.8 MG/DL (ref 1.8–2.4)
MCH RBC QN AUTO: 31.1 PG (ref 27–31)
MCHC RBC AUTO-ENTMCNC: 32 % (ref 32–36)
MCV RBC AUTO: 97 FL (ref 78–100)
MONOCYTES ABSOLUTE: 0.7 K/CU MM
MONOCYTES RELATIVE PERCENT: 8.1 % (ref 0–4)
NUCLEATED RBC %: 0 %
PDW BLD-RTO: 13.7 % (ref 11.7–14.9)
PHOSPHORUS: 2.4 MG/DL (ref 2.5–4.9)
PLATELET # BLD: 348 K/CU MM (ref 140–440)
PMV BLD AUTO: 9.9 FL (ref 7.5–11.1)
POTASSIUM SERPL-SCNC: 4.4 MMOL/L (ref 3.5–5.1)
RBC # BLD: 3.38 M/CU MM (ref 4.6–6.2)
SEGMENTED NEUTROPHILS ABSOLUTE COUNT: 7.1 K/CU MM
SEGMENTED NEUTROPHILS RELATIVE PERCENT: 78.3 % (ref 36–66)
SODIUM BLD-SCNC: 136 MMOL/L (ref 135–145)
TOTAL IMMATURE NEUTOROPHIL: 0.03 K/CU MM
TOTAL NUCLEATED RBC: 0 K/CU MM
TOTAL PROTEIN: 5.3 GM/DL (ref 6.4–8.2)
WBC # BLD: 9 K/CU MM (ref 4–10.5)

## 2024-04-01 PROCEDURE — 2580000003 HC RX 258: Performed by: STUDENT IN AN ORGANIZED HEALTH CARE EDUCATION/TRAINING PROGRAM

## 2024-04-01 PROCEDURE — 97535 SELF CARE MNGMENT TRAINING: CPT

## 2024-04-01 PROCEDURE — 6370000000 HC RX 637 (ALT 250 FOR IP): Performed by: STUDENT IN AN ORGANIZED HEALTH CARE EDUCATION/TRAINING PROGRAM

## 2024-04-01 PROCEDURE — 2060000000 HC ICU INTERMEDIATE R&B

## 2024-04-01 PROCEDURE — 74018 RADEX ABDOMEN 1 VIEW: CPT

## 2024-04-01 PROCEDURE — 94761 N-INVAS EAR/PLS OXIMETRY MLT: CPT

## 2024-04-01 PROCEDURE — 97112 NEUROMUSCULAR REEDUCATION: CPT

## 2024-04-01 PROCEDURE — 36415 COLL VENOUS BLD VENIPUNCTURE: CPT

## 2024-04-01 PROCEDURE — 97530 THERAPEUTIC ACTIVITIES: CPT

## 2024-04-01 PROCEDURE — 83735 ASSAY OF MAGNESIUM: CPT

## 2024-04-01 PROCEDURE — 84100 ASSAY OF PHOSPHORUS: CPT

## 2024-04-01 PROCEDURE — 80053 COMPREHEN METABOLIC PANEL: CPT

## 2024-04-01 PROCEDURE — 85025 COMPLETE CBC W/AUTO DIFF WBC: CPT

## 2024-04-01 PROCEDURE — 83605 ASSAY OF LACTIC ACID: CPT

## 2024-04-01 RX ORDER — SODIUM CHLORIDE 9 MG/ML
INJECTION, SOLUTION INTRAVENOUS CONTINUOUS
Status: DISPENSED | OUTPATIENT
Start: 2024-04-01 | End: 2024-04-01

## 2024-04-01 RX ADMIN — FOLIC ACID 1 MG: 1 TABLET ORAL at 08:30

## 2024-04-01 RX ADMIN — POTASSIUM BICARBONATE 40 MEQ: 782 TABLET, EFFERVESCENT ORAL at 08:30

## 2024-04-01 RX ADMIN — Medication 1 TABLET: at 08:30

## 2024-04-01 RX ADMIN — SODIUM CHLORIDE, PRESERVATIVE FREE 10 ML: 5 INJECTION INTRAVENOUS at 20:28

## 2024-04-01 RX ADMIN — POTASSIUM BICARBONATE 40 MEQ: 782 TABLET, EFFERVESCENT ORAL at 20:27

## 2024-04-01 RX ADMIN — Medication 100 MG: at 08:30

## 2024-04-01 RX ADMIN — SODIUM CHLORIDE, PRESERVATIVE FREE 10 ML: 5 INJECTION INTRAVENOUS at 08:27

## 2024-04-01 RX ADMIN — SODIUM CHLORIDE: 9 INJECTION, SOLUTION INTRAVENOUS at 09:18

## 2024-04-01 RX ADMIN — CARBOXYMETHYLCELLULOSE SODIUM 2 DROP: 10 GEL OPHTHALMIC at 20:28

## 2024-04-01 RX ADMIN — CARBOXYMETHYLCELLULOSE SODIUM 2 DROP: 10 GEL OPHTHALMIC at 08:30

## 2024-04-01 NOTE — CARE COORDINATION
Plan remains back to LTC [facility]. Bo will transition pt to SNF once he is back. Will require updated PT/OT notes within 48 hours of discharge. Discussed in IDR. Few more days till medically ready to discharge.

## 2024-04-02 LAB
ALBUMIN SERPL-MCNC: 2.9 GM/DL (ref 3.4–5)
ALP BLD-CCNC: 74 IU/L (ref 40–128)
ALT SERPL-CCNC: 10 U/L (ref 10–40)
ANION GAP SERPL CALCULATED.3IONS-SCNC: 10 MMOL/L (ref 7–16)
AST SERPL-CCNC: 17 IU/L (ref 15–37)
BASOPHILS ABSOLUTE: 0.1 K/CU MM
BASOPHILS RELATIVE PERCENT: 1 % (ref 0–1)
BILIRUB SERPL-MCNC: 0.5 MG/DL (ref 0–1)
BUN SERPL-MCNC: 11 MG/DL (ref 6–23)
CALCIUM SERPL-MCNC: 8.3 MG/DL (ref 8.3–10.6)
CHLORIDE BLD-SCNC: 98 MMOL/L (ref 99–110)
CO2: 26 MMOL/L (ref 21–32)
CREAT SERPL-MCNC: 0.6 MG/DL (ref 0.9–1.3)
DIFFERENTIAL TYPE: ABNORMAL
EOSINOPHILS ABSOLUTE: 0.3 K/CU MM
EOSINOPHILS RELATIVE PERCENT: 2.4 % (ref 0–3)
GFR SERPL CREATININE-BSD FRML MDRD: >90 ML/MIN/1.73M2
GLUCOSE SERPL-MCNC: 96 MG/DL (ref 70–99)
HCT VFR BLD CALC: 34.1 % (ref 42–52)
HEMOGLOBIN: 10.5 GM/DL (ref 13.5–18)
IMMATURE NEUTROPHIL %: 0.5 % (ref 0–0.43)
LYMPHOCYTES ABSOLUTE: 1.6 K/CU MM
LYMPHOCYTES RELATIVE PERCENT: 15.2 % (ref 24–44)
MCH RBC QN AUTO: 31.3 PG (ref 27–31)
MCHC RBC AUTO-ENTMCNC: 30.8 % (ref 32–36)
MCV RBC AUTO: 101.5 FL (ref 78–100)
MONOCYTES ABSOLUTE: 0.9 K/CU MM
MONOCYTES RELATIVE PERCENT: 8.6 % (ref 0–4)
NUCLEATED RBC %: 0 %
PDW BLD-RTO: 13.9 % (ref 11.7–14.9)
PLATELET # BLD: 302 K/CU MM (ref 140–440)
PMV BLD AUTO: 10.1 FL (ref 7.5–11.1)
POTASSIUM SERPL-SCNC: 4.5 MMOL/L (ref 3.5–5.1)
RBC # BLD: 3.36 M/CU MM (ref 4.6–6.2)
SEGMENTED NEUTROPHILS ABSOLUTE COUNT: 7.6 K/CU MM
SEGMENTED NEUTROPHILS RELATIVE PERCENT: 72.3 % (ref 36–66)
SODIUM BLD-SCNC: 134 MMOL/L (ref 135–145)
TOTAL IMMATURE NEUTOROPHIL: 0.05 K/CU MM
TOTAL NUCLEATED RBC: 0 K/CU MM
TOTAL PROTEIN: 5.5 GM/DL (ref 6.4–8.2)
WBC # BLD: 10.5 K/CU MM (ref 4–10.5)

## 2024-04-02 PROCEDURE — 2060000000 HC ICU INTERMEDIATE R&B

## 2024-04-02 PROCEDURE — 80053 COMPREHEN METABOLIC PANEL: CPT

## 2024-04-02 PROCEDURE — 6370000000 HC RX 637 (ALT 250 FOR IP): Performed by: HOSPITALIST

## 2024-04-02 PROCEDURE — 85025 COMPLETE CBC W/AUTO DIFF WBC: CPT

## 2024-04-02 PROCEDURE — 36415 COLL VENOUS BLD VENIPUNCTURE: CPT

## 2024-04-02 PROCEDURE — 6360000002 HC RX W HCPCS: Performed by: STUDENT IN AN ORGANIZED HEALTH CARE EDUCATION/TRAINING PROGRAM

## 2024-04-02 PROCEDURE — 2580000003 HC RX 258: Performed by: STUDENT IN AN ORGANIZED HEALTH CARE EDUCATION/TRAINING PROGRAM

## 2024-04-02 PROCEDURE — 2580000003 HC RX 258: Performed by: HOSPITALIST

## 2024-04-02 PROCEDURE — 6370000000 HC RX 637 (ALT 250 FOR IP): Performed by: STUDENT IN AN ORGANIZED HEALTH CARE EDUCATION/TRAINING PROGRAM

## 2024-04-02 RX ORDER — MIDODRINE HYDROCHLORIDE 5 MG/1
5 TABLET ORAL
Status: DISCONTINUED | OUTPATIENT
Start: 2024-04-02 | End: 2024-04-03

## 2024-04-02 RX ORDER — 0.9 % SODIUM CHLORIDE 0.9 %
1000 INTRAVENOUS SOLUTION INTRAVENOUS ONCE
Status: COMPLETED | OUTPATIENT
Start: 2024-04-02 | End: 2024-04-03

## 2024-04-02 RX ADMIN — CARBOXYMETHYLCELLULOSE SODIUM 2 DROP: 10 GEL OPHTHALMIC at 09:40

## 2024-04-02 RX ADMIN — POTASSIUM BICARBONATE 40 MEQ: 782 TABLET, EFFERVESCENT ORAL at 09:32

## 2024-04-02 RX ADMIN — Medication 100 MG: at 09:31

## 2024-04-02 RX ADMIN — CARBOXYMETHYLCELLULOSE SODIUM 2 DROP: 10 GEL OPHTHALMIC at 20:52

## 2024-04-02 RX ADMIN — MIDODRINE HYDROCHLORIDE 5 MG: 5 TABLET ORAL at 13:50

## 2024-04-02 RX ADMIN — SODIUM CHLORIDE 1000 ML: 9 INJECTION, SOLUTION INTRAVENOUS at 13:54

## 2024-04-02 RX ADMIN — SODIUM CHLORIDE, PRESERVATIVE FREE 10 ML: 5 INJECTION INTRAVENOUS at 20:53

## 2024-04-02 RX ADMIN — FOLIC ACID 1 MG: 1 TABLET ORAL at 09:31

## 2024-04-02 RX ADMIN — ENOXAPARIN SODIUM 40 MG: 100 INJECTION SUBCUTANEOUS at 09:35

## 2024-04-02 RX ADMIN — MIDODRINE HYDROCHLORIDE 5 MG: 5 TABLET ORAL at 17:31

## 2024-04-02 RX ADMIN — Medication 1 TABLET: at 09:31

## 2024-04-02 RX ADMIN — POTASSIUM BICARBONATE 40 MEQ: 782 TABLET, EFFERVESCENT ORAL at 20:52

## 2024-04-02 NOTE — DISCHARGE INSTR - COC
Distance Tunneling (cm) 0 cm 03/29/24 1609   Tunneling Position ___ O'Clock 0 03/29/24 1609   Undermining Starts ___ O'Clock 0 03/29/24 1609   Undermining Ends___ O'Clock 0 03/29/24 1609   Undermining Maxium Distance (cm) 0 03/29/24 1609   Wound Assessment Pink/red 03/29/24 1609   Drainage Amount None (dry) 04/01/24 0813   Drainage Description Serous 03/29/24 1609   Odor None 04/01/24 0400   Leyla-wound Assessment Dry/flaky 03/29/24 1609   Margins Attached edges 03/29/24 1609   Wound Thickness Description not for Pressure Injury Full thickness 03/29/24 1609   Number of days: 11       Incision 02/05/21 Foot Anterior;Left (Active)   Number of days: 1151       Incision 02/09/21 Radial Distal;Left (Active)   Number of days: 1147        Elimination:  Continence:   Bowel: No  Bladder: No  Urinary Catheter: None   Colostomy/Ileostomy/Ileal Conduit: No       Date of Last BM: 04/03/24      Intake/Output Summary (Last 24 hours) at 4/2/2024 0832  Last data filed at 4/2/2024 0510  Gross per 24 hour   Intake 480 ml   Output 800 ml   Net -320 ml     I/O last 3 completed shifts:  In: 720 [P.O.:720]  Out: 1800 [Urine:1800]    Safety Concerns:     At Risk for Falls    Impairments/Disabilities:      None    Patient's personal belongings (please select all that are sent with patient):  None    RN SIGNATURE:  Electronically signed by Glenn Montoya RN on 4/3/24 at 2:04 PM EDT    CASE MANAGEMENT/SOCIAL WORK SECTION    Inpatient Status Date: ***    Readmission Risk Assessment Score:  Readmission Risk              Risk of Unplanned Readmission:  15           Discharging to Facility/ Agency   Name: [facility]  Address:  Phone:[phone]  Fax:[phone]    Dialysis Facility (if applicable)   Name:  Address:  Dialysis Schedule:  Phone:  Fax:    / signature: Electronically signed by Leann Guardado RN on 4/3/24 at 1:45 PM EDT    PHYSICIAN SECTION    Prognosis: Fair    Condition at Discharge: Stable    Rehab Potential

## 2024-04-02 NOTE — ADT AUTH CERT
Kenyon Reyna MD            Allergies:  Patient has no known allergies.     Social History:   TOBACCO:   reports that he has quit smoking. His smoking use included cigarettes. He has never used smokeless tobacco.  ETOH:   reports that he does not currently use alcohol.  OCCUPATION:       Family History:   Family History   History reviewed. No pertinent family history.     Physical Exam:    Vitals: BP (!) 106/53   Pulse 93   Temp 97.9 °F (36.6 °C) (Oral)   Resp 15   Ht 1.778 m (5' 10\")   Wt 77.1 kg (170 lb)   SpO2 94%   BMI 24.39 kg/m²   General appearance: in no acute distress, appears stated age  Skin: Skin color, texture, turgor normal. No rashes or lesions  HEENT: normocephalic, atraumatic  Neck: supple, trachea midline  Lungs: clear to auscultation bilaterally, breathing comfortably  Heart:: regular rate and rhythm, S1, S2 normal,  Abdomen: soft, non-tender; bowel sounds normal; no masses,   Extremities: extremities normal, atraumatic, no cyanosis or edema  Neurologic: Mental status: alert, oriented, interactive, following commands  Psychiatric: mood and affect appropriate      CBC:         Recent Labs     03/21/24 1650 03/22/24  0122 03/23/24  0224   WBC 9.5 8.7 8.8   HGB 13.9 12.4* 12.4*    333 331      BMP:             Recent Labs     03/21/24  1650 03/21/24  2047 03/22/24  0122 03/22/24  1043 03/22/24  2246 03/23/24  0224   *  --  138  --   --  143   K 1.7*   < > 2.3*  2.3* 2.4* 2.2* 2.9*   CL 88*  --  95*  --   --  100   CO2 33*  --  32  --   --  29   BUN 25*  --  21  --   --  10   CREATININE 1.2  --  1.1  --   --  0.7*   GLUCOSE 121*  --  110*  --   --  106*    < > = values in this interval not displayed.      Hepatic:         Recent Labs     03/21/24  0724 03/21/24  1650 03/22/24  0122   AST 18 21 17   ALT 10 11 11   BILITOT 1.0 0.9 1.0   ALKPHOS 111 112 98                Electronically signed by Gisella Robles DO on 3/23/2024 at 8:57 AM     ADULT HYPERTENSION AND KIDNEY 
0555(a)  03/25/24 0557(r)     Oral      [place], Marrita A  83 See Alternative 03/25/24 0701(s)  03/25/24 0701(a)  03/25/24 0701(r)     Oral      [place], Marrita A  84 See Alternative 03/25/24 0935(s)  03/25/24 0935(a)  03/25/24 0936(r)     Oral      Staccia, Renu  85 See Alternative 03/25/24 0938(s)  03/25/24 0938(a)  03/25/24 0938(r)     Oral      Staccia, Renu  86 See Alternative 03/26/24 0603(s)  03/26/24 0603(a)  03/26/24 0603(r)     Oral      Anny, Shavonne  87 See Alternative 03/28/24 0428(s)  03/28/24 0428(a)  03/28/24 0428(r)     Oral      [place], Shavonne  88 See Alternative 03/28/24 0517(s)  03/28/24 0517(a)  03/28/24 0517(r)     Oral      Anny, Shavonne  89 See Alternative 03/28/24 0629(s)  03/28/24 0629(a)  03/28/24 0629(r)     Oral      [place], Shavonne  90 See Alternative 03/28/24 0934(s)  03/28/24 0934(a)  03/28/24 0934(r)     Oral      LaiRomario barahona  91 See Alternative 03/28/24 1041(s)  03/28/24 1041(a)  03/28/24 1041(r)     Oral      Romario Osorio  92 See Alternative 03/28/24 1201(s)  03/28/24 1201(a)  03/28/24 1201(r)     Oral      Romario Osorio  93 New Bag 03/22/24 0201(s)  03/22/24 0201(a)  03/22/24 0201(r) 10 mEq 100 mL/hr IntraVENous      WalkerSrinivasa  94 New Bag 03/22/24 0322(s)  03/22/24 0322(a)  03/22/24 0322(r) 10 mEq 100 mL/hr IntraVENous      Walker, Srinivasa  95 New Bag 03/22/24 0436(s)  03/22/24 0436(a)  03/22/24 0436(r) 10 mEq 100 mL/hr IntraVENous      Walker, Srinivasa  96 New Bag 03/22/24 0550(s)  03/22/24 0550(a)  03/22/24 0551(r) 10 mEq 100 mL/hr IntraVENous      Walker, Srinivasa  97 New Bag 03/22/24 0658(s)  03/22/24 0658(a)  03/22/24 0659(r) 10 mEq 100 mL/hr IntraVENous      Walker, Srinivasa  98 New Bag 03/22/24 0836(s)  03/22/24 0836(a)  03/22/24 0837(r) 10 mEq 100 mL/hr IntraVENous      Staccia, Renu  99 New Bag 03/22/24 1939(s)  03/22/24 1939(a)  03/22/24 1939(r) 10 mEq 100 mL/hr IntraVENous      Walker, Srinivasa  100 New Bag 03/23/24 0007(s)  03/23/24

## 2024-04-02 NOTE — CARE COORDINATION
WHEN MEDICALLY READY FOR DISCHARGE-  COMPLETE JANA   RN/PHYSICIAN  COPY JANA - FAX [phone]  ADD JANA TO PACKET  CALL REPORT [phone]  CALL FAMILY   CALL TRANSPORT-  SUPERIOR  [phone]    Leann Guardado RN

## 2024-04-03 VITALS
OXYGEN SATURATION: 95 % | WEIGHT: 143.74 LBS | RESPIRATION RATE: 16 BRPM | TEMPERATURE: 98.5 F | SYSTOLIC BLOOD PRESSURE: 94 MMHG | HEART RATE: 76 BPM | BODY MASS INDEX: 20.58 KG/M2 | DIASTOLIC BLOOD PRESSURE: 60 MMHG | HEIGHT: 70 IN

## 2024-04-03 LAB
ALBUMIN SERPL-MCNC: 3.1 GM/DL (ref 3.4–5)
ALP BLD-CCNC: 80 IU/L (ref 40–128)
ALT SERPL-CCNC: 11 U/L (ref 10–40)
ANION GAP SERPL CALCULATED.3IONS-SCNC: 8 MMOL/L (ref 7–16)
AST SERPL-CCNC: 14 IU/L (ref 15–37)
BASOPHILS ABSOLUTE: 0.2 K/CU MM
BASOPHILS RELATIVE PERCENT: 1.5 % (ref 0–1)
BILIRUB SERPL-MCNC: 0.5 MG/DL (ref 0–1)
BUN SERPL-MCNC: 13 MG/DL (ref 6–23)
CALCIUM SERPL-MCNC: 8.5 MG/DL (ref 8.3–10.6)
CHLORIDE BLD-SCNC: 97 MMOL/L (ref 99–110)
CO2: 29 MMOL/L (ref 21–32)
CREAT SERPL-MCNC: 0.6 MG/DL (ref 0.9–1.3)
DIFFERENTIAL TYPE: ABNORMAL
EOSINOPHILS ABSOLUTE: 0.3 K/CU MM
EOSINOPHILS RELATIVE PERCENT: 3.1 % (ref 0–3)
GFR SERPL CREATININE-BSD FRML MDRD: >90 ML/MIN/1.73M2
GLUCOSE SERPL-MCNC: 95 MG/DL (ref 70–99)
HCT VFR BLD CALC: 34.2 % (ref 42–52)
HEMOGLOBIN: 11 GM/DL (ref 13.5–18)
IMMATURE NEUTROPHIL %: 0.7 % (ref 0–0.43)
LYMPHOCYTES ABSOLUTE: 1.5 K/CU MM
LYMPHOCYTES RELATIVE PERCENT: 14.2 % (ref 24–44)
MCH RBC QN AUTO: 31.5 PG (ref 27–31)
MCHC RBC AUTO-ENTMCNC: 32.2 % (ref 32–36)
MCV RBC AUTO: 98 FL (ref 78–100)
MONOCYTES ABSOLUTE: 1 K/CU MM
MONOCYTES RELATIVE PERCENT: 9.6 % (ref 0–4)
NUCLEATED RBC %: 0 %
PDW BLD-RTO: 13.6 % (ref 11.7–14.9)
PLATELET # BLD: 368 K/CU MM (ref 140–440)
PMV BLD AUTO: 9.6 FL (ref 7.5–11.1)
POTASSIUM SERPL-SCNC: 4.3 MMOL/L (ref 3.5–5.1)
RBC # BLD: 3.49 M/CU MM (ref 4.6–6.2)
SEGMENTED NEUTROPHILS ABSOLUTE COUNT: 7.6 K/CU MM
SEGMENTED NEUTROPHILS RELATIVE PERCENT: 70.9 % (ref 36–66)
SODIUM BLD-SCNC: 134 MMOL/L (ref 135–145)
TOTAL IMMATURE NEUTOROPHIL: 0.07 K/CU MM
TOTAL NUCLEATED RBC: 0 K/CU MM
TOTAL PROTEIN: 5.7 GM/DL (ref 6.4–8.2)
WBC # BLD: 10.7 K/CU MM (ref 4–10.5)

## 2024-04-03 PROCEDURE — 36415 COLL VENOUS BLD VENIPUNCTURE: CPT

## 2024-04-03 PROCEDURE — 6370000000 HC RX 637 (ALT 250 FOR IP): Performed by: STUDENT IN AN ORGANIZED HEALTH CARE EDUCATION/TRAINING PROGRAM

## 2024-04-03 PROCEDURE — 6360000002 HC RX W HCPCS: Performed by: STUDENT IN AN ORGANIZED HEALTH CARE EDUCATION/TRAINING PROGRAM

## 2024-04-03 PROCEDURE — 80053 COMPREHEN METABOLIC PANEL: CPT

## 2024-04-03 PROCEDURE — 85025 COMPLETE CBC W/AUTO DIFF WBC: CPT

## 2024-04-03 PROCEDURE — 6370000000 HC RX 637 (ALT 250 FOR IP): Performed by: HOSPITALIST

## 2024-04-03 PROCEDURE — 94761 N-INVAS EAR/PLS OXIMETRY MLT: CPT

## 2024-04-03 PROCEDURE — 2580000003 HC RX 258: Performed by: STUDENT IN AN ORGANIZED HEALTH CARE EDUCATION/TRAINING PROGRAM

## 2024-04-03 RX ORDER — CARBOXYMETHYLCELLULOSE SODIUM 10 MG/ML
2 GEL OPHTHALMIC 2 TIMES DAILY
Refills: 0 | DISCHARGE
Start: 2024-04-03

## 2024-04-03 RX ORDER — MIDODRINE HYDROCHLORIDE 5 MG/1
10 TABLET ORAL
Status: DISCONTINUED | OUTPATIENT
Start: 2024-04-03 | End: 2024-04-03 | Stop reason: HOSPADM

## 2024-04-03 RX ORDER — MIDODRINE HYDROCHLORIDE 10 MG/1
10 TABLET ORAL
Qty: 90 TABLET | Refills: 0 | DISCHARGE
Start: 2024-04-03

## 2024-04-03 RX ORDER — LANOLIN ALCOHOL/MO/W.PET/CERES
400 CREAM (GRAM) TOPICAL DAILY
Qty: 30 TABLET | DISCHARGE
Start: 2024-04-03

## 2024-04-03 RX ADMIN — Medication 100 MG: at 10:21

## 2024-04-03 RX ADMIN — Medication 1 TABLET: at 10:21

## 2024-04-03 RX ADMIN — FOLIC ACID 1 MG: 1 TABLET ORAL at 09:01

## 2024-04-03 RX ADMIN — MIDODRINE HYDROCHLORIDE 10 MG: 5 TABLET ORAL at 13:11

## 2024-04-03 RX ADMIN — ENOXAPARIN SODIUM 40 MG: 100 INJECTION SUBCUTANEOUS at 09:10

## 2024-04-03 RX ADMIN — CARBOXYMETHYLCELLULOSE SODIUM 2 DROP: 10 GEL OPHTHALMIC at 10:21

## 2024-04-03 RX ADMIN — SODIUM CHLORIDE, PRESERVATIVE FREE 10 ML: 5 INJECTION INTRAVENOUS at 10:20

## 2024-04-03 RX ADMIN — MIDODRINE HYDROCHLORIDE 5 MG: 5 TABLET ORAL at 10:21

## 2024-04-03 NOTE — PLAN OF CARE
Problem: Discharge Planning  Goal: Discharge to home or other facility with appropriate resources  3/22/2024 0840 by Renu Kramer RN  Outcome: Progressing  3/22/2024 0644 by Srinivasa Walker, RN  Outcome: Progressing     Problem: Safety - Adult  Goal: Free from fall injury  3/22/2024 0840 by Renu Kramer RN  Outcome: Progressing  3/22/2024 0644 by Srinivasa Walker, RN  Outcome: Progressing     Problem: Skin/Tissue Integrity  Goal: Absence of new skin breakdown  Description: 1.  Monitor for areas of redness and/or skin breakdown  2.  Assess vascular access sites hourly  3.  Every 4-6 hours minimum:  Change oxygen saturation probe site  4.  Every 4-6 hours:  If on nasal continuous positive airway pressure, respiratory therapy assess nares and determine need for appliance change or resting period.  3/22/2024 0840 by Renu Kramer RN  Outcome: Progressing  3/22/2024 0644 by Srinivasa Walker, RN  Outcome: Progressing     
  Problem: Discharge Planning  Goal: Discharge to home or other facility with appropriate resources  3/30/2024 1209 by Jania Tellez, RN  Outcome: Progressing  Flowsheets (Taken 3/30/2024 0800)  Discharge to home or other facility with appropriate resources:   Identify barriers to discharge with patient and caregiver   Arrange for needed discharge resources and transportation as appropriate   Identify discharge learning needs (meds, wound care, etc)   Refer to discharge planning if patient needs post-hospital services based on physician order or complex needs related to functional status, cognitive ability or social support system  3/30/2024 0323 by Srinivasa Walker, RN  Outcome: Progressing     Problem: Safety - Adult  Goal: Free from fall injury  3/30/2024 1209 by Jania Tellez, RN  Outcome: Progressing  3/30/2024 0323 by Srinivasa Walker, RN  Outcome: Progressing     Problem: Skin/Tissue Integrity  Goal: Absence of new skin breakdown  Description: 1.  Monitor for areas of redness and/or skin breakdown  2.  Assess vascular access sites hourly  3.  Every 4-6 hours minimum:  Change oxygen saturation probe site  4.  Every 4-6 hours:  If on nasal continuous positive airway pressure, respiratory therapy assess nares and determine need for appliance change or resting period.  3/30/2024 1209 by Jania Tellez, RN  Outcome: Progressing  3/30/2024 0323 by Srinivasa Walker, RN  Outcome: Progressing     Problem: Neurosensory - Adult  Goal: Achieves stable or improved neurological status  Outcome: Progressing  Flowsheets (Taken 3/30/2024 0800)  Achieves stable or improved neurological status:   Assess for and report changes in neurological status   Initiate measures to prevent increased intracranial pressure   Maintain blood pressure and fluid volume within ordered parameters to optimize cerebral perfusion and minimize risk of hemorrhage   Monitor temperature, glucose, and sodium. Initiate appropriate interventions 
  Problem: Discharge Planning  Goal: Discharge to home or other facility with appropriate resources  4/3/2024 1419 by Oneida Claudio RN  Outcome: Completed  4/3/2024 0621 by Alina Fishman RN  Outcome: Progressing     Problem: Safety - Adult  Goal: Free from fall injury  4/3/2024 1419 by Oneida Claudio RN  Outcome: Completed  4/3/2024 0621 by Alina Fishman RN  Outcome: Progressing     Problem: Skin/Tissue Integrity  Goal: Absence of new skin breakdown  Description: 1.  Monitor for areas of redness and/or skin breakdown  2.  Assess vascular access sites hourly  3.  Every 4-6 hours minimum:  Change oxygen saturation probe site  4.  Every 4-6 hours:  If on nasal continuous positive airway pressure, respiratory therapy assess nares and determine need for appliance change or resting period.  4/3/2024 1419 by Oneida Claudio RN  Outcome: Completed  4/3/2024 0621 by Alina Fishman RN  Outcome: Progressing     Problem: Neurosensory - Adult  Goal: Achieves stable or improved neurological status  4/3/2024 1419 by Oneida Claudio RN  Outcome: Completed  4/3/2024 0621 by Alina Fishman RN  Outcome: Progressing  Goal: Achieves maximal functionality and self care  4/3/2024 1419 by Oneida Claudio RN  Outcome: Completed  4/3/2024 0621 by Alina Fishman RN  Outcome: Progressing     Problem: Respiratory - Adult  Goal: Achieves optimal ventilation and oxygenation  4/3/2024 1419 by Oneida Claudio RN  Outcome: Completed  4/3/2024 0621 by Alina Fishman RN  Outcome: Progressing     Problem: Cardiovascular - Adult  Goal: Maintains optimal cardiac output and hemodynamic stability  4/3/2024 1419 by Oneida Claudio RN  Outcome: Completed  4/3/2024 0621 by Alina Fishman RN  Outcome: Progressing  Goal: Absence of cardiac dysrhythmias or at baseline  4/3/2024 1419 by Oneida Claudio RN  Outcome: Completed  4/3/2024 0621 by Alina Fishman RN  Outcome: Progressing     Problem: Skin/Tissue Integrity - Adult  Goal: 
  Problem: Discharge Planning  Goal: Discharge to home or other facility with appropriate resources  Outcome: Progressing     Problem: Safety - Adult  Goal: Free from fall injury  3/26/2024 0824 by Renu Kramer RN  Outcome: Progressing  3/26/2024 0507 by Shavonne Mccormick RN  Outcome: Progressing     Problem: Skin/Tissue Integrity  Goal: Absence of new skin breakdown  Description: 1.  Monitor for areas of redness and/or skin breakdown  2.  Assess vascular access sites hourly  3.  Every 4-6 hours minimum:  Change oxygen saturation probe site  4.  Every 4-6 hours:  If on nasal continuous positive airway pressure, respiratory therapy assess nares and determine need for appliance change or resting period.  3/26/2024 0824 by Renu Kramer RN  Outcome: Progressing  3/26/2024 0507 by Shavonne Mccormick RN  Outcome: Progressing     Problem: Neurosensory - Adult  Goal: Achieves stable or improved neurological status  3/26/2024 0824 by Renu Kramer RN  Outcome: Progressing  3/26/2024 0507 by Shavonne Mccormick RN  Outcome: Progressing  Goal: Achieves maximal functionality and self care  Outcome: Progressing     Problem: Respiratory - Adult  Goal: Achieves optimal ventilation and oxygenation  3/26/2024 0824 by Renu Kramer RN  Outcome: Progressing  3/26/2024 0507 by Shavonne Mccormick RN  Outcome: Progressing     Problem: Cardiovascular - Adult  Goal: Maintains optimal cardiac output and hemodynamic stability  Outcome: Progressing  Goal: Absence of cardiac dysrhythmias or at baseline  Outcome: Progressing     Problem: Skin/Tissue Integrity - Adult  Goal: Skin integrity remains intact  Outcome: Progressing  Goal: Incisions, wounds, or drain sites healing without S/S of infection  Outcome: Progressing  Goal: Oral mucous membranes remain intact  Outcome: Progressing     Problem: Musculoskeletal - Adult  Goal: Return mobility to safest level of function  Outcome: Progressing  Goal: Return ADL status to a safe level of 
  Problem: Discharge Planning  Goal: Discharge to home or other facility with appropriate resources  Outcome: Progressing     Problem: Safety - Adult  Goal: Free from fall injury  Outcome: Progressing     Problem: Skin/Tissue Integrity  Goal: Absence of new skin breakdown  Description: 1.  Monitor for areas of redness and/or skin breakdown  2.  Assess vascular access sites hourly  3.  Every 4-6 hours minimum:  Change oxygen saturation probe site  4.  Every 4-6 hours:  If on nasal continuous positive airway pressure, respiratory therapy assess nares and determine need for appliance change or resting period.  Outcome: Progressing     
  Problem: Discharge Planning  Goal: Discharge to home or other facility with appropriate resources  Outcome: Progressing     Problem: Safety - Adult  Goal: Free from fall injury  Outcome: Progressing     Problem: Skin/Tissue Integrity  Goal: Absence of new skin breakdown  Description: 1.  Monitor for areas of redness and/or skin breakdown  2.  Assess vascular access sites hourly  3.  Every 4-6 hours minimum:  Change oxygen saturation probe site  4.  Every 4-6 hours:  If on nasal continuous positive airway pressure, respiratory therapy assess nares and determine need for appliance change or resting period.  Outcome: Progressing     Problem: Neurosensory - Adult  Goal: Achieves stable or improved neurological status  Outcome: Progressing     
  Problem: Discharge Planning  Goal: Discharge to home or other facility with appropriate resources  Outcome: Progressing     Problem: Safety - Adult  Goal: Free from fall injury  Outcome: Progressing     Problem: Skin/Tissue Integrity  Goal: Absence of new skin breakdown  Description: 1.  Monitor for areas of redness and/or skin breakdown  2.  Assess vascular access sites hourly  3.  Every 4-6 hours minimum:  Change oxygen saturation probe site  4.  Every 4-6 hours:  If on nasal continuous positive airway pressure, respiratory therapy assess nares and determine need for appliance change or resting period.  Outcome: Progressing     Problem: Neurosensory - Adult  Goal: Achieves stable or improved neurological status  Outcome: Progressing  Goal: Achieves maximal functionality and self care  Outcome: Progressing     Problem: Respiratory - Adult  Goal: Achieves optimal ventilation and oxygenation  Outcome: Progressing     Problem: Cardiovascular - Adult  Goal: Maintains optimal cardiac output and hemodynamic stability  Outcome: Progressing  Goal: Absence of cardiac dysrhythmias or at baseline  Outcome: Progressing     Problem: Skin/Tissue Integrity - Adult  Goal: Skin integrity remains intact  Outcome: Progressing  Goal: Incisions, wounds, or drain sites healing without S/S of infection  Outcome: Progressing  Goal: Oral mucous membranes remain intact  Outcome: Progressing     Problem: Musculoskeletal - Adult  Goal: Return mobility to safest level of function  Outcome: Progressing  Goal: Return ADL status to a safe level of function  Outcome: Progressing     Problem: Gastrointestinal - Adult  Goal: Minimal or absence of nausea and vomiting  Outcome: Progressing  Goal: Maintains or returns to baseline bowel function  Outcome: Progressing  Goal: Maintains adequate nutritional intake  Outcome: Progressing     Problem: Genitourinary - Adult  Goal: Absence of urinary retention  Outcome: Progressing     Problem: Infection - 
  Problem: Safety - Adult  Goal: Free from fall injury  3/31/2024 0736 by Nargis Sandoval  Outcome: Progressing  3/31/2024 0016 by Kilo Alan, RN  Outcome: Progressing     Problem: Skin/Tissue Integrity  Goal: Absence of new skin breakdown  Description: 1.  Monitor for areas of redness and/or skin breakdown  2.  Assess vascular access sites hourly  3.  Every 4-6 hours minimum:  Change oxygen saturation probe site  4.  Every 4-6 hours:  If on nasal continuous positive airway pressure, respiratory therapy assess nares and determine need for appliance change or resting period.  3/31/2024 0736 by Nargis Sandoval  Outcome: Progressing  3/31/2024 0016 by Kilo Alan, RN  Outcome: Progressing     
  Problem: Safety - Adult  Goal: Free from fall injury  Outcome: Progressing     Problem: Skin/Tissue Integrity  Goal: Absence of new skin breakdown  Description: 1.  Monitor for areas of redness and/or skin breakdown  2.  Assess vascular access sites hourly  3.  Every 4-6 hours minimum:  Change oxygen saturation probe site  4.  Every 4-6 hours:  If on nasal continuous positive airway pressure, respiratory therapy assess nares and determine need for appliance change or resting period.  Outcome: Progressing     Problem: Neurosensory - Adult  Goal: Achieves stable or improved neurological status  Outcome: Progressing     Problem: Respiratory - Adult  Goal: Achieves optimal ventilation and oxygenation  Outcome: Progressing     
Care plans ongoing      Problem: Metabolic/Fluid and Electrolytes - Adult  Goal: Electrolytes maintained within normal limits  Outcome: Not Progressing     Problem: Anxiety  Goal: Will report anxiety at manageable levels  Description: INTERVENTIONS:  1. Administer medication as ordered  2. Teach and rehearse alternative coping skills  3. Provide emotional support with 1:1 interaction with staff  Outcome: Not Progressing     
Care plans ongoing.      Problem: Metabolic/Fluid and Electrolytes - Adult  Goal: Electrolytes maintained within normal limits  Outcome: Not Progressing     
function  Outcome: Progressing     Problem: Gastrointestinal - Adult  Goal: Minimal or absence of nausea and vomiting  Outcome: Progressing  Goal: Maintains or returns to baseline bowel function  Outcome: Progressing     
contributors to thought disturbance, including medications, impaired vision or hearing, underlying metabolic abnormalities, dehydration, psychiatric diagnoses, and notify attending LIP  2. [place] high risk fall precautions, as indicated  3. Provide frequent short contacts to provide reality reorientation, refocusing and direction  4. Decrease environmental stimuli, including noise as appropriate  5. Monitor and intervene to maintain adequate nutrition, hydration, elimination, sleep and activity  6. If unable to ensure safety without constant attention obtain sitter and review sitter guidelines with assigned personnel  7. Initiate Psychosocial CNS and Spiritual Care consult, as indicated  Outcome: Progressing     Problem: Behavior  Goal: Pt/Family maintain appropriate behavior and adhere to behavioral management agreement, if implemented  Description: INTERVENTIONS:  1. Assess patient/family's coping skills and  non-compliant behavior (including use of illegal substances)  2. Notify security of behavior or suspected illegal substances which indicate the need for search of the family and/or belongings  3. Encourage verbalization of thoughts and concerns in a socially appropriate manner  4. Utilize positive, consistent limit setting strategies supporting safety of patient, staff and others  5. Encourage participation in the decision making process about the behavioral management agreement  6. If a visitor's behavior poses a threat to safety call refer to organization policy.  7. Initiate consult with , Psychosocial CNS, Spiritual Care as appropriate  Outcome: Progressing     Problem: Nutrition Deficit:  Goal: Optimize nutritional status  Outcome: Progressing     
reorientation, refocusing and direction  4. Decrease environmental stimuli, including noise as appropriate  5. Monitor and intervene to maintain adequate nutrition, hydration, elimination, sleep and activity  6. If unable to ensure safety without constant attention obtain sitter and review sitter guidelines with assigned personnel  7. Initiate Psychosocial CNS and Spiritual Care consult, as indicated  Outcome: Progressing     Problem: Behavior  Goal: Pt/Family maintain appropriate behavior and adhere to behavioral management agreement, if implemented  Description: INTERVENTIONS:  1. Assess patient/family's coping skills and  non-compliant behavior (including use of illegal substances)  2. Notify security of behavior or suspected illegal substances which indicate the need for search of the family and/or belongings  3. Encourage verbalization of thoughts and concerns in a socially appropriate manner  4. Utilize positive, consistent limit setting strategies supporting safety of patient, staff and others  5. Encourage participation in the decision making process about the behavioral management agreement  6. If a visitor's behavior poses a threat to safety call refer to organization policy.  7. Initiate consult with , Psychosocial CNS, Spiritual Care as appropriate  Outcome: Progressing     Problem: Nutrition Deficit:  Goal: Optimize nutritional status  Outcome: Progressing     
alleviate retention as needed   Discuss catheterization for long term situations as appropriate     Problem: Infection - Adult  Goal: Absence of infection at discharge  3/31/2024 0016 by Kilo Alan RN  Outcome: Progressing  3/30/2024 1209 by Jania Tellez RN  Outcome: Progressing  Flowsheets (Taken 3/30/2024 0800)  Absence of infection at discharge:   Assess and monitor for signs and symptoms of infection   Monitor lab/diagnostic results   Monitor all insertion sites i.e., indwelling lines, tubes and drains   Monitor endotracheal (as able) and nasal secretions for changes in amount and color   [place] appropriate cooling/warming therapies per order   Administer medications as ordered   Instruct and encourage patient and family to use good hand hygiene technique   Identify and instruct in appropriate isolation precautions for identified infection/condition  Goal: Absence of infection during hospitalization  3/31/2024 0016 by Kilo Alan RN  Outcome: Progressing  3/30/2024 1209 by Jania Tellez RN  Outcome: Progressing  Flowsheets (Taken 3/30/2024 0800)  Absence of infection during hospitalization:   Assess and monitor for signs and symptoms of infection   Monitor lab/diagnostic results   Monitor all insertion sites i.e., indwelling lines, tubes and drains   Monitor endotracheal (as able) and nasal secretions for changes in amount and color   [place] appropriate cooling/warming therapies per order   Administer medications as ordered   Instruct and encourage patient and family to use good hand hygiene technique   Identify and instruct in appropriate isolation precautions for identified infection/condition     Problem: Metabolic/Fluid and Electrolytes - Adult  Goal: Electrolytes maintained within normal limits  3/31/2024 0016 by Kilo Alan RN  Outcome: Progressing  3/30/2024 1209 by Jania Tellez RN  Outcome: Progressing  Flowsheets (Taken 3/30/2024 0800)  Electrolytes maintained within

## 2024-04-03 NOTE — DISCHARGE INSTRUCTIONS
Medications: see computerized discharge medication list  Activity: activity as tolerated  Diet: ADULT ORAL NUTRITION SUPPLEMENT; Breakfast, Dinner; Standard High Calorie/High Protein Oral Supplement  ADULT DIET; Dysphagia - Soft and Bite Sized; Low Fiber; No Concentrated sweets; low artifical sweeteners please, likes only Vanilla or Strawberry ONS flavors  ADULT ORAL NUTRITION SUPPLEMENT; Dinner, Lunch; Frozen Oral Supplement  Wound Care: Wash bilateral lower legs and feet with bath oil every 2 days.   Left medial ankle cleanse with NS, apply skin prep and  Optifoam border change every 2 days and prn.   Apply moisture barrier to perineum BID and prn incontinence.   Disposition: long term care facility  Discharged Condition: Stable

## 2024-04-03 NOTE — PROGRESS NOTES
V2.0    [facility] [place]      Name:  John Paul Burgos /Age/Sex: 1945  (78 y.o. male)   MRN & CSN:  [id number] & [id number] Encounter Date/Time: 3/30/2024 9:28 AM EDT   Location:  -A PCP: No primary care provider on file.     Attending:Dolores Laboy MD       Hospital Day: 10    Assessment and Recommendations   John Paul Burgos is a 78 y.o. male with pmh as below who presents with Hypokalemia    Past Medical History:   Diagnosis Date    Alcohol abuse     Fall in elderly patient     Smoking history     Tobacco abuse      Plan:     Generalized weakness with hypokalemia -Likely from poor oral intake and poor hydration along with severe malnutrition and diarrhea  Per facility, labs showed K of 1.9, on recheck in the ED 1.7, he was given 30mEq in the ED. Mag was 1.9  -Currently on oral potassium supplementation.  On potassium 40 mg 3 times a day.  -Continue to trend and replete as appropriate  -Monitor on tele  -Repleted Mg to r/o resistant hypokalemia as well as hypomagnesemia  -Did have diarrhea, stool PCR is negative, received Imodium.  -Patient would benefit from a colonoscopy to assess for tubular adenomas, patient is refusing colonoscopy  -Preemptively started on oral vancomycin without improvement just discontinued.  -The CT scan showed concerns for constipation with fecal impaction, disimpaction attempt failed as no stool in rectal vault  -Patient continues to experience BM, loose but diarrhea improving  -Repeat KUB to follow up on constipation/fecal impaction concern    Malnutrition  -Dietitian consult ed  -Prealbumin is low, encourage p.o. intake    Prolong QTC -improved  - Avoid agents that can prolong the QTC and reassess post repletion  - Repeat EKG shows QTc of 465     H/O alcohol abuse  - Continue folic acid and thiamine     Wound on RLE  Wrapped with kerlex  - Wound care consulted  - PT/OT consult      Diet ADULT ORAL NUTRITION SUPPLEMENT; Breakfast, Dinner; Standard High 
    V2.0    [facility] [place]      Name:  John Paul Burgos /Age/Sex: 1945  (78 y.o. male)   MRN & CSN:  [id number] & [id number] Encounter Date/Time: 3/27/2024 9:28 AM EDT   Location:  -A PCP: No primary care provider on file.     Attending:Ronit Stewart MD       Hospital Day: 7    Assessment and Recommendations   John Paul Burgos is a 78 y.o. male with pmh as below who presents with Hypokalemia    Past Medical History:   Diagnosis Date    Alcohol abuse     Fall in elderly patient     Smoking history     Tobacco abuse      Plan:     Generalized weakness with hypokalemia  Per facility, labs showed K of 1.9, on recheck in the ED 1.7, he was given 30mEq in the ED. Mag is 1.9  - Oral and IV repletion, 40mEq and 60mEq respectively. Recheck K 1 hr after the 3rd bag and 1hr post completion of repletion  - Continue to trend and replete as appropriate  - Monitor on tele  -Will replete Mag to r/o resistant hypokalemia iso hypomagnesemia  -Likely from poor oral intake and poor hydration along with severe malnutrition  -Does have symptoms of diarrhea.  Will recommend checking stool osmolar gap along with urine pH urine chloride and urine potassium to delineate the differences  -Will give Imodium  -Nephro consult  -On Imodium still having persistent diarrhea as.  GI was consulted stool PCR is negative.  -I believe patient would benefit from a colonoscopy to assess for tubular adenomas, patient is refusing colonoscopy  -Still having 4-5 loose bowel movements every day.  Potassium of 3.4 today.  Diarrhea is still not improving.  Stool slowly becoming formed potassium of 3.1 on 3/26/2024 replete as needed  -Patient is frustrated, wishes to go home.  Will call family to discuss care  Cholestyramine has been stopped as the patient still has frequent diarrhea as patient is frustrated  -Preemptively started on oral vancomycin still no improvement so I will give 1 more day and discontinue.  I will consult infectious 
    V2.0    [facility] Progress Note      Name:  John Paul Burgos /Age/Sex: 1945  (78 y.o. male)   MRN & CSN:  [id number] & [id number] Encounter Date/Time: 3/25/2024 9:28 AM EDT   Location:  -A PCP: No primary care provider on file.     Attending:Ronit Stewart MD       Hospital Day: 5    Assessment and Recommendations   John Paul Burgos is a 78 y.o. male with pmh as below who presents with Hypokalemia    Past Medical History:   Diagnosis Date    Alcohol abuse     Fall in elderly patient     Smoking history     Tobacco abuse      Plan:     Generalized weakness with hypokalemia  Per facility, labs showed K of 1.9, on recheck in the ED 1.7, he was given 30mEq in the ED. Mag is 1.9  - Oral and IV repletion, 40mEq and 60mEq respectively. Recheck K 1 hr after the 3rd bag and 1hr post completion of repletion  - Continue to trend and replete as appropriate  - Monitor on tele  -Will replete Mag to r/o resistant hypokalemia iso hypomagnesemia  -Likely from poor oral intake and poor hydration along with severe malnutrition  -Does have symptoms of diarrhea.  Will recommend checking stool osmolar gap along with urine pH urine chloride and urine potassium to delineate the differences  -Will give Imodium  -Nephro consult  -On Imodium still having persistent diarrhea as.  GI to be consulted.  GI PCR stool panel ordered  -I believe patient would benefit from a colonoscopy to assess for tubular adenomas need to be  -Stool slowly becoming formed potassium of 2.8 on 3/25/2024 replete as needed    #Malnutrition  -Nutrition consult   -Prealbumin of really low levels advance diet as tolerated    Prolong QTC  - Avoid agents that can prolong the QTC and reassess post repletion  - Repeat EKG shows QTc of 465     H/O alcohol abuse  - Continue folic acid and thiamine     Wound on RLE  Wrapped with kerlex  - Wound care consult  - PT/OT consult    Diet ADULT ORAL NUTRITION SUPPLEMENT; Breakfast; Diabetic Oral Supplement  ADULT DIET; 
    V2.0    [facility] [place] Note      Name:  John Paul Burgos /Age/Sex: 1945  (78 y.o. male)   MRN & CSN:  [id number] & [id number] Encounter Date/Time: 3/29/2024 9:28 AM EDT   Location:  -A PCP: No primary care provider on file.     Attending:Dolores Laboy MD       Hospital Day: 9    Assessment and Recommendations   John Paul Bugros is a 78 y.o. male with pmh as below who presents with Hypokalemia    Past Medical History:   Diagnosis Date    Alcohol abuse     Fall in elderly patient     Smoking history     Tobacco abuse      Plan:     Generalized weakness with hypokalemia -Likely from poor oral intake and poor hydration along with severe malnutrition and diarrhea  Per facility, labs showed K of 1.9, on recheck in the ED 1.7, he was given 30mEq in the ED. Mag was 1.9  -Currently on oral potassium supplementation.  On potassium 40 mg 3 times a day.  -Continue to trend and replete as appropriate  -Monitor on tele  -Repleted Mg to r/o resistant hypokalemia as well as hypomagnesemia  -Did have diarrhea, stool PCR is negative, received Imodium.  -Patient would benefit from a colonoscopy to assess for tubular adenomas, patient is refusing colonoscopy  -Preemptively started on oral vancomycin without improvement just discontinued.  -The CT scan showed concerns for constipation with fecal impaction, disimpaction attempt failed as no stool in rectal vault  -Patient continues to experience BM, no diarrhea at this time    Malnutrition  -Dietitian consult ed  -Prealbumin is low, encourage p.o. intake    Prolong QTC -improved  - Avoid agents that can prolong the QTC and reassess post repletion  - Repeat EKG shows QTc of 465     H/O alcohol abuse  - Continue folic acid and thiamine     Wound on RLE  Wrapped with kerlex  - Wound care consulted  - PT/OT consult      Diet ADULT ORAL NUTRITION SUPPLEMENT; Breakfast, Dinner; Standard High Calorie/High Protein Oral Supplement  ADULT DIET; Dysphagia - Soft and Bite 
    V2.0    [facility] [place]      Name:  John Paul Burgos /Age/Sex: 1945  (78 y.o. male)   MRN & CSN:  [id number] & [id number] Encounter Date/Time: 3/31/2024 9:28 AM EDT   Location:  -A PCP: No primary care provider on file.     Attending:Dolores Laboy MD       Hospital Day: 11    Assessment and Recommendations   John Paul Burgos is a 78 y.o. male with pmh as below who presents with Hypokalemia    Past Medical History:   Diagnosis Date    Alcohol abuse     Fall in elderly patient     Smoking history     Tobacco abuse      Plan:     Generalized weakness with hypokalemia -Likely from poor oral intake and poor hydration along with severe malnutrition and diarrhea  Per facility, labs showed K of 1.9, on recheck in the ED 1.7, he was given 30mEq in the ED. Mag was 1.9  -Currently on oral potassium supplementation.  On potassium 40 mg 3 times a day.  -Continue to trend and replete as appropriate  -Monitor on tele  -Repleted Mg to r/o resistant hypokalemia as well as hypomagnesemia  -Did have diarrhea, stool PCR is negative, received Imodium.  -Patient would benefit from a colonoscopy to assess for tubular adenomas, patient is refusing colonoscopy  -Preemptively started on oral vancomycin without improvement just discontinued.  -The CT scan showed concerns for constipation with fecal impaction, disimpaction attempt failed as no stool in rectal vault  -Repeat KUB with distended sigmoid colon which has progressed slightly from CT  -Did receive enema with significant BM  -Repeat KUB to follow up on constipation/fecal impaction concern    Malnutrition  -Dietitian consult ed  -Prealbumin is low, encourage p.o. intake    Prolong QTC -improved  - Avoid agents that can prolong the QTC and reassess post repletion  - Repeat EKG shows QTc of 465     H/O alcohol abuse  - Continue folic acid and thiamine     Wound on RLE  Wrapped with kerlex  - Wound care consulted  - PT/OT consult      Diet ADULT ORAL NUTRITION 
    V2.0    [facility] [place]      Name:  John Paul Burgos /Age/Sex: 1945  (78 y.o. male)   MRN & CSN:  [id number] & [id number] Encounter Date/Time: 3/26/2024 9:28 AM EDT   Location:  -A PCP: No primary care provider on file.     Attending:Ronit Stewart MD       Hospital Day: 6    Assessment and Recommendations   John Paul Burgos is a 78 y.o. male with pmh as below who presents with Hypokalemia    Past Medical History:   Diagnosis Date    Alcohol abuse     Fall in elderly patient     Smoking history     Tobacco abuse      Plan:     Generalized weakness with hypokalemia  Per facility, labs showed K of 1.9, on recheck in the ED 1.7, he was given 30mEq in the ED. Mag is 1.9  - Oral and IV repletion, 40mEq and 60mEq respectively. Recheck K 1 hr after the 3rd bag and 1hr post completion of repletion  - Continue to trend and replete as appropriate  - Monitor on tele  -Will replete Mag to r/o resistant hypokalemia iso hypomagnesemia  -Likely from poor oral intake and poor hydration along with severe malnutrition  -Does have symptoms of diarrhea.  Will recommend checking stool osmolar gap along with urine pH urine chloride and urine potassium to delineate the differences  -Will give Imodium  -Nephro consult  -On Imodium still having persistent diarrhea as.  GI to be consulted.  GI PCR stool panel ordered  -I believe patient would benefit from a colonoscopy to assess for tubular adenomas need to be  -Stool slowly becoming formed potassium of 3.1 on 3/26/2024 replete as needed  -Patient is frustrated, wishes to go home.  Will call family to discuss care  - Diarrhea is still there.  Will discuss with the nurse and order C. difficile testing  -We will discontinue cholestyramine, medications reviewed continue home medications to patient satisfaction    #Malnutrition  -Nutrition consult   -Prealbumin of really low levels advance diet as tolerated    Prolong QTC  - Avoid agents that can prolong the QTC and reassess 
    V2.0  [facility] [place] [facility]      Name:  John Paul Burgos /Age/Sex: 1945  (78 y.o. male)   MRN & CSN:  [id number] & [id number] Encounter Date/Time: 2024 8:20 AM EDT    Location:  -A PCP: No primary care provider on file.       Hospital Day: 13    Assessment and Plan:   John Paul Burgos is a 78 y.o. male who presents with hypokalemia    Generalized weakness with hypokalemia   -Likely from poor oral intake and poor hydration along with severe malnutrition and diarrhea    Constipation with fecal impaction and subsequent colonic distention  Probable spurious diarrhea  Per facility, labs showed K of 1.9, on recheck in the ED 1.7, he was given 30mEq in the ED. Mag was 1.9  -Currently on oral potassium supplementation.    -Monitor on tele  -Repleted Mg to r/o resistant hypokalemia as well as hypomagnesemia  -Did have diarrhea, stool PCR is negative, received Imodium initially however CT scan with concern for constipation with fecal impaction.   -GI believes pt to be constipated with spurious diarrhea and advised digital fecal disimpaction and enemas to clean out rectum/colon  -Disimpaction attempted by RN but failed as no stool in rectal vault.  -Did receive one enema with significant BM, patient refused subsequent enemas and also refused repeat attempt at fecal disimpaction despite counseling on risks including perforation  -Repeat KUB with distended sigmoid colon which has progressed slightly from CT. Pt continues to tolerate good PO intake and have moderate to large volume BM   -Patient would benefit from a colonoscopy to assess for tubular adenomas, patient is refusing colonoscopy.  -Repeat KUB to follow up on constipation/fecal impaction concern  -Discussed next steps with pt who is not interested in any further management for this including enemas/disimpaction/colonoscopy/surgical input despite counseling. Did discuss with daughter.  - KUB : Persistent gas-filled loops of mildly 
   03/25/24 1023   Encounter Summary   Encounter Overview/Reason  Attempted Encounter   Service Provided For: Patient not available  (RN requested  return at a later time.  will see pt. on  future rounds.  Blessings given.)   Referral/Consult From: Rounding   Support System Children   Last Encounter  03/25/24  (Upon entering room RN caring for patient.  She asked  to return at a later time. Blessings given.)   Complexity of Encounter Low   Begin Time 1000   End Time  1010   Total Time Calculated 10 min   Spiritual/Emotional needs   Type Spiritual Support   Assessment/Intervention/Outcome   Assessment Calm   Intervention Sustaining Presence/Ministry of presence;Prayer (assurance of)/[place]   Outcome Coping   Plan and Referrals   Plan/Referrals Continue Support (comment)  (Support as needed)       
  Physical Therapy Treatment Note  Name: John Paul Burgos MRN: [id number] :   1945   Date:  2024   Admission Date: 3/21/2024 Room:  -A   Restrictions/Precautions:fall risk, gen prec   Communication with other providers:  Pt okay to see for therapy per RN, CoTx with HAGERGLENNA Son for pt safety and tolerance.   Subjective:  Patient states:  \"I don't want to get out of bed\"   Pain:   Location, Type, Intensity (0/10 to 10/10):  Does not c/o pn.   Objective:    Observation:  Pt supine in bed upon therapy arrival.   Objective Measures:  Tele, stable  Treatment, including education/measures:    Pt requires significant encouragement and education for participation and throughout session.     Therapeutic Activity Training:   Therapeutic activity training was instructed today.  Cues were given for safety, sequence, UE/LE placement, awareness, and balance.    Activities performed today included bed mobility training, sup-sit, sit-stand, SPT.    Mobility:  Sup <> sit: Max A x 2 sup > sit, cues for LE advancement. Pt sat EOB for 20 min this date for sitting balance, core strength, upright tolerance, bedding change, and ADLs with HAGER.   Scooting: Mod A at hips, Mod A at trunk to maintain balance.   STS: NT this date pt refusing.   SPT: NT    Neuro Re-ed:  Seated balance: Mod A at trunk with B knee block dt retro lean increasing risk for sliding off EOB, pt demos retro-lean and L lateral lean PTA provided max cues for correction. Pt Is able to correct but with inconsistent response to cues.   Standing balance: NT    Safety:   Pt returned safely to bed with bed alarm activated, call light in reach, all needs met.   Assessment / Impression:    Pt tolerated EOB activities, declines transfer to recliner this date. Pt continues to demo significant balance deficits.   Patient's tolerance of treatment:  Fair-   Adverse Reaction:fatigue   Significant change in status and impact:  none  Barriers to improvement:  Pain, 
  Physical Therapy Treatment Note  Name: John Paul Burgos MRN: [id number] :   1945   Date:  3/25/2024   Admission Date: 3/21/2024 Room:  -A   Restrictions/Precautions:  General Precautions, Fall Risk  Communication with other providers:  EDEL Truong, Mara, PCA    Subjective:  Patient states:  \"This is the most absurd thing to try and move with all these wires on!\"  Pain:   Location, Type, Intensity (0/10 to 10/10):  did not state numerical value    Objective:    Observation:  Patient approached presenting supine in bed and agreeable to participate with encouragement.     Treatment, including education/measures:  Facilitation of bed mobility in preparation for OOB related tasks. Patient performed supine>sitting at EOB with modA for trunk management. Once at EOB, patient presenting with poor seated balance, requiring cueing for UE assistance and to engage in trunk. Patient performed SPT from EOB>bedside chair with maxA x1 for lifting and pivoting and additional personal for hip management. Patient presenting with poor trunk control while in bedside chair, requiring cueing for patient to scoot and reposition self in chair. Patient performed additional SPT from bedside chair>bed with maxA. Patient presenting with significant retropulsion, requiring cueing to anterior weight shifting. Patient performed x3 bouts of rolling L<>R to teresa brief and change bedding. Patient was left in bed with PCA present and all needs met.     Assessment / Impression:    Patient presenting with self limiting behaviors. Patient continues to fixate on IV and tele wires throughout session. Patient requiring extensive assist at this time and would benefit from SNF stay to promote greater mobility.     Patient's tolerance of treatment:  fair   Adverse Reaction: none  Significant change in status and impact:  none  Barriers to improvement:  Cognition, Self-limiting behaviors    Plan for Next Session:    Continue to address goals as 
  Physical Therapy Treatment Note  Name: John Paul Burgos MRN: [id number] :   1945   Date:  3/29/2024   Admission Date: 3/21/2024 Room:  -A   Restrictions/Precautions: General Precautions, Fall Risk   Communication with other providers:  Pt okay to see for therapy per RN   Subjective:  Patient states:  Agreeable to session.   Pain:   Location, Type, Intensity (0/10 to 10/10):  Denies   Objective:    Observation:  Pt supine in bed upon PTA arrival.   Objective Measures:  Tele BP sitting up in recliner 78/52 (62), BP sitting up following in recliner second assessment 89/49 (62), PTA elevated pts legs, encouraged fluid intake, and instructed pt in Aps BP increasing to 103/59 (74).   Treatment, including education/measures:    Therapeutic Activity Training:   Therapeutic activity training was instructed today.  Cues were given for safety, sequence, UE/LE placement, awareness, and balance.    Activities performed today included bed mobility training, sup-sit, sit-stand, SPT.    Mobility:  Sup > sit: Sup > sit Mod-Max A to EOB, pt returned to supine Max A, back to EOB Mod A at trunk and LEs. First sup > sit, pt sat EOB for ~8 min for adjustment, upright tolerance, core activation, and balance.   Scooting: Mod A to scoot to EOB x 2 occasions.   STS: Attempt to stand at EOB in prep for SPT Max A, pt presents with knee buckling and retro-lean resulting in unsafe return to sit too close to EOB, max attempt to cue pt and assist in backward scoot and unable to do so. Pt returned to supine for safety.   SPT: squat-pivot transfer Max A with cues for anterior weight shift at trunk to increase participation and safety.     Balance/neuro re-ed:   Seated balance: fair-, pt requires initial Min A at trunk but progressing to CGA at EOB. Pt requires B UE support to maintain balance and max cues for anterior weight shift at trunk to avoid slipping off EOB.  Standing balance: poor, requires Max A to maintain partial 
-Stopping aldactone now K level is staying normal and he is getting hypotensive  -K has been wnl for many days now, continue supplementation  -Please no thiazides or loop diuretics  -I will sign off so call me back if needed    Thank you        -Severe hypokalemia of 2 on admission//UCl 93mmol/L///No renal K wasting per urine electrolytes//no signs of primary hyperaldos//TSH wnl, AM cortisol wnl  -Metabolic alkalosis on admission  -Diarrhea at the nursing home per pt  -Normotension/hypotension  -Hypophosphatemia      
4 Eyes Skin Assessment     NAME:  John Paul Burgos  YOB: 1945  MEDICAL RECORD NUMBER:  [id number]    The patient is being assessed for  Admission    I agree that at least one RN has performed a thorough Head to Toe Skin Assessment on the patient. ALL assessment sites listed below have been assessed.      Areas assessed by both nurses:    Head, Face, Ears, Shoulders, Back, Chest, Arms, Elbows, Hands, Sacrum. Buttock, Coccyx, Ischium, Legs. Feet and Heels, and Under Medical Devices         Does the Patient have a Wound? Yes wound(s) were present on assessment. LDA wound assessment was Initiated and completed by RN       Pee Prevention initiated by RN: Yes  Wound Care Orders initiated by RN: Yes    Pressure Injury (Stage 3,4, Unstageable, DTI, NWPT, and Complex wounds) if present, place Wound referral order by RN under : Yes    New Ostomies, if present place, Ostomy referral order under : No     Nurse 1 eSignature: Electronically signed by Srinivasa Walker RN on 3/22/24 at 1:37 AM EDT    **SHARE this note so that the co-signing nurse can place an eSignature**    Nurse 2 eSignature: Electronically signed by Ynes Akers RN on 3/22/24 at 3:18 AM EDT   
C-Diff negative isolation removed.  
CT ABD REPORT NOTED  D/W DR ELIOT WARNER   PT WILL NEED DIGITAL FECAL DISIMPACTION AND ENEMAS TO CLEAN OUT THE RECTUM / COLON  IMP--SPURIOUS DIARRHEA ?  
Comprehensive Nutrition Assessment    Type and Reason for Visit:  Reassess (Low BMI for age)    Nutrition Recommendations/Plan:   Trial Soft and Bite Sized Diet/No Concentrated Sweets/Low Fiber, continue assist feed as able   Encourage proper hydration/fluids intake, recommend reduced sugary and artifical sugar-sweetened beverages   Offer Diabetic oral nutrition supplement BID with wound healing supplement BID      Malnutrition Assessment:  Malnutrition Status:  At risk for malnutrition (Comment) (03/22/24 1420)    Context:  Acute Illness   (NFPE inappropriate at visit, pt upset)     Nutrition Assessment:    Pt sitting up for lunch, consumed 51-75% of meal, fed by [facility] student. States good appetite. Having difficulty chewing certain foods s/t missing teeth. Pt upset over hospital stay, wants to leave. Ongoing diarrhea over the past month. C/o 2 episodes of diarrhea this AM, discussed tips to reduced diarrhea, pt drinks sugary and artifically sweetened beverages may attribute to ongoing diarrhea. Endorsed better hydration. Pt with wounds, will continue supplements, pt dislikes any chocolate flavor. Pt with poor nutrition prior to stay, unable to retreive adequate diet recall as pt was upset. High nutrition risk.    Nutrition Related Findings:    K 3.1, Mg and P WNL. Hx etoh abuse. Meds: folvite, thiamine, MV Wound Type: Venous Stasis, Pressure Injury, Multiple (Multiple Traumatic Wounds)       Current Nutrition Intake & Therapies:    Average Meal Intake: 51-75%, %  Average Supplements Intake:  (only wants vanilla)  ADULT ORAL NUTRITION SUPPLEMENT; Breakfast; Diabetic Oral Supplement  ADULT DIET; Regular  ADULT ORAL NUTRITION SUPPLEMENT; Breakfast, Lunch; Standard High Calorie/High Protein Oral Supplement  ADULT ORAL NUTRITION SUPPLEMENT; Dinner; Frozen Oral Supplement  ADULT ORAL NUTRITION SUPPLEMENT; Breakfast, Dinner; Wound Healing Oral Supplement    Anthropometric Measures:  Height: 177.8 cm (5' 10\")  Ideal 
Nephrology Progress Note        [address] [facility], [address]  [place] [address]  Phone: [phone]  Office Hours: 8:30AM - 4:30PM  Monday - Friday        3/24/2024 7:46 AM  Subjective:   Admit Date: 3/21/2024  PCP: No primary care provider on file.  Interval History:   Resting comfortably  BP not low     Diet: ADULT ORAL NUTRITION SUPPLEMENT; Breakfast; Diabetic Oral Supplement  ADULT DIET; Regular  ADULT ORAL NUTRITION SUPPLEMENT; Breakfast, Lunch; Standard High Calorie/High Protein Oral Supplement  ADULT ORAL NUTRITION SUPPLEMENT; Dinner; Frozen Oral Supplement  ADULT ORAL NUTRITION SUPPLEMENT; Breakfast, Dinner; Wound Healing Oral Supplement      Data:   Scheduled Meds:   potassium alternative oral replacement  20 mEq Oral TID    magnesium oxide  400 mg Oral Daily    spironolactone  12.5 mg Oral TID    magnesium sulfate  1,000 mg IntraVENous Once    sodium chloride flush  5-40 mL IntraVENous 2 times per day    enoxaparin  40 mg SubCUTAneous Daily    folic acid  1 mg Oral Daily    therapeutic multivitamin-minerals  1 tablet Oral Daily    thiamine  100 mg Oral Daily     Continuous Infusions:   sodium chloride       PRN Meds:diphenoxylate-atropine, sodium chloride flush, sodium chloride, potassium chloride **OR** potassium alternative oral replacement **OR** potassium chloride, magnesium sulfate, ondansetron **OR** ondansetron, polyethylene glycol, acetaminophen **OR** acetaminophen, traMADol  I/O last 3 completed shifts:  In: 1710 [P.O.:1000; I.V.:710]  Out: 750 [Urine:750]  No intake/output data recorded.    Intake/Output Summary (Last 24 hours) at 3/24/2024 0746  Last data filed at 3/23/2024 1732  Gross per 24 hour   Intake 710 ml   Output --   Net 710 ml       CBC:   Recent Labs     03/21/24  1650 03/22/24  0122 03/23/24  0224   WBC 9.5 8.7 8.8   HGB 13.9 12.4* 12.4*    333 331       BMP:    Recent Labs     03/23/24  1424 03/23/24  2045 03/24/24  0553    138 141   K 3.3* 2.7* 3.1* 
Nephrology Progress Note        [address] [facility], [address]  [place] [address]  Phone: [phone]  Office Hours: 8:30AM - 4:30PM  Monday - Friday        3/30/2024 7:59 AM  Subjective:   Admit Date: 3/21/2024  PCP: No primary care provider on file.  Interval History:   AWAKE ALERT  Resting comfortably    Diet: ADULT ORAL NUTRITION SUPPLEMENT; Breakfast, Dinner; Standard High Calorie/High Protein Oral Supplement  ADULT DIET; Dysphagia - Soft and Bite Sized; Low Fiber; No Concentrated sweets; low artifical sweeteners please, likes only Vanilla or Strawberry ONS flavors  ADULT ORAL NUTRITION SUPPLEMENT; Dinner, Lunch; Frozen Oral Supplement      Data:   Scheduled Meds:   spironolactone  12.5 mg Oral TID    carboxymethylcellulose  2 drop Both Eyes BID    potassium chloride  40 mEq Oral TID WC    [Held by provider] magnesium oxide  400 mg Oral Daily    sodium chloride flush  5-40 mL IntraVENous 2 times per day    enoxaparin  40 mg SubCUTAneous Daily    folic acid  1 mg Oral Daily    therapeutic multivitamin-minerals  1 tablet Oral Daily    thiamine  100 mg Oral Daily     Continuous Infusions:   sodium chloride       PRN Meds:loperamide, sodium chloride flush, sodium chloride, potassium chloride **OR** potassium alternative oral replacement **OR** potassium chloride, magnesium sulfate, ondansetron **OR** ondansetron, polyethylene glycol, acetaminophen **OR** acetaminophen, traMADol  I/O last 3 completed shifts:  In: 1080 [P.O.:1070; I.V.:10]  Out: 2250 [Urine:2250]  No intake/output data recorded.    Intake/Output Summary (Last 24 hours) at 3/30/2024 0759  Last data filed at 3/30/2024 0500  Gross per 24 hour   Intake 1070 ml   Output 1250 ml   Net -180 ml       CBC:   Recent Labs     03/29/24  0140   WBC 8.6   HGB 11.1*          BMP:    Recent Labs     03/29/24  0140 03/29/24  1431 03/30/24  0624    132* 137   K 4.2 3.5 4.5   CL 97* 95* 101   CO2 31 30 28   BUN 10 12 10   CREATININE 0.6* 0.6* 
Nephrology Progress Note        [address] [facility], [address]  [place] [address]  Phone: [phone]  Office Hours: 8:30AM - 4:30PM  Monday - Friday        3/31/2024 7:58 AM  Subjective:   Admit Date: 3/21/2024  PCP: No primary care provider on file.  Interval History:     Resting comfortably    Diet: ADULT ORAL NUTRITION SUPPLEMENT; Breakfast, Dinner; Standard High Calorie/High Protein Oral Supplement  ADULT DIET; Dysphagia - Soft and Bite Sized; Low Fiber; No Concentrated sweets; low artifical sweeteners please, likes only Vanilla or Strawberry ONS flavors  ADULT ORAL NUTRITION SUPPLEMENT; Dinner, Lunch; Frozen Oral Supplement      Data:   Scheduled Meds:   potassium alternative oral replacement  40 mEq Oral BID    mineral oil  1 enema Rectal Once    spironolactone  12.5 mg Oral TID    carboxymethylcellulose  2 drop Both Eyes BID    [Held by provider] magnesium oxide  400 mg Oral Daily    sodium chloride flush  5-40 mL IntraVENous 2 times per day    enoxaparin  40 mg SubCUTAneous Daily    folic acid  1 mg Oral Daily    therapeutic multivitamin-minerals  1 tablet Oral Daily    thiamine  100 mg Oral Daily     Continuous Infusions:   sodium chloride       PRN Meds:loperamide, sodium chloride flush, sodium chloride, potassium chloride **OR** potassium alternative oral replacement **OR** potassium chloride, magnesium sulfate, ondansetron **OR** ondansetron, polyethylene glycol, acetaminophen **OR** acetaminophen, traMADol  I/O last 3 completed shifts:  In: 1310 [P.O.:1310]  Out: 1675 [Urine:1675]  No intake/output data recorded.    Intake/Output Summary (Last 24 hours) at 3/31/2024 0758  Last data filed at 3/31/2024 0457  Gross per 24 hour   Intake 960 ml   Output 1075 ml   Net -115 ml       CBC:   Recent Labs     03/29/24  0140   WBC 8.6   HGB 11.1*          BMP:    Recent Labs     03/29/24  1431 03/30/24  0624 03/31/24  0202   * 137 135   K 3.5 4.5 3.7   CL 95* 101 97*   CO2 30 28 29   BUN 
Nephrology Progress Note        [address] [facility], [address]  [place] [address]  Phone: [phone]  Office Hours: 8:30AM - 4:30PM  Monday - Friday        3/26/2024 7:00 AM  Subjective:   Admit Date: 3/21/2024  PCP: No primary care provider on file.  Interval History:   Doing ok    Diet: ADULT ORAL NUTRITION SUPPLEMENT; Breakfast; Diabetic Oral Supplement  ADULT DIET; Regular  ADULT ORAL NUTRITION SUPPLEMENT; Breakfast, Lunch; Standard High Calorie/High Protein Oral Supplement  ADULT ORAL NUTRITION SUPPLEMENT; Dinner; Frozen Oral Supplement  ADULT ORAL NUTRITION SUPPLEMENT; Breakfast, Dinner; Wound Healing Oral Supplement      Data:   Scheduled Meds:   spironolactone  12.5 mg Oral TID    magnesium oxide  400 mg Oral Daily    cholestyramine light  4 g Oral BID    sodium chloride flush  5-40 mL IntraVENous 2 times per day    enoxaparin  40 mg SubCUTAneous Daily    folic acid  1 mg Oral Daily    therapeutic multivitamin-minerals  1 tablet Oral Daily    thiamine  100 mg Oral Daily     Continuous Infusions:   sodium chloride       PRN Meds:diphenoxylate-atropine, sodium chloride flush, sodium chloride, potassium chloride **OR** potassium alternative oral replacement **OR** potassium chloride, magnesium sulfate, ondansetron **OR** ondansetron, polyethylene glycol, acetaminophen **OR** acetaminophen, traMADol  I/O last 3 completed shifts:  In: 1670 [P.O.:1660; I.V.:10]  Out: 700 [Urine:700]  I/O this shift:  In: -   Out: 800 [Urine:800]    Intake/Output Summary (Last 24 hours) at 3/26/2024 0700  Last data filed at 3/26/2024 0100  Gross per 24 hour   Intake 1660 ml   Output 1500 ml   Net 160 ml       CBC:   Recent Labs     03/25/24  0431   WBC 9.8   HGB 11.1*          BMP:    Recent Labs     03/25/24  0956 03/25/24  1957 03/26/24  0429    134* 138   K 3.3* 3.4*  3.5 3.1*   CL 97* 94* 97*   CO2 32 33* 33*   BUN 10 13 12   CREATININE 0.6* 0.7* 0.6*   GLUCOSE 91 108* 94   CALCIUM 8.3 8.4 7.8* 
Nephrology Progress Note        [address] [facility], [address]  [place] [address]  Phone: [phone]  Office Hours: 8:30AM - 4:30PM  Monday - Friday        3/29/2024 7:38 AM  Subjective:   Admit Date: 3/21/2024  PCP: No primary care provider on file.  Interval History:   Resting comfortably    Diet: ADULT ORAL NUTRITION SUPPLEMENT; Dinner; Frozen Oral Supplement  ADULT ORAL NUTRITION SUPPLEMENT; Breakfast, Dinner; Wound Healing Oral Supplement  ADULT ORAL NUTRITION SUPPLEMENT; Breakfast, Dinner; Standard High Calorie/High Protein Oral Supplement  ADULT DIET; Dysphagia - Soft and Bite Sized; Low Fiber; No Concentrated sweets; low artifical sweeteners please, likes only Vanilla or Strawberry ONS flavors      Data:   Scheduled Meds:   potassium chloride  40 mEq Oral TID WC    spironolactone  12.5 mg Oral TID    [Held by provider] magnesium oxide  400 mg Oral Daily    sodium chloride flush  5-40 mL IntraVENous 2 times per day    enoxaparin  40 mg SubCUTAneous Daily    folic acid  1 mg Oral Daily    therapeutic multivitamin-minerals  1 tablet Oral Daily    thiamine  100 mg Oral Daily     Continuous Infusions:   sodium chloride       PRN Meds:loperamide, sodium chloride flush, sodium chloride, potassium chloride **OR** potassium alternative oral replacement **OR** potassium chloride, magnesium sulfate, ondansetron **OR** ondansetron, polyethylene glycol, acetaminophen **OR** acetaminophen, traMADol  I/O last 3 completed shifts:  In: 610 [P.O.:600; I.V.:10]  Out: 3800 [Urine:3800]  No intake/output data recorded.    Intake/Output Summary (Last 24 hours) at 3/29/2024 0738  Last data filed at 3/29/2024 0137  Gross per 24 hour   Intake 610 ml   Output 1700 ml   Net -1090 ml       CBC:   Recent Labs     03/29/24  0140   WBC 8.6   HGB 11.1*          BMP:    Recent Labs     03/27/24  0224 03/28/24  0322 03/29/24  0140    138 135   K 3.4* 3.0* 4.2   CL 99 99 97*   CO2 33* 32 31   BUN 9 8 10   CREATININE 
Occupational Therapy    Occupational Therapy Treatment Note    Name: John Paul Burgos MRN: [id number] :   1945   Date:  3/26/2024   Admission Date: 3/21/2024 Room:  -A     Primary Problem:  Generalized Weakness with Hypokalemia    Restrictions/Precautions:  General Precautions, Fall Risk, Telemetry, Pulse Ox, BP Cuff, Bed/Chair Alarm    Communication with other providers: EDEL Sears     Subjective:  Patient states: \"An hour is too long to be in the chair!\"  Pain: Pt denied pain this date    Objective:    Observation: Pt supine in bed upon OT arrival. Agreeable to OT treatment session   Objective Measures: Stable vitals throughout session on room air    Treatment, including education:  Therapeutic Activity Training:   Therapeutic activity training was instructed today.  Cues were given for safety, sequence, UE/LE placement, awareness, and balance.      Pt received supine in bed upon OT arrival. Pt agreeable to OT treatment session. Pt educated on role of OT, POC, importance of EOB/OOB activity, adverse effects of prolonged bedrest and discharge planning. Pt completed sup-sit SBA with HOB elevated to 40' and increased time and effort to complete. Pt SBA in unsupported sitting EOB. Pt donned BL tennis shoes max A for inserting foot into shoe and tying laces. Pt completed sit-stand min A from bed with mod verbal cues for safe hand placement. Pt CGA in static standing with RW for ~1 minute. Pt completed stand-sit to bed min A for controlled descent. Pt completed stand pivot transfer mod A from bed to chair. Pt educated on the importance of upright positioning, OOB activity, and benefits of sitting upright OOB. Pt left positioned for comfort in chair, alarm in place, and all needs within reach at conclusion of OT treatment session. Pt decline further ADLs this date.    Assessment / Impression:    Patient's tolerance of treatment: Fair  Adverse Reaction: None  Significant change in status and impact: Improved 
Occupational Therapy    Occupational Therapy Treatment Note    Name: John Paul Burgos MRN: [id number] :   1945   Date:  3/30/2024   Admission Date: 3/21/2024 Room:  -A     Primary Problem:  Generalized Weakness with Hypokalemia     Restrictions/Precautions: General Precautions, Fall Risk, Telemetry, Pulse Ox, BP Cuff, Bed/Chair Alarm     Communication with other providers: RN approved session, co tx with PTA for increased safety and mobility.    Subjective:  Patient states:  Pt agreeable to therapy session.   Pain:   Location, Type, Intensity (0/10 to 10/10):  denies pain.     Objective:    Observation: Pt in semi fowlers in bed upon arrival.    Objective Measures:  Pt alert and oriented.     Treatment, including education:  Therapeutic Activity Training:   Therapeutic activity training was instructed today.  Cues were given for safety, sequence, UE/LE placement, awareness, and balance.    Activities performed today included bed mobility training, sup-sit, sit-stand, SPT.    Pt supine in bed upon arrival.  Pt completed supine to sit with MAX A x2 with noted heavy posterior lean. Pt completed sitting edge of bed with set up and MOD to MAX A for sitting balance and educated on sitting balance to lean forward with limited ability to correct. Pt completed edge of bed with MOD A for posture. Pt completed sit to stand with two trials with MAX A x2 with heavy posterior lean. Pt completed SPT from edge of bed with MAX A x2 and seated in chair with MAX A. Pt rested in chair  and completed second sit to stand with MAX A and placed trell. Pt completed self feeding in with set up and SBA to ensure patient can self feed.  Pt needs met and call light in reach chair alarm on.     Assessment / Impression:    Patient's tolerance of treatment: fair- to poor  Adverse Reaction: None  Significant change in status and impact:   Barriers to improvement: cognition and safety      Plan for Next Session:    Continue OT POC 
Occupational Therapy    Occupational Therapy Treatment Note    Name: John Paul Burgos MRN: [id number] :   1945   Date:  2024   Admission Date: 3/21/2024 Room:  -A     Primary Problem:   Generalized Weakness with Hypokalemia     Restrictions/Precautions:          General Precautions, Fall Risk     Communication with other providers: RN approved session, VIVIANA Fisher for safety/tolerance     Subjective:  Patient states:  \"I do not want to sit up in that chair!\"  Pain:   Location, Type, Intensity (0/10 to 10/10):  denies     Objective:    Observation: Pt presented supine in bed.    Objective Measures:  Tele, IV, external catheter     Treatment, including education:  Therapeutic Activity Training/Self-Care:   Therapeutic activity training was instructed today.  Cues were given for safety, sequence, UE/LE placement, awareness, and balance.    Activities performed today included bed mobility training, sup-sit, sitting balance.     Supine to Sit with Max A X2 with cues for sequencing.     Sitting EOB for ~20 min while completing ADLs with pt requiring Mod A for sitting balance throughout all tasks with cues for keeping trunk in midline d/t lateral leaning. ADLs included:    Hygiene:  UB sponge bath with Max A  LB sponge bath with Max A.     Dressing:  Pt required Max A to doff/don gown.     Incontinence care completed later in supine while rolling R/L with Max A including doffing/donning depend with DEP assist and urine hygiene with DEP assist.     Sit to Supine with Max A X2.     Scooting up in bed with Max A X2.     Extensive education on benefits of sitting up in chair and pr declined sitting in recliner, but agreeable to sitting EOB. Patient educated on role of OT , benefits of OT and rationale for therapeutic intervention. Educated on need to be patient advocate, benefit of EOB activity. Pt left supine with all needs met, call light within reach, and bed alarm on.     Assessment / Impression:  
PT KNOWN TO ME CHART REVIEWED AND PT EXAMINED PT CONTINUE TO HAVE LOOSE BM HAD 2 LOOSE BM LAST NIGHT WITH NO BLOOD NO ABD PAIN N/ VOMITING  CT ABD STILL NOT DONE PT AGREEABLE FOR THE PROCEDURE REFUSES COLONOSCOPY NO FEVER CHILLS  VITALS STABLE   LABS NOTED STILL WITH HYPOKALEMIA STOOL STUDIES NEGATIVE  WILL CPM F/U CT ABD AND  PERFORM COLONOSCOPY ONCE PT AGREES D/W PT AND EDEL GUERRERO  
Patient continues with loose stools multiple times a shift. MD made aware at bedside. Verbal order received  to obtain stool sample for C-diff R/O.   
Patient continues with loose stools.   
Physical Therapy      Attempted tx. Pt refuses. Attempted to edu pt on benefit and purpose of PT, pt still refuses.    Electronically signed by:    Ashutosh Rg PTA  3/27/2024, 2:04 PM      
Physical Therapy    Physical Therapy Treatment Note  Name: John Paul Burgos MRN: [id number] :   1945   Date:  3/30/2024   Admission Date: 3/21/2024 Room:  -A   Restrictions/Precautions:          fall risk, gen prec  Communication with other providers:  cotx /c HAGER  Subjective:  Patient states:  agreeable   Pain:   Location, Type, Intensity (0/10 to 10/10):  does not rate    Objective:    Observation:  in bed  Treatment, including education/measures:  Bed mob maxAx2, pt very retropulsive, posterior lean.   Sitting balance EOB /c focus on achieving proper COG, ant wt shift and abd activation to improve control and positioning. Pt improves /c cues and assist after long duration. P+/P balance grade static modA x1/2 to minAx1 needed.   Stand balance x 2 sets /c pt unable to ant wt shift fully, posterior lean and inappropriate steps /c feet that result in worse ALEA, x~30\"  STS maxAx2  Squat pivot maxAx2 and vc for safe techs  Spoke /c nurse regarding pt status  Safety  Patient left safely in the chair, with call light/phone in reach with alarm applied. Gait belt was used for transfers and gait.        Assessment / Impression:    Patient's tolerance of treatment:  good   Adverse Reaction: na  Significant change in status and impact:  na  Barriers to improvement:  weakness and endurance  Plan for Next Session:    Cont balance and transfer training  Time in:  1128  Time out:  1151  Timed treatment minutes:  23  Total treatment time:  23    Previously filed items:  Social/Functional History  Type of Home: Facility  Home Layout: One level  Has the patient had two or more falls in the past year or any fall with injury in the past year?:  (reports having one fall)  ADL Assistance: Needs assistance  Homemaking Assistance: Needs assistance  Ambulation Assistance: Needs assistance  Transfer Assistance: Needs assistance        Short Term Goals  Time Frame for Short Term Goals: 1 week or until discharge  Short Term 
Pt has had 3 loose watery stools during the night.[facility]  
Pt report called to anupam valentin Thornton. Liza HOLLINGSWORTH. JANA has been faxed. RN called family and left vm on machine to make aware of discharge. Pt to [facility] transported with [facility].   
  CL 97* 99 99   CO2 33* 33* 32   BUN 12 9 8   CREATININE 0.6* 0.6* 0.7*   GLUCOSE 94 95 99   CALCIUM 7.8* 8.1* 8.2*         Objective:   Vitals: /60   Pulse 60   Temp 98.1 °F (36.7 °C) (Oral)   Resp 22   Ht 1.778 m (5' 10\")   Wt 66.8 kg (147 lb 4.3 oz)   SpO2 94%   BMI 21.13 kg/m²   General appearance: in no acute distress  HEENT: normocephalic, atraumatic,   Neck: supple, trachea midline  Lungs: breathing comfortably on nc  Heart:: regular rate and rhythm,   Extremities: extremities atraumatic, no cyanosis or edema      MEDICAL DECISION MAKING       -Severe hypokalemia of 2 on admission//UCl 93mmol/L///No renal K wasting per urine electrolytes//no signs of primary hyperaldos  -Metabolic alkalosis on admission  -Diarrhea at the nursing home per pt  -Normotension  -Hypophosphatemia      Suggest:  -K level 3. today  -Pt needs to continue the oral potassium but the IV sliding scale orders need to be followed too.  He requires a 2 -way repletion approach  -Continue other electrolytes repletion  -Continue aldactone  -Obtain tsh, am cortisol, urine electrolytes                Electronically signed by Gisella Robles DO on 3/28/2024 at 7:52 AM    [facility]  MD RALPH JOHN DO  [address] [facility],  [address]  [place] [address]  PHONE: [phone]  FAX: [phone]       
03/24/24  1654 03/25/24  0431    137 140   K 3.1* 2.9* 2.8*    95* 98*   CO2 32 31 31   BUN 7 9 9   CREATININE 0.6* 0.7* 0.6*   GLUCOSE 82 84 81   CALCIUM 7.9* 8.5 8.1*       Objective:   Vitals: BP (!) 115/55   Pulse 60   Temp 98.6 °F (37 °C) (Oral)   Resp 18   Ht 1.778 m (5' 10\")   Wt 66.8 kg (147 lb 4.3 oz)   SpO2 96%   BMI 21.13 kg/m²   General appearance:  in no acute distress  HEENT: normocephalic, atraumatic,   Neck: supple, trachea midline  Lungs:  breathing comfortably   Abdomen: ; non distended,  Extremities: extremities atraumatic, no cyanosis or edema    MEDICAL DECISION MAKING     -Severe hypokalemia of 2 on admission//UCl 93mmol/L///No renal K wasting per urine electrolytes  -Metabolic alkalosis  -Diarrhea at the nursing home per pt  -Normotension  -Hypophosphatemia      Suggest:  -Pt needs to continue the oral potassium but the IV sliding scale orders need to be followed too.  He requires a 2 -way repletion approach  -Continue other electrolytes repletion                    Electronically signed by Gisella Robles DO on 3/25/2024 at 7:59 AM    [facility]  MD RALPH JOHN DO  [address] [facility],  [address]  [place] [id number]  PHONE: [phone]  FAX: [phone]       
increased time   Attention Span Attends with cues to redirect   Safety Judgement Decreased awareness of need for safety; Decreased awareness of need for assistance   Problem Solving Decreased awareness of errors   Insights Decreased awareness of deficits   Initiation Requires cues for some   Sequencing Requires cues for some       Functional Mobility:  Bed mobility:  R/L rolling while supine SBA, supine to/from long sitting in bed w/ CGA - min A.   Sitting balance:  NT - unwilling to complete further activity     Transfers: NT - refused  Standing balance:  NT  Functional Mobility: NT  Toilet/Shower Transfers: NT        Activities of Daily Living (ADLs):  Feeding: set up A  Grooming: min A  UB bathing: mod A  LB bathing: max A  UB dressing: mod A  LB dressing: max A to don socks while long sitting in bed  Toileting: max A    *ADL determined per observation of functional mobility, balance, activity tolerance, cognition, or actual ADL performance.     AM-PAC 6 click short form for inpatient daily activity:   How much help from another person does the patient currently need... Unable  Dep A Lot  Max A A Lot   Mod A A Little  Min A A Little   CGA  SBA None   Mod I  Indep  Sup   1.  Putting on and taking off regular lower body clothing? [] 1    [x] 2   [] 2   [] 3   [] 3   [] 4      2. Bathing (including washing, rinsing, drying)? [] 1   [x] 2   [] 2 [] 3 [] 3 [] 4   3. Toileting, which includes using toilet, bedpan, or urinal? [] 1    [x] 2   [] 2   [] 3   [] 3   [] 4     4. Putting on and taking off regular upper body clothing? [] 1   [] 2   [x] 2   [] 3   [] 3    [] 4      5. Taking care of personal grooming such as brushing teeth? [] 1   [] 2    [] 2 [x] 3    [] 3   [] 4      6. Eating meals?   [] 1   [] 2   [] 2   [] 3   [] 3   [x] 4        Raw Score:  15     [24=0% impaired(CH), 23=1-19%(CI), 20-22=20-39%(CJ), 15-19=40-59%(CK), 10-14=60-79%(CL), 7-9=80-99%(CM), 6=100%(CN)]     Treatment:    Therapeutic Activity 
NCAT  Cardiovascular: RRR  Respiratory: Clear to auscultation   Gastrointestinal: nontender, soft, nondistended  Genitourinary: no suprapubic tenderness  Musculoskeletal: No edema, nontender  Skin: warm, dry, no gross lesions  Neuro: Alert. No gross deficits  Psych: agitated         Medications:   Medications:    midodrine  5 mg Oral TID WC    potassium alternative oral replacement  40 mEq Oral BID    carboxymethylcellulose  2 drop Both Eyes BID    [Held by provider] magnesium oxide  400 mg Oral Daily    sodium chloride flush  5-40 mL IntraVENous 2 times per day    enoxaparin  40 mg SubCUTAneous Daily    folic acid  1 mg Oral Daily    therapeutic multivitamin-minerals  1 tablet Oral Daily    thiamine  100 mg Oral Daily      Infusions:    sodium chloride       PRN Meds: sodium chloride flush, 5-40 mL, PRN  sodium chloride, , PRN  potassium chloride, 40 mEq, PRN   Or  potassium alternative oral replacement, 40 mEq, PRN   Or  potassium chloride, 10 mEq, PRN  magnesium sulfate, 2,000 mg, PRN  ondansetron, 4 mg, Q8H PRN   Or  ondansetron, 4 mg, Q6H PRN  polyethylene glycol, 17 g, Daily PRN  acetaminophen, 650 mg, Q6H PRN   Or  acetaminophen, 650 mg, Q6H PRN  traMADol, 50 mg, Q6H PRN        Labs      Recent Labs     04/01/24 0945 04/02/24 0121 04/03/24 0221   WBC 9.0 10.5 10.7*   HGB 10.5* 10.5* 11.0*   HCT 32.8* 34.1* 34.2*    302 368      Recent Labs     04/01/24 0945 04/02/24 0121 04/03/24 0221    134* 134*   K 4.4 4.5 4.3   CL 96* 98* 97*   CO2 32 26 29   PHOS 2.4*  --   --    BUN 13 11 13   CREATININE 0.7* 0.6* 0.6*     Recent Labs     04/01/24 0945 04/02/24  0121 04/03/24 0221   AST 11* 17 14*   ALT 9* 10 11   BILITOT 0.4 0.5 0.5   ALKPHOS 75 74 80     No results for input(s): \"INR\" in the last 72 hours.  No results for input(s): \"CKTOTAL\", \"CKMB\", \"CKMBINDEX\", \"TROPONINT\" in the last 72 hours.  No results found for: \"LABA1C\"  CALCIUM:  8.5/29 (04/03 0221)  Lab Results   Component Value 
Nutrient Intake, Supplement Intake, IVF Intake, Vitamin/Mineral Intake  Physical Signs/Symptoms Outcomes: Biochemical Data, Meal Time Behavior, Nutrition Focused Physical Findings, Skin, Weight    Discharge Planning:    Continue Oral Nutrition Supplement, Continue current diet     Catina Rosas RD, LD  Contact: [phone]      
dry  Neuro: Alert.  Psych: Mood appropriate.         Medications:   Medications:    potassium chloride  40 mEq Oral TID WC    vancomycin  125 mg Oral 4x Daily    spironolactone  12.5 mg Oral TID    [Held by provider] magnesium oxide  400 mg Oral Daily    sodium chloride flush  5-40 mL IntraVENous 2 times per day    enoxaparin  40 mg SubCUTAneous Daily    folic acid  1 mg Oral Daily    therapeutic multivitamin-minerals  1 tablet Oral Daily    thiamine  100 mg Oral Daily      Infusions:    sodium chloride       PRN Meds: loperamide, 2 mg, 4x Daily PRN  sodium chloride flush, 5-40 mL, PRN  sodium chloride, , PRN  potassium chloride, 40 mEq, PRN   Or  potassium alternative oral replacement, 40 mEq, PRN   Or  potassium chloride, 10 mEq, PRN  magnesium sulfate, 2,000 mg, PRN  ondansetron, 4 mg, Q8H PRN   Or  ondansetron, 4 mg, Q6H PRN  polyethylene glycol, 17 g, Daily PRN  acetaminophen, 650 mg, Q6H PRN   Or  acetaminophen, 650 mg, Q6H PRN  traMADol, 50 mg, Q6H PRN        Labs and Imaging   No results found.    CBC:   No results for input(s): \"WBC\", \"HGB\", \"PLT\" in the last 72 hours.    BMP:    Recent Labs     03/26/24  0429 03/27/24  0224 03/28/24  0322    139 138   K 3.1* 3.4* 3.0*   CL 97* 99 99   CO2 33* 33* 32   BUN 12 9 8   CREATININE 0.6* 0.6* 0.7*   GLUCOSE 94 95 99       Hepatic:   No results for input(s): \"AST\", \"ALT\", \"ALB\", \"BILITOT\", \"ALKPHOS\" in the last 72 hours.    Lipids: No results found for: \"CHOL\", \"HDL\", \"TRIG\"  Hemoglobin A1C: No results found for: \"LABA1C\"  TSH: No results found for: \"TSH\"  Troponin:   Lab Results   Component Value Date/Time    TROPONINT <0.010 02/01/2021 02:20 PM     Lactic Acid: No results for input(s): \"LACTA\" in the last 72 hours.  BNP: No results for input(s): \"PROBNP\" in the last 72 hours.  UA:  Lab Results   Component Value Date/Time    NITRU NEGATIVE 02/01/2021 06:21 PM    COLORU CHIQUITA 02/01/2021 06:21 PM    WBCUA 4 02/01/2021 06:21 PM    RBCUA 6 02/01/2021 06:21 PM    
multivitamin-minerals  1 tablet Oral Daily    thiamine  100 mg Oral Daily    potassium chloride  40 mEq Oral Once      Infusions:    sodium chloride       PRN Meds: sodium chloride flush, 5-40 mL, PRN  sodium chloride, , PRN  potassium chloride, 40 mEq, PRN   Or  potassium alternative oral replacement, 40 mEq, PRN   Or  potassium chloride, 10 mEq, PRN  magnesium sulfate, 2,000 mg, PRN  ondansetron, 4 mg, Q8H PRN   Or  ondansetron, 4 mg, Q6H PRN  polyethylene glycol, 17 g, Daily PRN  acetaminophen, 650 mg, Q6H PRN   Or  acetaminophen, 650 mg, Q6H PRN  traMADol, 50 mg, Q6H PRN        Labs and Imaging   No results found.    CBC:   Recent Labs     03/21/24  0724 03/21/24  1650 03/22/24  0122   WBC 9.6 9.5 8.7   HGB 12.8* 13.9 12.4*    377 333     BMP:    Recent Labs     03/21/24  0724 03/21/24  1650 03/21/24 2047 03/22/24  0122    134*  --  138   K 1.9* 1.7* 2.0* 2.3*  2.3*   CL 92* 88*  --  95*   CO2 32 33*  --  32   BUN 23 25*  --  21   CREATININE 1.0 1.2  --  1.1   GLUCOSE 101* 121*  --  110*     Hepatic:   Recent Labs     03/21/24  0724 03/21/24  1650 03/22/24  0122   AST 18 21 17   ALT 10 11 11   BILITOT 1.0 0.9 1.0   ALKPHOS 111 112 98     Lipids: No results found for: \"CHOL\", \"HDL\", \"TRIG\"  Hemoglobin A1C: No results found for: \"LABA1C\"  TSH: No results found for: \"TSH\"  Troponin:   Lab Results   Component Value Date/Time    TROPONINT <0.010 02/01/2021 02:20 PM     Lactic Acid: No results for input(s): \"LACTA\" in the last 72 hours.  BNP: No results for input(s): \"PROBNP\" in the last 72 hours.  UA:  Lab Results   Component Value Date/Time    NITRU NEGATIVE 02/01/2021 06:21 PM    COLORU CHIQUITA 02/01/2021 06:21 PM    WBCUA 4 02/01/2021 06:21 PM    RBCUA 6 02/01/2021 06:21 PM    MUCUS FEW 02/01/2021 06:21 PM    TRICHOMONAS NONE SEEN 02/01/2021 06:21 PM    YEAST RARE 02/01/2021 06:21 PM    BACTERIA NEGATIVE 02/01/2021 06:21 PM    CLARITYU SLIGHTLY CLOUDY 02/01/2021 06:21 PM    SPECGRAV 1.027 02/01/2021 
of Systems:      Pertinent positives and negatives discussed in HPI    Objective:     Intake/Output Summary (Last 24 hours) at 4/1/2024 1348  Last data filed at 4/1/2024 1318  Gross per 24 hour   Intake 1440 ml   Output 1400 ml   Net 40 ml      Vitals:   Vitals:    04/01/24 0813 04/01/24 0822 04/01/24 1146 04/01/24 1323   BP: (!) 76/54 (!) 87/67 (!) 90/56 104/60   Pulse: 92  94    Resp: 15  20    Temp: 98.1 °F (36.7 °C)  98.6 °F (37 °C)    TempSrc: Oral  Oral    SpO2: 92%  92%    Weight:       Height:             Physical Exam:      General: NAD  Eyes: EOMI  ENT: neck supple  Cardiovascular: Regular rate.  Respiratory: Clear to auscultation  Gastrointestinal: Soft, non tender  Genitourinary: no suprapubic tenderness  Musculoskeletal: No edema  Skin: warm, dry  Neuro: Alert.  Psych: Mood appropriate.     Medications:   Medications:    potassium alternative oral replacement  40 mEq Oral BID    mineral oil  1 enema Rectal Once    spironolactone  12.5 mg Oral TID    carboxymethylcellulose  2 drop Both Eyes BID    [Held by provider] magnesium oxide  400 mg Oral Daily    sodium chloride flush  5-40 mL IntraVENous 2 times per day    enoxaparin  40 mg SubCUTAneous Daily    folic acid  1 mg Oral Daily    therapeutic multivitamin-minerals  1 tablet Oral Daily    thiamine  100 mg Oral Daily      Infusions:    sodium chloride 75 mL/hr at 04/01/24 0918    sodium chloride       PRN Meds: sodium chloride flush, 5-40 mL, PRN  sodium chloride, , PRN  potassium chloride, 40 mEq, PRN   Or  potassium alternative oral replacement, 40 mEq, PRN   Or  potassium chloride, 10 mEq, PRN  magnesium sulfate, 2,000 mg, PRN  ondansetron, 4 mg, Q8H PRN   Or  ondansetron, 4 mg, Q6H PRN  polyethylene glycol, 17 g, Daily PRN  acetaminophen, 650 mg, Q6H PRN   Or  acetaminophen, 650 mg, Q6H PRN  traMADol, 50 mg, Q6H PRN        Labs and Imaging   No results found.    CBC:   Recent Labs     04/01/24  0945   WBC 9.0   HGB 10.5*        BMP:    Recent 
02/01/2021 06:21 PM     Urine Cultures: No results found for: \"LABURIN\"  Blood Cultures: No results found for: \"BC\"  No results found for: \"BLOODCULT2\"  Organism: No results found for: \"ORG\"      Electronically signed by Ronit Stewart MD on 3/23/2024 at 10:28 AM    
06:21 PM    YEAST RARE 02/01/2021 06:21 PM    BACTERIA NEGATIVE 02/01/2021 06:21 PM    CLARITYU SLIGHTLY CLOUDY 02/01/2021 06:21 PM    SPECGRAV 1.027 02/01/2021 06:21 PM    LEUKOCYTESUR NEGATIVE 02/01/2021 06:21 PM    UROBILINOGEN 1 02/01/2021 06:21 PM    BILIRUBINUR NEGATIVE 02/01/2021 06:21 PM    BLOODU LARGE 02/01/2021 06:21 PM    KETUA MODERATE 02/01/2021 06:21 PM     Urine Cultures: No results found for: \"LABURIN\"  Blood Cultures: No results found for: \"BC\"  No results found for: \"BLOODCULT2\"  Organism: No results found for: \"ORG\"      Electronically signed by Ronit Stewart MD on 3/24/2024 at 10:44 AM

## 2024-04-03 NOTE — DISCHARGE SUMMARY
18   Ht 1.778 m (5' 10\")   Wt 65.2 kg (143 lb 11.8 oz)   SpO2 95%   BMI 20.62 kg/m²       Physical Exam:   General: NAD, laying in bed  Eyes: no discharge  HENT: NCAT  Cardiovascular: RRR  Respiratory: Clear to auscultation   Gastrointestinal: nontender, soft, nondistended  Genitourinary: no suprapubic tenderness  Musculoskeletal: No edema, nontender  Skin: warm, dry, no gross lesions  Neuro: Alert. No gross deficits  Psych: agitated         Imaging   XR ABDOMEN (KUB) (SINGLE AP VIEW)    Result Date: 4/1/2024  History: Constipation follow-up. 3 views the abdomen. FINDINGS: 3 supine views the abdomen demonstrate gas-filled dilated loops of large bowel suggesting colonic ileus. No free air. No small bowel distention.     1. Persistent gas-filled loops of mildly dilated colon most consistent with chronic colonic ileus.. Electronically signed by Judd Calles MD    XR ABDOMEN (KUB) (SINGLE AP VIEW)    Result Date: 3/31/2024  EXAM: ABDOMEN X-RAY, 1 VIEW, time INDICATION: Constipation f/u, COMPARISON: 03/30/2024. TECHNIQUE: FINDINGS: Stable gaseous distention of sigmoid colon. Small bowel loops are normal in caliber. No intraperitoneal free air. No suspicious osseous lesion.     Stable gaseous distention of sigmoid colon. Electronically signed by Martinez Vidales MD    XR ABDOMEN (KUB) (SINGLE AP VIEW)    Result Date: 3/30/2024  Single view abdomen HISTORY: Diarrhea FINDINGS: There is distended gas-filled loop of sigmoid colon extending to the upper abdomen. This demonstrates similar appearance to recent CT exam. No free intraperitoneal air. There is gas in nondistended small bowel loops.     1. Distended sigmoid colon there is progressed slightly from most recent CT study of 3/20/2024. Sigmoid volvulus can have similar radiographic appearance on plain film imaging however this was not appreciated on recent CT exam. Correlate clinically. Electronically signed by Mike Vogel MD    CT ABDOMEN PELVIS WO CONTRAST

## 2024-04-03 NOTE — CONSULTS
[address] CENTER [place] [id number]                              CONSULTATION      PATIENT NAME: WILMA THOMAS              : 1945  MED REC NO: [id number]                      ROOM:   ACCOUNT NO: [id number]                       ADMIT DATE: 2024  PROVIDER: Danyel Calderon MD      CHIEF COMPLAINT:  History of chronic diarrhea and hypokalemia.    HISTORY OF PRESENT ILLNESS:  The patient is a 78-year-old white male, a resident of nursing home with past medical history significant for EtOH abuse, tobacco abuse, who was admitted to the hospital on 2024 with chronic diarrhea and generalized weakness.  Upon admission, the patient had a Chem profile drawn, which showed the patient to be severely hypokalemic with a potassium of 1.9.  The patient's LFTs are within normal limits.  CBC shows a WBC count of 8.8, hemoglobin is 12.4, platelet count is 331,000.  The patient's serum potassium has been corrected, but the patient continues to have diarrhea each time he eats.  There is no history of vomiting, hematemesis, melena, or hematochezia.  The patient has not had a recent EGD or a colonoscopy done and is vehemently against any endoscopic procedures as well.  Stool for Clostridium difficile is negative and stool for GI disease panel is in order.  The patient is hemodynamically stable.    REVIEW OF SYSTEMS:  CENTRAL NERVOUS SYSTEM:  The patient denies headache or focal sensorimotor symptoms.  CARDIOVASCULAR SYSTEM:  No history of chest pain or shortness of breath, but the patient does have wounds on his left lower extremity.  GENITOURINARY SYSTEM:  No history of dysuria, pyuria, or hematuria.  MUSCULOSKELETAL SYSTEM:  The patient complains of generalized weakness.  RESPIRATORY SYSTEM:  No history of cough, hemoptysis, fever, or chills.    PAST MEDICAL HISTORY:  Significant for history of EtOH abuse, tobacco abuse, and wounds on the 
 [facility]  Consult Note       John Paul Burgos  AGE: 78 y.o.   GENDER: male  : 1945  TODAY'S DATE:  3/22/2024    Subjective:     Reason for CWOCN Evaluation and Assessment: wound assessment      John Paul Burgos is a 78 y.o. male referred by:   [x] Physician  [] Nursing  [] Other:     Wound Identification:  Wound Type: venous and traumatic  Contributing Factors: edema, chronic pressure, decreased mobility, incontinence of stool, incontinence of urine, and substance abuse        PAST MEDICAL HISTORY        Diagnosis Date    Alcohol abuse     Fall in elderly patient     Smoking history     Tobacco abuse        PAST SURGICAL HISTORY    Past Surgical History:   Procedure Laterality Date    FOOT DEBRIDEMENT Left 2021    FOOT DEBRIDEMENT INCISION AND DRAINAGE performed by Lionel Leggett II, MD at [facility] OR    WRIST FRACTURE SURGERY Left 2021    LEFT WRIST IRRIGATION AND DEBRIDEMENT, EXAM UNDER FLUOROSCOPY performed by Miguel A Palma MD at [facility] OR       FAMILY HISTORY    History reviewed. No pertinent family history.    SOCIAL HISTORY    Social History     Tobacco Use    Smoking status: Former     Types: Cigarettes    Smokeless tobacco: Never   Substance Use Topics    Alcohol use: Not Currently       ALLERGIES    No Known Allergies    MEDICATIONS    No current facility-administered medications on file prior to encounter.     Current Outpatient Medications on File Prior to Encounter   Medication Sig Dispense Refill    traMADol (ULTRAM) 50 MG tablet Take 1 tablet by mouth every 6 hours as needed for Pain. Max Daily Amount: 200 mg      sodium chloride, PF, 0.9 % injection Infuse 10 mLs intravenously 2 times daily 1 vial 5    folic acid (FOLVITE) 1 MG tablet Take 1 tablet by mouth daily 30 tablet 3    Multiple Vitamins-Minerals (THERAPEUTIC MULTIVITAMIN-MINERALS) tablet Take 1 tablet by mouth daily 30 tablet 0    thiamine 100 MG tablet Take 1 tablet by mouth daily 30 tablet 3    
 [facility]  Consult Note       John Paul Burgos  AGE: 78 y.o.   GENDER: male  : 1945  TODAY'S DATE:  3/28/2024    Subjective:     Reason for  Evaluation and Assessment: wound care reassessment.       John Paul Burgos is a 78 y.o. male referred by:   [x] Physician  [] Nursing  [] Other:     Wound Identification:  Wound Type: traumatic  Contributing Factors: chronic pressure, decreased mobility, and malnutrition        PAST MEDICAL HISTORY        Diagnosis Date    Alcohol abuse     Fall in elderly patient     Smoking history     Tobacco abuse        PAST SURGICAL HISTORY    Past Surgical History:   Procedure Laterality Date    FOOT DEBRIDEMENT Left 2021    FOOT DEBRIDEMENT INCISION AND DRAINAGE performed by Lionel Leggett II, MD at [facility] OR    WRIST FRACTURE SURGERY Left 2021    LEFT WRIST IRRIGATION AND DEBRIDEMENT, EXAM UNDER FLUOROSCOPY performed by Miguel A Palma MD at [facility] OR       FAMILY HISTORY    History reviewed. No pertinent family history.    SOCIAL HISTORY    Social History     Tobacco Use    Smoking status: Former     Types: Cigarettes    Smokeless tobacco: Never   Substance Use Topics    Alcohol use: Not Currently       ALLERGIES    No Known Allergies    MEDICATIONS    No current facility-administered medications on file prior to encounter.     Current Outpatient Medications on File Prior to Encounter   Medication Sig Dispense Refill    traMADol (ULTRAM) 50 MG tablet Take 1 tablet by mouth every 6 hours as needed for Pain. Max Daily Amount: 200 mg      sodium chloride, PF, 0.9 % injection Infuse 10 mLs intravenously 2 times daily 1 vial 5    folic acid (FOLVITE) 1 MG tablet Take 1 tablet by mouth daily 30 tablet 3    Multiple Vitamins-Minerals (THERAPEUTIC MULTIVITAMIN-MINERALS) tablet Take 1 tablet by mouth daily 30 tablet 0    thiamine 100 MG tablet Take 1 tablet by mouth daily 30 tablet 3    sodium chloride, PF, 0.9 % injection Infuse 10 mLs intravenously 2 
CONSULT DICTATED #[id number]  
Comprehensive Nutrition Assessment    Type and Reason for Visit:  Initial, Wound, Consult, Positive Nutrition Screen (Pee Score, MST 2: weight loss, poor appetite)    Nutrition Recommendations/Plan:   Trial variety of high protein oral nutrition supplements during admission (Standard, Alphonse BID, Frozen)   Liberalize to Regular Diet   Please replete/correct electrolytes as medically able (Mg, K, Phos) as pt is at risk for refeeding syndrome   Encourage proper hydration/fluids intake  Encourage small, more frequent meal/intakes  Continue vitamin/mineral supplementation  Please document all PO intakes in I/O      Malnutrition Assessment:  Malnutrition Status:  At risk for malnutrition (Comment) (03/22/24 1420)    Context:  Acute Illness   (Will trial for NFPE upon reassess)   Findings of the 6 clinical characteristics of malnutrition:  Energy Intake:   (Predicted due to low lyte levels)  Weight Loss:  Unable to assess     Body Fat Loss:  Unable to assess     Muscle Mass Loss:  Unable to assess    Fluid Accumulation:  No significant fluid accumulation Extremities   Strength:  Not Performed    Nutrition Assessment:    Admitted with hypokalemia, Hx rhabdomyolysis, sepsis, arthritis, alcohuse abuse. Currently on carb controlled/cardiac diet order, will liberalized, no hx DM. Consumed % of last meal intake documented. Attempted to visit pt 2x, busy with care multiple times. Having BM at visit. Pt screened for poor appetite and weight loss, at risk for refeeding syndrome, will monitor as high nutrition risk.    Nutrition Related Findings:    K 2.4, Phos 1.6; Rx: folvite, thiamine, MV Wound Type: Venous Stasis, Pressure Injury, Multiple (Multiple Traumatic Wounds)       Current Nutrition Intake & Therapies:    Average Meal Intake: %  Average Supplements Intake: Unable to assess  ADULT DIET; Regular; 4 carb choices (60 gm/meal); Low Fat/Low Chol/High Fiber/SEVERO  ADULT ORAL NUTRITION SUPPLEMENT; Breakfast; 
Nephrology Service Consultation      [address], [address]  [place] [address]  Phone: [phone]  Office Hours: 8:30AM - 4:30PM  Monday - Friday        MEDICAL DECISION MAKING and Recommendations   -Severe hypokalemia of 2 on admission//  -Metabolic alkalosis  -Diarrhea at the nursing home per pt  -Normotension  -Hypophosphatemia     Suggest:  -Renin and shira pending  -Obtain spot urine chloride  -Give few doses of aldactone  -Replete K per sliding scale order based on the BMP q8hrs  -Replete phos orally    Thank you        Patient Active Problem List    Diagnosis Date Noted    Hypokalemia 03/21/2024    Receiving intravenous antibiotic treatment as outpatient 03/04/2021    Bacterial infection due to Streptococcus, group G 03/04/2021    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    HISTORY OF PRESENT ILLNESS:   The patient is a 78 y.o. male with hx alcohol abuse, presented from his nursing home as he was found to have a potassium level of 1.9.  Renal consult for potassium 2.9 today  Mr Burgos reports that he was having lots of diarrhea for 4-5days.  He reports poor oral intake as each time he ate, he would have diarrhea.  He denies vomiting.  He does not take diuretics per his med list.  He has a hx of alcohol abuse in the past.  The potassium level is being repleted IV and PO but he reports that the potassium tabs are \"horse pills\"  He reports that he never had hypokalemia problems in the past  He has not been on any chemotherapeutic drugs    REVIEW OF SYSTEMS:  14 point ROS is Negative. See positive ROS per HPI    Past Medical History:      
[facility] ACUTE CARE PHYSICAL THERAPY EVALUATION  John Paul Burgos, 1945, 2011/2011-A, 3/22/2024    History  [place]:  The encounter diagnosis was Hypokalemia.  Patient  has a past medical history of Alcohol abuse, Fall in elderly patient, Smoking history, and Tobacco abuse.  Patient  has a past surgical history that includes Foot Debridement (Left, 2/5/2021) and Wrist fracture surgery (Left, 2/9/2021).    Discharge Recommendation: SNF    Subjective:    Patient states:  \"I'm connected to too much!\"      Pain:  did not state numerical value.      Communication with other providers:  Handoff to RNRenu, co-eval with PRIETO Rdz for safety and tolerance    Restrictions: General Precautions, Fall Risk    Home Setup/Prior level of function  Social/Functional History  Type of Home: Facility  Home Layout: One level  Has the patient had two or more falls in the past year or any fall with injury in the past year?:  (reports having one fall)  ADL Assistance: Needs assistance  Homemaking Assistance: Needs assistance  Ambulation Assistance: Needs assistance  Transfer Assistance: Needs assistance    Examination of body systems (includes body structures/functions, activity/participation limitations):  Observation:  supine in bed upon arrival and agreeable to therapy  Patient initially agreeable for therapy evaluation but then denied willingness for further mobility  Patient presenting with contracted L hand  Vision:  WFL  Hearing:  WFL  Cardiopulmonary:  on room air  Cognition: impaired, see OT/SLP note for further evaluation. Patient presenting with reduced insight and poor safety awareness.     Musculoskeletal  ROM R/L:  WFL.    Strength R/L:  gross 4+/5 in BLE, minimal impairment in function and endurance.        Mobility:  Rolling L/R:  SBA  Supine to sit:  CGA/Christal for transition to long sitting in bed  Transfers: DNT due to patient unwillingness  Sitting balance:  DNT.    Standing balance:  DNT.    Gait: 
  Undermining Maxium Distance (cm) 0 03/29/24 1609   Wound Assessment Pink/red 04/03/24 0959   Drainage Amount None (dry) 04/03/24 0959   Drainage Description Serous 03/29/24 1609   Odor None 04/03/24 0959   Leyla-wound Assessment Dry/flaky 04/03/24 0959   Margins Defined edges 04/03/24 0959   Wound Thickness Description not for Pressure Injury Partial thickness 04/03/24 0959   Number of days: 12       Response to treatment:  Well tolerated by patient.     Pain Assessment:  Severity:  none  Quality of pain: na  Wound Pain Timing/Severity: na  Premedicated: no    Plan:     Plan of Care: [REMOVED] Wound 03/21/24 Pretibial Left;Anterior-Dressing/Treatment: Foam impregnated with Ag, Roll gauze  Wound 03/21/24 Toe (Comment  which one) Left;Anterior cluster-Dressing/Treatment: Open to air  [REMOVED] Wound 03/21/24 Sacrum-Dressing/Treatment: Moisture barrier  Wound 03/22/24 Ankle Left;Medial-Dressing/Treatment: Silicone border        Pt in bed.  Agreeable to wound reassessment.  Hemosiderin staining noted to bilateral lower legs with dry scaly skin especially to left leg. Washed with bath oil and pat dry as much as pt could tolerate. Small open areas noted to  left medial ankle. Traumatic/venous. Mostly dry. Covered with border.  Some dry eschar noted to toes. Heels intact. Sacrum intact with some scar tissue noted. Pt is a moderate risk for skin breakdown AEB Pee. Follow Pee orders.     Specialty Bed Required : yes  [x] Low Air Loss   [x] Pressure Redistribution  [] Fluid Immersion  [] Bariatric  [] Total Pressure Relief  [] Other:     Discharge Plan:  Placement for patient upon discharge: tbd  Hospice Care: no  Patient appropriate for Outpatient Wound Care Center: no    Patient/Caregiver Teaching:  Level of patient/caregiver understanding able to:   No evidence.        Electronically signed by Alyssa Golden RN, CWOCN on 4/3/2024 at 10:05 AM

## 2024-04-03 NOTE — CARE COORDINATION
WHEN MEDICALLY READY FOR DISCHARGE-  COMPLETE JANA   RN/PHYSICIAN  COPY JANA - FAX -[phone]  ADD JANA TO PACKET  CALL REPORT -[phone]  CALL FAMILY   CALL TRANSPORT-  [place]  [phone]    Leann Guardado RN     [phone] Received call from staff RN Glenn. Discharge noted. Plan for 2 PM discharge. Leann Guardado RN     121 Contacted [place]; spoke to [facility]. 2 PM . [facility] 2 PM  time. Leann Guardado RN     [id number] Packet given to Glenn [place]; updated on needs - complete JANA and call family. Leann Guardado RN

## 2024-04-05 ENCOUNTER — HOSPITAL ENCOUNTER (OUTPATIENT)
Age: 79
Setting detail: SPECIMEN
Discharge: HOME | End: 2024-04-05
Payer: COMMERCIAL

## 2024-04-05 LAB
ANION GAP SERPL CALCULATED.3IONS-SCNC: 15 MMOL/L (ref 7–16)
BUN SERPL-MCNC: 14 MG/DL (ref 6–23)
CALCIUM SERPL-MCNC: 9 MG/DL (ref 8.3–10.6)
CHLORIDE BLD-SCNC: 93 MMOL/L (ref 99–110)
CO2: 27 MMOL/L (ref 21–32)
CREAT SERPL-MCNC: 0.7 MG/DL (ref 0.9–1.3)
GFR SERPL CREATININE-BSD FRML MDRD: >90 ML/MIN/1.73M2
GLUCOSE SERPL-MCNC: 89 MG/DL (ref 70–99)
MAGNESIUM: 2.2 MG/DL (ref 1.8–2.4)
POTASSIUM SERPL-SCNC: 3.4 MMOL/L (ref 3.5–5.1)
SODIUM BLD-SCNC: 135 MMOL/L (ref 135–145)

## 2024-04-05 PROCEDURE — 80048 BASIC METABOLIC PNL TOTAL CA: CPT

## 2024-04-05 PROCEDURE — 36415 COLL VENOUS BLD VENIPUNCTURE: CPT

## 2024-04-05 PROCEDURE — 83735 ASSAY OF MAGNESIUM: CPT

## 2024-04-12 ENCOUNTER — HOSPITAL ENCOUNTER (OUTPATIENT)
Age: 79
Setting detail: SPECIMEN
Discharge: HOME | End: 2024-04-12
Payer: COMMERCIAL

## 2024-04-12 LAB
ANION GAP SERPL CALCULATED.3IONS-SCNC: 14 MMOL/L (ref 7–16)
BUN SERPL-MCNC: 12 MG/DL (ref 6–23)
CALCIUM SERPL-MCNC: 8.6 MG/DL (ref 8.3–10.6)
CHLORIDE BLD-SCNC: 94 MMOL/L (ref 99–110)
CO2: 28 MMOL/L (ref 21–32)
CREAT SERPL-MCNC: 0.6 MG/DL (ref 0.9–1.3)
GFR SERPL CREATININE-BSD FRML MDRD: >90 ML/MIN/1.73M2
GLUCOSE SERPL-MCNC: 59 MG/DL (ref 70–99)
MAGNESIUM: 2.2 MG/DL (ref 1.8–2.4)
POTASSIUM SERPL-SCNC: 3.6 MMOL/L (ref 3.5–5.1)
SODIUM BLD-SCNC: 136 MMOL/L (ref 135–145)

## 2024-04-12 PROCEDURE — 83735 ASSAY OF MAGNESIUM: CPT

## 2024-04-12 PROCEDURE — 80048 BASIC METABOLIC PNL TOTAL CA: CPT

## 2024-04-12 PROCEDURE — 36415 COLL VENOUS BLD VENIPUNCTURE: CPT

## 2024-04-18 ENCOUNTER — HOSPITAL ENCOUNTER (OUTPATIENT)
Age: 79
Setting detail: SPECIMEN
Discharge: HOME | End: 2024-04-18
Payer: COMMERCIAL

## 2024-04-18 LAB
ANION GAP SERPL CALCULATED.3IONS-SCNC: 12 MMOL/L (ref 7–16)
BUN SERPL-MCNC: 12 MG/DL (ref 6–23)
CALCIUM SERPL-MCNC: 8.6 MG/DL (ref 8.3–10.6)
CHLORIDE BLD-SCNC: 94 MMOL/L (ref 99–110)
CO2: 30 MMOL/L (ref 21–32)
CREAT SERPL-MCNC: 0.7 MG/DL (ref 0.9–1.3)
GFR SERPL CREATININE-BSD FRML MDRD: >90 ML/MIN/1.73M2
GLUCOSE SERPL-MCNC: 150 MG/DL (ref 70–99)
POTASSIUM SERPL-SCNC: 3 MMOL/L (ref 3.5–5.1)
SODIUM BLD-SCNC: 136 MMOL/L (ref 135–145)

## 2024-04-18 PROCEDURE — 36415 COLL VENOUS BLD VENIPUNCTURE: CPT

## 2024-04-18 PROCEDURE — 80048 BASIC METABOLIC PNL TOTAL CA: CPT

## 2024-04-19 ENCOUNTER — HOSPITAL ENCOUNTER (OUTPATIENT)
Age: 79
Setting detail: SPECIMEN
Discharge: HOME | End: 2024-04-19
Payer: COMMERCIAL

## 2024-04-19 LAB
ANION GAP SERPL CALCULATED.3IONS-SCNC: 11 MMOL/L (ref 7–16)
BUN SERPL-MCNC: 13 MG/DL (ref 6–23)
CALCIUM SERPL-MCNC: 9 MG/DL (ref 8.3–10.6)
CHLORIDE BLD-SCNC: 95 MMOL/L (ref 99–110)
CO2: 30 MMOL/L (ref 21–32)
CREAT SERPL-MCNC: 0.7 MG/DL (ref 0.9–1.3)
GFR SERPL CREATININE-BSD FRML MDRD: >90 ML/MIN/1.73M2
GLUCOSE SERPL-MCNC: 119 MG/DL (ref 70–99)
MAGNESIUM: 1.9 MG/DL (ref 1.8–2.4)
POTASSIUM SERPL-SCNC: 3.9 MMOL/L (ref 3.5–5.1)
SODIUM BLD-SCNC: 136 MMOL/L (ref 135–145)

## 2024-04-19 PROCEDURE — 36415 COLL VENOUS BLD VENIPUNCTURE: CPT

## 2024-04-19 PROCEDURE — 80048 BASIC METABOLIC PNL TOTAL CA: CPT

## 2024-04-19 PROCEDURE — 83735 ASSAY OF MAGNESIUM: CPT

## 2024-04-22 ENCOUNTER — HOSPITAL ENCOUNTER (OUTPATIENT)
Age: 79
Setting detail: SPECIMEN
Discharge: HOME | End: 2024-04-22
Payer: COMMERCIAL

## 2024-04-22 LAB
ALBUMIN SERPL-MCNC: 3.7 GM/DL (ref 3.4–5)
ALP BLD-CCNC: 104 IU/L (ref 40–128)
ALT SERPL-CCNC: 8 U/L (ref 10–40)
ANION GAP SERPL CALCULATED.3IONS-SCNC: 16 MMOL/L (ref 7–16)
AST SERPL-CCNC: 14 IU/L (ref 15–37)
BILIRUB SERPL-MCNC: 0.5 MG/DL (ref 0–1)
BUN SERPL-MCNC: 15 MG/DL (ref 6–23)
CALCIUM SERPL-MCNC: 8.7 MG/DL (ref 8.3–10.6)
CHLORIDE BLD-SCNC: 95 MMOL/L (ref 99–110)
CO2: 27 MMOL/L (ref 21–32)
CREAT SERPL-MCNC: 0.7 MG/DL (ref 0.9–1.3)
GFR SERPL CREATININE-BSD FRML MDRD: >90 ML/MIN/1.73M2
GLUCOSE SERPL-MCNC: 123 MG/DL (ref 70–99)
HCT VFR BLD CALC: 36.9 % (ref 42–52)
HEMOGLOBIN: 11.6 GM/DL (ref 13.5–18)
MCH RBC QN AUTO: 31.4 PG (ref 27–31)
MCHC RBC AUTO-ENTMCNC: 31.4 % (ref 32–36)
MCV RBC AUTO: 99.7 FL (ref 78–100)
PDW BLD-RTO: 13.4 % (ref 11.7–14.9)
PLATELET # BLD: 356 K/CU MM (ref 140–440)
PMV BLD AUTO: 10.5 FL (ref 7.5–11.1)
POTASSIUM SERPL-SCNC: 3.6 MMOL/L (ref 3.5–5.1)
PROCALCITONIN SERPL-MCNC: 0.06 NG/ML
RBC # BLD: 3.7 M/CU MM (ref 4.6–6.2)
SODIUM BLD-SCNC: 138 MMOL/L (ref 135–145)
TOTAL PROTEIN: 6.8 GM/DL (ref 6.4–8.2)
WBC # BLD: 10.1 K/CU MM (ref 4–10.5)

## 2024-04-22 PROCEDURE — 84145 PROCALCITONIN (PCT): CPT

## 2024-04-22 PROCEDURE — 36415 COLL VENOUS BLD VENIPUNCTURE: CPT

## 2024-04-22 PROCEDURE — 80053 COMPREHEN METABOLIC PANEL: CPT

## 2024-04-22 PROCEDURE — 85027 COMPLETE CBC AUTOMATED: CPT

## 2024-04-23 ENCOUNTER — HOSPITAL ENCOUNTER (OUTPATIENT)
Age: 79
Setting detail: SPECIMEN
Discharge: HOME | End: 2024-04-23

## 2024-04-24 ENCOUNTER — HOSPITAL ENCOUNTER (OUTPATIENT)
Age: 79
Setting detail: SPECIMEN
Discharge: HOME | End: 2024-04-24
Payer: COMMERCIAL

## 2024-04-24 LAB
ALBUMIN SERPL-MCNC: 3.5 GM/DL (ref 3.4–5)
ALP BLD-CCNC: 99 IU/L (ref 40–128)
ALT SERPL-CCNC: 7 U/L (ref 10–40)
ANION GAP SERPL CALCULATED.3IONS-SCNC: 12 MMOL/L (ref 7–16)
AST SERPL-CCNC: 10 IU/L (ref 15–37)
BILIRUB SERPL-MCNC: 0.5 MG/DL (ref 0–1)
BILIRUBIN URINE: NEGATIVE MG/DL
BLOOD, URINE: NEGATIVE
BUN SERPL-MCNC: 16 MG/DL (ref 6–23)
CALCIUM SERPL-MCNC: 8.6 MG/DL (ref 8.3–10.6)
CHLORIDE BLD-SCNC: 96 MMOL/L (ref 99–110)
CLARITY: CLEAR
CO2: 29 MMOL/L (ref 21–32)
COLOR: YELLOW
COMMENT UA: NORMAL
CREAT SERPL-MCNC: 0.6 MG/DL (ref 0.9–1.3)
GFR SERPL CREATININE-BSD FRML MDRD: >90 ML/MIN/1.73M2
GLUCOSE SERPL-MCNC: 64 MG/DL (ref 70–99)
GLUCOSE, URINE: NEGATIVE MG/DL
KETONES, URINE: NEGATIVE MG/DL
LEUKOCYTE ESTERASE, URINE: NEGATIVE
NITRITE URINE, QUANTITATIVE: NEGATIVE
PH, URINE: 6 (ref 5–8)
POTASSIUM SERPL-SCNC: 3.4 MMOL/L (ref 3.5–5.1)
PROTEIN UA: NEGATIVE MG/DL
SODIUM BLD-SCNC: 137 MMOL/L (ref 135–145)
SPECIFIC GRAVITY UA: <1.005 (ref 1–1.03)
TOTAL PROTEIN: 6.4 GM/DL (ref 6.4–8.2)
UROBILINOGEN, URINE: 0.2 MG/DL (ref 0.2–1)

## 2024-04-24 PROCEDURE — 36415 COLL VENOUS BLD VENIPUNCTURE: CPT

## 2024-04-24 PROCEDURE — 80053 COMPREHEN METABOLIC PANEL: CPT

## 2024-04-26 ENCOUNTER — HOSPITAL ENCOUNTER (OUTPATIENT)
Age: 79
Setting detail: SPECIMEN
Discharge: HOME | End: 2024-04-26
Payer: COMMERCIAL

## 2024-04-26 LAB
ANION GAP SERPL CALCULATED.3IONS-SCNC: 11 MMOL/L (ref 7–16)
BUN SERPL-MCNC: 14 MG/DL (ref 6–23)
CALCIUM SERPL-MCNC: 8.1 MG/DL (ref 8.3–10.6)
CHLORIDE BLD-SCNC: 94 MMOL/L (ref 99–110)
CO2: 29 MMOL/L (ref 21–32)
CREAT SERPL-MCNC: 0.6 MG/DL (ref 0.9–1.3)
GFR SERPL CREATININE-BSD FRML MDRD: >90 ML/MIN/1.73M2
GLUCOSE SERPL-MCNC: 69 MG/DL (ref 70–99)
MAGNESIUM: 2 MG/DL (ref 1.8–2.4)
POTASSIUM SERPL-SCNC: 2.6 MMOL/L (ref 3.5–5.1)
SODIUM BLD-SCNC: 134 MMOL/L (ref 135–145)

## 2024-04-26 PROCEDURE — 36415 COLL VENOUS BLD VENIPUNCTURE: CPT

## 2024-04-26 PROCEDURE — 80048 BASIC METABOLIC PNL TOTAL CA: CPT

## 2024-04-26 PROCEDURE — 83735 ASSAY OF MAGNESIUM: CPT

## 2024-04-27 ENCOUNTER — HOSPITAL ENCOUNTER (OUTPATIENT)
Age: 79
Setting detail: SPECIMEN
Discharge: HOME | End: 2024-04-27

## 2024-04-27 LAB
ANION GAP SERPL CALCULATED.3IONS-SCNC: 13 MMOL/L (ref 7–16)
BASOPHILS ABSOLUTE: 0.1 K/CU MM
BASOPHILS RELATIVE PERCENT: 0.7 % (ref 0–1)
BUN SERPL-MCNC: 16 MG/DL (ref 6–23)
CALCIUM SERPL-MCNC: 8.5 MG/DL (ref 8.3–10.6)
CHLORIDE BLD-SCNC: 95 MMOL/L (ref 99–110)
CO2: 26 MMOL/L (ref 21–32)
CREAT SERPL-MCNC: 0.6 MG/DL (ref 0.9–1.3)
DIFFERENTIAL TYPE: ABNORMAL
EOSINOPHILS ABSOLUTE: 0 K/CU MM
EOSINOPHILS RELATIVE PERCENT: 0.3 % (ref 0–3)
GFR SERPL CREATININE-BSD FRML MDRD: >90 ML/MIN/1.73M2
GLUCOSE SERPL-MCNC: 76 MG/DL (ref 70–99)
HCT VFR BLD CALC: 35.6 % (ref 42–52)
HEMOGLOBIN: 10.8 GM/DL (ref 13.5–18)
IMMATURE NEUTROPHIL %: 0.8 % (ref 0–0.43)
LYMPHOCYTES ABSOLUTE: 0.9 K/CU MM
LYMPHOCYTES RELATIVE PERCENT: 7.4 % (ref 24–44)
MCH RBC QN AUTO: 31.7 PG (ref 27–31)
MCHC RBC AUTO-ENTMCNC: 30.3 % (ref 32–36)
MCV RBC AUTO: 104.4 FL (ref 78–100)
MONOCYTES ABSOLUTE: 1.2 K/CU MM
MONOCYTES RELATIVE PERCENT: 10.7 % (ref 0–4)
NEUTROPHILS RELATIVE PERCENT: 80.1 % (ref 36–66)
NUCLEATED RBC %: 0 %
PDW BLD-RTO: 13.5 % (ref 11.7–14.9)
PLATELET # BLD: 326 K/CU MM (ref 140–440)
PMV BLD AUTO: 10.2 FL (ref 7.5–11.1)
POTASSIUM SERPL-SCNC: 3.5 MMOL/L (ref 3.5–5.1)
PROCALCITONIN SERPL-MCNC: 0.2 NG/ML
RBC # BLD: 3.41 M/CU MM (ref 4.6–6.2)
SEGMENTED NEUTROPHILS ABSOLUTE COUNT: 9.2 K/CU MM
SODIUM BLD-SCNC: 134 MMOL/L (ref 135–145)
TOTAL IMMATURE NEUTOROPHIL: 0.09 K/CU MM
TOTAL NUCLEATED RBC: 0 K/CU MM
WBC # BLD: 11.5 K/CU MM (ref 4–10.5)

## 2024-04-27 PROCEDURE — 84145 PROCALCITONIN (PCT): CPT

## 2024-04-27 PROCEDURE — 80048 BASIC METABOLIC PNL TOTAL CA: CPT

## 2024-04-27 PROCEDURE — 85025 COMPLETE CBC W/AUTO DIFF WBC: CPT

## 2024-04-27 PROCEDURE — 9900360100 HC STAT COLLECTION FEE SNF

## 2024-04-27 PROCEDURE — 36415 COLL VENOUS BLD VENIPUNCTURE: CPT

## 2024-04-28 LAB
C DIFF AG + TOXIN: ABNORMAL
CLOSTRIDIUM DIFFICILE, PCR: ABNORMAL
SOURCE: ABNORMAL

## 2024-04-30 ENCOUNTER — HOSPITAL ENCOUNTER (OUTPATIENT)
Age: 79
Setting detail: SPECIMEN
Discharge: HOME | End: 2024-04-30
Payer: COMMERCIAL

## 2024-04-30 LAB
ANION GAP SERPL CALCULATED.3IONS-SCNC: 15 MMOL/L (ref 7–16)
BUN SERPL-MCNC: 31 MG/DL (ref 6–23)
CALCIUM SERPL-MCNC: 8.5 MG/DL (ref 8.3–10.6)
CHLORIDE BLD-SCNC: 92 MMOL/L (ref 99–110)
CO2: 25 MMOL/L (ref 21–32)
CREAT SERPL-MCNC: 1.4 MG/DL (ref 0.9–1.3)
GFR SERPL CREATININE-BSD FRML MDRD: 51 ML/MIN/1.73M2
GLUCOSE SERPL-MCNC: 74 MG/DL (ref 70–99)
POTASSIUM SERPL-SCNC: 3.6 MMOL/L (ref 3.5–5.1)
SODIUM BLD-SCNC: 132 MMOL/L (ref 135–145)

## 2024-04-30 PROCEDURE — 36415 COLL VENOUS BLD VENIPUNCTURE: CPT

## 2024-04-30 PROCEDURE — 80048 BASIC METABOLIC PNL TOTAL CA: CPT

## 2024-08-01 NOTE — PROGRESS NOTES
76 yo M with ongoing Left wrist/forearm pain after found down for likely 2d  Fair historian and hx of etoh use  Not clear on how he may have hurt wrist, found down unknown if laying on forearm  Pt has known arthritic condition [facility] wrist (triquetral fx read on xray not confirmed on CT)  Pt c/o wrist pain and wrist was aspirated -quantity not enough to do cell count - drop of bloody fluid sent for cx 2/4/21  Unlikely drainable abscess in wrist with no substantial fluid from aspiration. ,  Group G strep with pan sensitivity  Pt also with + known infection and open wound on foot    Initial tx with aspiration and abx - wrist still painful  WBC is going down and pt has been afebrile  Some forearm swelling, no pain or crepitus  Pt improving from foot debridement    IMP/PLAN  1. Painful wrist, lt growth from aspirate, has been tx with abx, and is pan sensitive  2. 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 Improvement in infectious markers wbc, afebrile  4. MRI of wrist and Forearm ordered to eval for any potential drainable abscess  5. Assoc foot abscess tx with G surg.   6. Npo for now Yes

## (undated) DEVICE — GLOVE ORANGE PI 7   MSG9070

## (undated) DEVICE — TUBING SUCT 12FR MAL ALUM SHFT FN CAP VENT UNIV CONN W/ OBT

## (undated) DEVICE — DRAPE,U/ SHT,SPLIT,PLAS,STERIL: Brand: MEDLINE

## (undated) DEVICE — GAUZE,SPONGE,4"X4",16PLY,XRAY,STRL,LF: Brand: MEDLINE

## (undated) DEVICE — BANDAGE COMPR W3INXL5YD WHT BGE POLY COT M E WRP WV HK AND

## (undated) DEVICE — SPONGE GZ W4XL8IN COT WVN 12 PLY

## (undated) DEVICE — CONTAINER,SPECIMEN,OR STERILE,4OZ: Brand: MEDLINE

## (undated) DEVICE — DRAPE,HAND,STERILE: Brand: MEDLINE

## (undated) DEVICE — SUTURE ETHLN SZ 3-0 L18IN NONABSORBABLE BLK FS-1 L24MM 3/8 663H

## (undated) DEVICE — SPONGE LAP W18XL18IN WHT COT 4 PLY FLD STRUNG RADPQ DISP ST

## (undated) DEVICE — TOWEL,OR,DSP,ST,BLUE,STD,6/PK,12PK/CS: Brand: MEDLINE

## (undated) DEVICE — APPLICATOR MEDICATED 26 CC SOLUTION HI LT ORNG CHLORAPREP

## (undated) DEVICE — FAN SPRAY KIT: Brand: PULSAVAC®

## (undated) DEVICE — TUBING, SUCTION, 9/32" X 10', STRAIGHT: Brand: MEDLINE

## (undated) DEVICE — GLOVE ORTHO 8   MSG9480

## (undated) DEVICE — BANDAGE,GAUZE,BULKEE II,4.5"X4.1YD,STRL: Brand: MEDLINE

## (undated) DEVICE — PENCIL ES CRD L10FT HND SWCHING ROCK SWCH W/ EDGE COAT BLDE

## (undated) DEVICE — DRAPE,EXTREMITY,89X128,STERILE: Brand: MEDLINE

## (undated) DEVICE — SOLUTION SCRB 4OZ 4% CHG CLN BASE FOR PT SKIN ANTISEPSIS

## (undated) DEVICE — COUNTER NDL 30 COUNT FOAM STRP SGL MAG

## (undated) DEVICE — YANKAUER,FLEXIBLE HANDLE,REGLR CAPACITY: Brand: MEDLINE INDUSTRIES, INC.

## (undated) DEVICE — BANDAGE,ELASTIC,ESMARK,STERILE,4"X9',LF: Brand: MEDLINE

## (undated) DEVICE — PADDING,UNDERCAST,COTTON, 4"X4YD STERILE: Brand: MEDLINE

## (undated) DEVICE — COTTON UNDERCAST PADDING,REGULAR FINISH: Brand: WEBRIL

## (undated) DEVICE — ZIMMER® STERILE DISPOSABLE TOURNIQUET CUFF WITH PLC, DUAL PORT, SINGLE BLADDER, 18 IN. (46 CM)

## (undated) DEVICE — DRESSING PETRO W3XL3IN OIL EMUL N ADH GZ KNIT IMPREG CELOS

## (undated) DEVICE — INTENDED FOR TISSUE SEPARATION, AND OTHER PROCEDURES THAT REQUIRE A SHARP SURGICAL BLADE TO PUNCTURE OR CUT.: Brand: BARD-PARKER ® STAINLESS STEEL BLADES

## (undated) DEVICE — ELECTRODE ES AD CRDLSS PT RET REM POLYHESIVE

## (undated) DEVICE — SYRINGE IRRIG 60ML SFT PLIABLE BLB EZ TO GRP 1 HND USE W/

## (undated) DEVICE — BANDAGE COMPR W4INXL5YD WHT BGE POLY COT M E WRP WV HK AND

## (undated) DEVICE — 1010 S-DRAPE TOWEL DRAPE 10/BX: Brand: STERI-DRAPE™

## (undated) DEVICE — TRAY PREP DRY W/ PREM GLV 2 APPL 6 SPNG 2 UNDPD 1 OVERWRAP

## (undated) DEVICE — TUBING, SUCTION, 3/16" X 10', STRAIGHT: Brand: MEDLINE

## (undated) DEVICE — STRIP WND PK W0.25INXL5YD IODO GZ TIGHTLY WVN CURAD

## (undated) DEVICE — PACK,BASIC,IX: Brand: MEDLINE

## (undated) DEVICE — PAD,ABDOMINAL,5"X9",ST,LF,25/BX: Brand: MEDLINE INDUSTRIES, INC.

## (undated) DEVICE — GLOVE SURG SZ 7 CRM LTX FREE POLYISOPRENE POLYMER BEAD ANTI

## (undated) DEVICE — MARKER SURG SKIN UTIL REGULAR/FINE 2 TIP W/ RUL AND 9 LBL

## (undated) DEVICE — GARMENT,MEDLINE,DVT,INT,CALF,MED, GEN2: Brand: MEDLINE